# Patient Record
Sex: MALE | Race: BLACK OR AFRICAN AMERICAN | Employment: OTHER | URBAN - METROPOLITAN AREA
[De-identification: names, ages, dates, MRNs, and addresses within clinical notes are randomized per-mention and may not be internally consistent; named-entity substitution may affect disease eponyms.]

---

## 2019-12-16 ENCOUNTER — OFFICE VISIT (OUTPATIENT)
Dept: FAMILY MEDICINE CLINIC | Age: 64
End: 2019-12-16

## 2019-12-16 ENCOUNTER — HOSPITAL ENCOUNTER (OUTPATIENT)
Dept: LAB | Age: 64
Discharge: HOME | End: 2019-12-16

## 2019-12-16 VITALS
TEMPERATURE: 98.7 F | RESPIRATION RATE: 18 BRPM | OXYGEN SATURATION: 99 % | DIASTOLIC BLOOD PRESSURE: 94 MMHG | HEART RATE: 80 BPM | BODY MASS INDEX: 39.45 KG/M2 | HEIGHT: 72 IN | SYSTOLIC BLOOD PRESSURE: 145 MMHG | WEIGHT: 291.3 LBS

## 2019-12-16 DIAGNOSIS — E66.01 SEVERE OBESITY (HCC): ICD-10-CM

## 2019-12-16 DIAGNOSIS — I73.9 PERIPHERAL VASCULAR DISEASE (HCC): ICD-10-CM

## 2019-12-16 DIAGNOSIS — J43.9 PULMONARY EMPHYSEMA, UNSPECIFIED EMPHYSEMA TYPE (HCC): ICD-10-CM

## 2019-12-16 DIAGNOSIS — G47.33 OSA (OBSTRUCTIVE SLEEP APNEA): Chronic | ICD-10-CM

## 2019-12-16 DIAGNOSIS — M50.30 DDD (DEGENERATIVE DISC DISEASE), CERVICAL: ICD-10-CM

## 2019-12-16 DIAGNOSIS — I10 ESSENTIAL HYPERTENSION: Primary | ICD-10-CM

## 2019-12-16 DIAGNOSIS — I10 ESSENTIAL HYPERTENSION: ICD-10-CM

## 2019-12-16 LAB
ALBUMIN SERPL-MCNC: 4.4 G/DL (ref 3.5–5)
ALBUMIN/GLOB SERPL: 1.3 {RATIO} (ref 1.1–2.2)
ALP SERPL-CCNC: 92 U/L (ref 45–117)
ALT SERPL-CCNC: 55 U/L (ref 12–78)
ANION GAP SERPL CALC-SCNC: 6 MMOL/L (ref 5–15)
AST SERPL-CCNC: 32 U/L (ref 15–37)
BASOPHILS # BLD: 0.1 K/UL (ref 0–0.1)
BASOPHILS NFR BLD: 1 % (ref 0–1)
BILIRUB SERPL-MCNC: 0.4 MG/DL (ref 0.2–1)
BUN SERPL-MCNC: 16 MG/DL (ref 6–20)
BUN/CREAT SERPL: 14 (ref 12–20)
CALCIUM SERPL-MCNC: 9 MG/DL (ref 8.5–10.1)
CHLORIDE SERPL-SCNC: 106 MMOL/L (ref 97–108)
CHOLEST SERPL-MCNC: 207 MG/DL
CO2 SERPL-SCNC: 25 MMOL/L (ref 21–32)
CREAT SERPL-MCNC: 1.14 MG/DL (ref 0.7–1.3)
DIFFERENTIAL METHOD BLD: ABNORMAL
EOSINOPHIL # BLD: 0.1 K/UL (ref 0–0.4)
EOSINOPHIL NFR BLD: 1 % (ref 0–7)
ERYTHROCYTE [DISTWIDTH] IN BLOOD BY AUTOMATED COUNT: 15.6 % (ref 11.5–14.5)
EST. AVERAGE GLUCOSE BLD GHB EST-MCNC: 126 MG/DL
GLOBULIN SER CALC-MCNC: 3.5 G/DL (ref 2–4)
GLUCOSE SERPL-MCNC: 105 MG/DL (ref 65–100)
HBA1C MFR BLD: 6 % (ref 4–5.6)
HCT VFR BLD AUTO: 49.8 % (ref 36.6–50.3)
HDLC SERPL-MCNC: 41 MG/DL
HDLC SERPL: 5 {RATIO} (ref 0–5)
HGB BLD-MCNC: 16.2 G/DL (ref 12.1–17)
IMM GRANULOCYTES # BLD AUTO: 0 K/UL (ref 0–0.04)
IMM GRANULOCYTES NFR BLD AUTO: 0 % (ref 0–0.5)
LDLC SERPL CALC-MCNC: 135.2 MG/DL (ref 0–100)
LIPID PROFILE,FLP: ABNORMAL
LYMPHOCYTES # BLD: 2 K/UL (ref 0.8–3.5)
LYMPHOCYTES NFR BLD: 30 % (ref 12–49)
MCH RBC QN AUTO: 30.4 PG (ref 26–34)
MCHC RBC AUTO-ENTMCNC: 32.5 G/DL (ref 30–36.5)
MCV RBC AUTO: 93.4 FL (ref 80–99)
MONOCYTES # BLD: 0.8 K/UL (ref 0–1)
MONOCYTES NFR BLD: 11 % (ref 5–13)
NEUTS SEG # BLD: 3.9 K/UL (ref 1.8–8)
NEUTS SEG NFR BLD: 57 % (ref 32–75)
NRBC # BLD: 0 K/UL (ref 0–0.01)
NRBC BLD-RTO: 0 PER 100 WBC
PLATELET # BLD AUTO: 231 K/UL (ref 150–400)
PMV BLD AUTO: 9.9 FL (ref 8.9–12.9)
POTASSIUM SERPL-SCNC: 4.2 MMOL/L (ref 3.5–5.1)
PROT SERPL-MCNC: 7.9 G/DL (ref 6.4–8.2)
RBC # BLD AUTO: 5.33 M/UL (ref 4.1–5.7)
SODIUM SERPL-SCNC: 137 MMOL/L (ref 136–145)
TRIGL SERPL-MCNC: 154 MG/DL (ref ?–150)
TSH SERPL DL<=0.05 MIU/L-ACNC: 1.63 UIU/ML (ref 0.36–3.74)
VLDLC SERPL CALC-MCNC: 30.8 MG/DL
WBC # BLD AUTO: 6.8 K/UL (ref 4.1–11.1)

## 2019-12-16 RX ORDER — NAPROXEN 500 MG/1
500 TABLET ORAL 2 TIMES DAILY WITH MEALS
COMMUNITY
Start: 2019-11-08 | End: 2019-12-16 | Stop reason: SDUPTHER

## 2019-12-16 RX ORDER — ASPIRIN 81 MG/1
TABLET ORAL DAILY
COMMUNITY
End: 2020-03-09

## 2019-12-16 RX ORDER — TIOTROPIUM BROMIDE 18 UG/1
1 CAPSULE ORAL; RESPIRATORY (INHALATION) DAILY
COMMUNITY
Start: 2019-12-05 | End: 2020-03-09

## 2019-12-16 RX ORDER — LOSARTAN POTASSIUM 100 MG/1
100 TABLET ORAL DAILY
COMMUNITY
Start: 2019-10-17 | End: 2020-04-13

## 2019-12-16 RX ORDER — NAPROXEN 500 MG/1
500 TABLET ORAL 2 TIMES DAILY WITH MEALS
Qty: 180 TAB | Refills: 1 | Status: SHIPPED | OUTPATIENT
Start: 2019-12-16 | End: 2021-11-04

## 2019-12-16 RX ORDER — GABAPENTIN 800 MG/1
800 TABLET ORAL 3 TIMES DAILY
COMMUNITY
Start: 2019-12-05 | End: 2020-01-13

## 2019-12-16 RX ORDER — FLUTICASONE FUROATE AND VILANTEROL TRIFENATATE 100; 25 UG/1; UG/1
1 POWDER RESPIRATORY (INHALATION) DAILY
COMMUNITY
Start: 2019-12-05 | End: 2020-11-02 | Stop reason: SDUPTHER

## 2019-12-16 RX ORDER — AMLODIPINE BESYLATE 10 MG/1
10 TABLET ORAL DAILY
COMMUNITY
Start: 2019-10-02 | End: 2020-04-29

## 2019-12-16 NOTE — PROGRESS NOTES
Chief Complaint   Patient presents with   Jaime Noon Establish Care     Nerve pinched in neck: Numbness: Pins and needles Left arm    Sore Throat     on right side X 2-3 weeks    Breathing Problem     COPD: Request Pulmonary referral     Sleep Apnea    Labs     Fasting today    Foot Pain     Left foot     1. Have you been to the ER, urgent care clinic since your last visit? Hospitalized since your last visit? No    2. Have you seen or consulted any other health care providers outside of the [address] [place] [address] since your last visit? Include any pap smears or colon screening. No    Has DDD cerv and is on disability    Has copd and is still smoking    Needs labs on rx    Has HTN and bp is ok at home    Chief Complaint   Patient presents with   Jaime Noon Establish Care     Nerve pinched in neck: Numbness: Pins and needles Left arm    Sore Throat     on right side X 2-3 weeks    Breathing Problem     COPD: Request Pulmonary referral     Sleep Apnea    Labs     Fasting today    Foot Pain     Left foot     He is a 59 y.o. male who presents for evalution. Reviewed PmHx, RxHx, FmHx, SocHx, AllgHx and updated and dated in the chart.     Patient Active Problem List    Diagnosis    Severe obesity ([address]    Peripheral vascular disease ([address]    [facility]ma [facility]    DDD (degenerative disc disease), cervical    Essential hypertension    HELGA (obstructive sleep apnea)       Review of Systems - negative except as listed above in the HPI    Objective:     Vitals:    12/16/19 0930   BP: (!) 145/94   Pulse: 80   Resp: 18   Temp: 98.7 °F (37.1 °C)   TempSrc: Oral   SpO2: 99%   Weight: 291 lb 4.8 oz (132.1 kg)   Height: 6' (1.829 m)     Physical Examination: General appearance - alert, well appearing, and in no distress  Eyes - pupils equal and reactive, extraocular eye movements intact  Ears - bilateral TM's and external ear canals normal  Nose - normal and patent, no erythema, discharge or polyps  Mouth - mucous membranes moist, pharynx normal without lesions  Neck - supple, no significant adenopathy  Chest - clear to auscultation, no wheezes, rales or rhonchi, symmetric air entry  Heart - normal rate, regular rhythm, normal S1, S2, no murmurs, rubs, clicks or gallops  Abdomen - soft, nontender, nondistended, no masses or organomegaly  Extremities - peripheral pulses normal, no pedal edema, no clubbing or cyanosis      Assessment/ Plan:   Diagnoses and all orders for this visit:    1. Essential hypertension  -     LIPID PANEL; Future  -     METABOLIC PANEL, COMPREHENSIVE; Future  -     CBC WITH AUTOMATED DIFF; Future  -     TSH 3RD GENERATION; Future  -     HEMOGLOBIN A1C WITH EAG; Future  -inc in pain    2. Severe obesity ([address]  -dwp wt loss and diet changes    3. DDD (degenerative disc disease), cervical  -     naproxen (NAPROSYN) 500 mg tablet; Take 1 Tab by mouth two (2) times daily (with meals). -add rx    4. Peripheral vascular disease ([address]  -     LIPID PANEL; Future  -     METABOLIC PANEL, COMPREHENSIVE; Future    5. Pulmonary emphysema, unspecified emphysema type ([address]  -dwp need to dc smoking  -refer to pulm    6. HELGA (obstructive sleep apnea)  -con cpap           I have discussed the diagnosis with the patient and the intended plan as seen in the above orders. The patient understands and agrees with the plan. The patient has received an after-visit summary and questions were answered concerning future plans. Medication Side Effects and Warnings were discussed with patient  Patient Labs were reviewed and or requested:  Patient Past Records were reviewed and or requested    Guille Baca M.D. There are no Patient Instructions on file for this visit.

## 2019-12-17 NOTE — PROGRESS NOTES
Called and spoke to patient. Delight Goldmann) Patient states understanding of lab results per Dr Rhiannon Pham show increased sugar, pre-diabetes and increased cholesterol. Patient states he will make suggested diet changes and recheck in 6 months. Appointment scheduled 5/26/20.

## 2019-12-17 NOTE — PROGRESS NOTES
Kleber pt--labs show inc sugar and pt is pre-diabetes and inc cholesterol-dwp diet changes and check in 6 months    Maricruz

## 2020-02-20 ENCOUNTER — DOCUMENTATION ONLY (OUTPATIENT)
Dept: FAMILY MEDICINE CLINIC | Age: 65
End: 2020-02-20

## 2020-02-20 NOTE — PROGRESS NOTES
Guardian physical capabilities eval form was put on [facility] desk to process

## 2020-02-24 ENCOUNTER — TELEPHONE (OUTPATIENT)
Dept: FAMILY MEDICINE CLINIC | Age: 65
End: 2020-02-24

## 2020-03-04 ENCOUNTER — OFFICE VISIT (OUTPATIENT)
Dept: FAMILY MEDICINE CLINIC | Age: 65
End: 2020-03-04

## 2020-03-04 VITALS
BODY MASS INDEX: 39.54 KG/M2 | WEIGHT: 291.9 LBS | HEIGHT: 72 IN | OXYGEN SATURATION: 98 % | TEMPERATURE: 98 F | RESPIRATION RATE: 18 BRPM | HEART RATE: 73 BPM | DIASTOLIC BLOOD PRESSURE: 86 MMHG | SYSTOLIC BLOOD PRESSURE: 136 MMHG

## 2020-03-04 DIAGNOSIS — M50.30 DDD (DEGENERATIVE DISC DISEASE), CERVICAL: Primary | ICD-10-CM

## 2020-03-04 DIAGNOSIS — I10 ESSENTIAL HYPERTENSION: ICD-10-CM

## 2020-03-04 NOTE — PROGRESS NOTES
Chief Complaint   Patient presents with    Form Completion    Hypertension    Arm Pain     Left arm     1. Have you been to the ER, urgent care clinic since your last visit? Hospitalized since your last visit? No    2. Have you seen or consulted any other health care providers outside of the [address] [place] [address] since your last visit? Include any pap smears or colon screening. No     Has left arm pain and numbness and getting worse, has disability from prior and wants papers filled out      Chief Complaint   Patient presents with    Form Completion    Hypertension    Arm Pain     Left arm     He is a 59 y.o. male who presents for evalution. Reviewed PmHx, RxHx, FmHx, SocHx, AllgHx and updated and dated in the chart. Patient Active Problem List    Diagnosis    [facility]    Peripheral vascular disease ([facility]    Pulmonary emphysema ([facility]    DDD (degenerative disc disease), cervical    Essential hypertension    HELGA (obstructive sleep apnea)       Review of Systems - negative except as listed above in the HPI    Objective:     Vitals:    03/04/20 0821 03/04/20 0837   BP: 155/90 136/86   Pulse: 73    Resp: 18    Temp: 98 °F (36.7 °C)    TempSrc: Oral    SpO2: 98%    Weight: 291 lb 14.4 oz (132.4 kg)    Height: 6' (1.829 m)      Physical Examination: General appearance - alert, well appearing, and in no distress  Chest - clear to auscultation, no wheezes, rales or rhonchi, symmetric air entry  Heart - normal rate, regular rhythm, normal S1, S2, no murmurs, rubs, clicks or gallops  Abdomen - soft, nontender, nondistended, no masses or organomegaly  Back exam - full range of motion, no tenderness, palpable spasm or pain on motion      Assessment/ Plan:   Diagnoses and all orders for this visit:    1. DDD (degenerative disc disease), cervical  -     REFERRAL TO ORTHOPEDICS  -not getting better  -papers filled and faxed and filed    2.  Essential hypertension  -at goal           I have discussed the diagnosis with the patient and the intended plan as seen in the above orders. The patient understands and agrees with the plan. The patient has received an after-visit summary and questions were answered concerning future plans. Medication Side Effects and Warnings were discussed with patient  Patient Labs were reviewed and or requested:  Patient Past Records were reviewed and or requested    Will LISBET Thrasher. There are no Patient Instructions on file for this visit.

## 2020-03-05 ENCOUNTER — DOCUMENTATION ONLY (OUTPATIENT)
Dept: FAMILY MEDICINE CLINIC | Age: 65
End: 2020-03-05

## 2020-03-09 RX ORDER — ASPIRIN 81 MG/1
TABLET ORAL
Qty: 90 TAB | Refills: 5 | Status: SHIPPED | OUTPATIENT
Start: 2020-03-09

## 2020-03-09 RX ORDER — TIOTROPIUM BROMIDE 18 UG/1
CAPSULE ORAL; RESPIRATORY (INHALATION)
Qty: 30 CAP | Refills: 5 | Status: SHIPPED | OUTPATIENT
Start: 2020-03-09 | End: 2020-10-27

## 2020-04-13 RX ORDER — LOSARTAN POTASSIUM 100 MG/1
TABLET ORAL
Qty: 90 TAB | Refills: 1 | Status: SHIPPED | OUTPATIENT
Start: 2020-04-13 | End: 2020-10-09

## 2020-04-28 ENCOUNTER — DOCUMENTATION ONLY (OUTPATIENT)
Dept: FAMILY MEDICINE CLINIC | Age: 65
End: 2020-04-28

## 2020-04-28 NOTE — PROGRESS NOTES
[address] request for medical records was faxed to [facility] [phone] to be processed.

## 2020-04-29 RX ORDER — AMLODIPINE BESYLATE 10 MG/1
TABLET ORAL
Qty: 90 TAB | Refills: 1 | Status: SHIPPED | OUTPATIENT
Start: 2020-04-29 | End: 2020-10-26

## 2020-07-20 DIAGNOSIS — M50.30 DDD (DEGENERATIVE DISC DISEASE), CERVICAL: ICD-10-CM

## 2020-07-20 RX ORDER — GABAPENTIN 800 MG/1
TABLET ORAL
Qty: 90 TAB | Refills: 1 | Status: SHIPPED | OUTPATIENT
Start: 2020-07-20 | End: 2020-09-30

## 2020-09-15 ENCOUNTER — VIRTUAL VISIT (OUTPATIENT)
Dept: FAMILY MEDICINE CLINIC | Age: 65
End: 2020-09-15
Payer: MEDICARE

## 2020-09-15 DIAGNOSIS — H10.029 PINK EYE DISEASE, UNSPECIFIED LATERALITY: ICD-10-CM

## 2020-09-15 DIAGNOSIS — I10 ESSENTIAL HYPERTENSION: Primary | ICD-10-CM

## 2020-09-15 PROCEDURE — 99213 OFFICE O/P EST LOW 20 MIN: CPT | Performed by: FAMILY MEDICINE

## 2020-09-15 RX ORDER — POLYMYXIN B SULFATE AND TRIMETHOPRIM 1; 10000 MG/ML; [USP'U]/ML
1 SOLUTION OPHTHALMIC EVERY 6 HOURS
Qty: 1 BOTTLE | Refills: 0 | Status: SHIPPED | OUTPATIENT
Start: 2020-09-15 | End: 2020-09-22

## 2020-09-15 NOTE — PROGRESS NOTES
Patient here today for follow-up visit. Blood pressures been doing better at home. Patient is due for lab work and needs a face-to-face visit. He complains of his right eye with yellow discharge and red would like an antibiotic called in. Consent: Milind Simpson, who was seen by synchronous (real-time) audio-video technology, and/or his healthcare decision maker, is aware that this patient-initiated, Telehealth encounter on 9/15/2020 is a billable service, with coverage as determined by his insurance carrier. He is aware that he may receive a bill and has provided verbal consent to proceed: YES-Consent obtained within past 12 months        712  Subjective: Milind Simpson is a 72 y.o. male who was seen for No chief complaint on file. Prior to Admission medications    Medication Sig Start Date End Date Taking? Authorizing Provider   trimethoprim-polymyxin b (POLYTRIM) ophthalmic solution Administer 1 Drop to left eye every six (6) hours for 7 days. 9/15/20 9/22/20 Yes Graciela Sparrow MD   gabapentin (NEURONTIN) 800 mg tablet TAKE 1 TABLET BY MOUTH THREE TIMES DAILY 7/20/20   Graciela Sparrow MD   amLODIPine (NORVASC) 10 mg tablet TAKE 1 TABLET BY MOUTH EVERY DAY 4/29/20   Graciela Sparrow MD   losartan (COZAAR) 100 mg tablet TAKE 1 TABLET BY MOUTH EVERY DAY 4/13/20   Graciela Sparrow MD   aspirin delayed-release 81 mg tablet TAKE 1 TABLET BY MOUTH EVERY DAY 3/9/20   Graciela Sparrow MD   [facility] WITH HANDIHALER 18 mcg inhalation capsule INHALE THE CONTENTS OF 1 CAPSULE VIA INHALATION DEVICE EVERY DAY 3/9/20   Graciela Sparrow MD   aspirin/salicylamide/caffeine (BC HEADACHE POWDER PO) Take  by mouth. Provider, Historical   BREO ELLIPTA 100-25 mcg/dose inhaler Take 1 Puff by inhalation daily. 12/5/19   Provider, Historical   naproxen (NAPROSYN) 500 mg tablet Take 1 Tab by mouth two (2) times daily (with meals).  12/16/19   Graciela Sparrow MD     No Known Allergies    ROS    Objective:   Vital Signs: (As obtained by patient/caregiver at home)  There were no vitals taken for this visit. [INSTRUCTIONS:  \"[x]\" Indicates a positive item  \"[]\" Indicates a negative item  -- DELETE ALL ITEMS NOT EXAMINED]    Constitutional: [x] Appears well-developed and well-nourished [x] No apparent distress      [] Abnormal -     Mental status: [x] Alert and awake  [x] Oriented to person/place/time [x] Able to follow commands    [] Abnormal -     Eyes:   EOM    [x]  Normal    [] Abnormal -   Sclera  [x]  Normal    [] Abnormal -          Discharge [x]  None visible   [] Abnormal -     HENT: [x] Normocephalic, atraumatic  [] Abnormal -   [x] Mouth/Throat: Mucous membranes are moist    External Ears [x] Normal  [] Abnormal -    Neck: [x] No visualized mass [] Abnormal -     Pulmonary/Chest: [x] Respiratory effort normal   [x] No visualized signs of difficulty breathing or respiratory distress        [] Abnormal -        Neurological:        [x] No Facial Asymmetry (Cranial nerve 7 motor function) (limited exam due to video visit)          [x] No gaze palsy        [] Abnormal -          Skin:        [x] No significant exanthematous lesions or discoloration noted on facial skin         [] Abnormal -            Psychiatric:       [x] Normal Affect [] Abnormal -        [x] No Hallucinations    Other pertinent observable physical exam findings:-              Assessment & Plan:   Diagnoses and all orders for this visit:    1. Essential hypertension  -At goal  2. Pink eye disease, unspecified laterality  -     trimethoprim-polymyxin b (POLYTRIM) ophthalmic solution; Administer 1 Drop to left eye every six (6) hours for 7 days. We discussed the expected course, resolution and complications of the diagnosis(es) in detail. Medication risks, benefits, costs, interactions, and alternatives were discussed as indicated. I advised him to contact the office if his condition worsens, changes or fails to improve as anticipated.  He expressed understanding with the diagnosis(es) and plan. Narcisa Jeffery is a 72 y.o. male being evaluated by a video visit encounter for concerns as above. A caregiver was present when appropriate. Due to this being a TeleHealth encounter (During URBEF-01 public health emergency), evaluation of the following organ systems was limited: Vitals/Constitutional/EENT/Resp/CV/GI//MS/Neuro/Skin/Heme-Lymph-Imm. Pursuant to the emergency declaration under the [facility], [facility] waiver authority and the [employer] Act, this Virtual  Visit was conducted, with patient's (and/or legal guardian's) consent, to reduce the patient's risk of exposure to COVID-19 and provide necessary medical care. Services were provided through a video synchronous discussion virtually to substitute for in-person clinic visit. Patient and provider were located at their individual homes.         Danial Blount MD          .

## 2020-09-30 DIAGNOSIS — M50.30 DDD (DEGENERATIVE DISC DISEASE), CERVICAL: ICD-10-CM

## 2020-09-30 RX ORDER — GABAPENTIN 800 MG/1
TABLET ORAL
Qty: 90 TAB | Refills: 0 | Status: SHIPPED | OUTPATIENT
Start: 2020-09-30 | End: 2020-11-06 | Stop reason: SDUPTHER

## 2020-10-09 RX ORDER — LOSARTAN POTASSIUM 100 MG/1
TABLET ORAL
Qty: 90 TAB | Refills: 1 | Status: SHIPPED | OUTPATIENT
Start: 2020-10-09 | End: 2021-04-05 | Stop reason: SDUPTHER

## 2020-10-26 RX ORDER — AMLODIPINE BESYLATE 10 MG/1
TABLET ORAL
Qty: 90 TAB | Refills: 1 | Status: SHIPPED | OUTPATIENT
Start: 2020-10-26 | End: 2021-04-19 | Stop reason: SDUPTHER

## 2020-10-27 RX ORDER — TIOTROPIUM BROMIDE 18 UG/1
CAPSULE ORAL; RESPIRATORY (INHALATION)
Qty: 30 CAP | Refills: 5 | Status: SHIPPED | OUTPATIENT
Start: 2020-10-27 | End: 2021-05-17 | Stop reason: SDUPTHER

## 2020-11-02 ENCOUNTER — OFFICE VISIT (OUTPATIENT)
Dept: FAMILY MEDICINE CLINIC | Age: 65
End: 2020-11-02
Payer: MEDICARE

## 2020-11-02 VITALS
TEMPERATURE: 98.6 F | RESPIRATION RATE: 19 BRPM | DIASTOLIC BLOOD PRESSURE: 88 MMHG | HEIGHT: 72 IN | SYSTOLIC BLOOD PRESSURE: 136 MMHG | WEIGHT: 289.7 LBS | OXYGEN SATURATION: 98 % | BODY MASS INDEX: 39.24 KG/M2 | HEART RATE: 88 BPM

## 2020-11-02 DIAGNOSIS — I73.9 PERIPHERAL VASCULAR DISEASE (HCC): ICD-10-CM

## 2020-11-02 DIAGNOSIS — R73.9 ELEVATED BLOOD SUGAR: ICD-10-CM

## 2020-11-02 DIAGNOSIS — Z12.5 PROSTATE CANCER SCREENING: ICD-10-CM

## 2020-11-02 DIAGNOSIS — E66.01 SEVERE OBESITY (HCC): ICD-10-CM

## 2020-11-02 DIAGNOSIS — Z00.00 ROUTINE GENERAL MEDICAL EXAMINATION AT A HEALTH CARE FACILITY: Primary | ICD-10-CM

## 2020-11-02 DIAGNOSIS — G47.33 OSA (OBSTRUCTIVE SLEEP APNEA): ICD-10-CM

## 2020-11-02 DIAGNOSIS — J43.9 PULMONARY EMPHYSEMA, UNSPECIFIED EMPHYSEMA TYPE (HCC): ICD-10-CM

## 2020-11-02 DIAGNOSIS — I10 ESSENTIAL HYPERTENSION: ICD-10-CM

## 2020-11-02 DIAGNOSIS — M50.30 DDD (DEGENERATIVE DISC DISEASE), CERVICAL: ICD-10-CM

## 2020-11-02 LAB
ALBUMIN SERPL-MCNC: 4.1 G/DL (ref 3.5–5)
ALBUMIN/GLOB SERPL: 1.1 {RATIO} (ref 1.1–2.2)
ALP SERPL-CCNC: 104 U/L (ref 45–117)
ALT SERPL-CCNC: 315 U/L (ref 12–78)
ANION GAP SERPL CALC-SCNC: 9 MMOL/L (ref 5–15)
AST SERPL-CCNC: 211 U/L (ref 15–37)
BASOPHILS # BLD: 0 K/UL (ref 0–0.1)
BASOPHILS NFR BLD: 1 % (ref 0–1)
BILIRUB SERPL-MCNC: 0.4 MG/DL (ref 0.2–1)
BUN SERPL-MCNC: 12 MG/DL (ref 6–20)
BUN/CREAT SERPL: 9 (ref 12–20)
CALCIUM SERPL-MCNC: 9.1 MG/DL (ref 8.5–10.1)
CHLORIDE SERPL-SCNC: 107 MMOL/L (ref 97–108)
CHOLEST SERPL-MCNC: 227 MG/DL
CO2 SERPL-SCNC: 23 MMOL/L (ref 21–32)
CREAT SERPL-MCNC: 1.28 MG/DL (ref 0.7–1.3)
DIFFERENTIAL METHOD BLD: ABNORMAL
EOSINOPHIL # BLD: 0.1 K/UL (ref 0–0.4)
EOSINOPHIL NFR BLD: 3 % (ref 0–7)
ERYTHROCYTE [DISTWIDTH] IN BLOOD BY AUTOMATED COUNT: 16.3 % (ref 11.5–14.5)
EST. AVERAGE GLUCOSE BLD GHB EST-MCNC: 117 MG/DL
GLOBULIN SER CALC-MCNC: 3.7 G/DL (ref 2–4)
GLUCOSE SERPL-MCNC: 92 MG/DL (ref 65–100)
HBA1C MFR BLD: 5.7 % (ref 4–5.6)
HCT VFR BLD AUTO: 54.1 % (ref 36.6–50.3)
HDLC SERPL-MCNC: 38 MG/DL
HDLC SERPL: 6 {RATIO} (ref 0–5)
HGB BLD-MCNC: 17.4 G/DL (ref 12.1–17)
IMM GRANULOCYTES # BLD AUTO: 0 K/UL (ref 0–0.04)
IMM GRANULOCYTES NFR BLD AUTO: 0 % (ref 0–0.5)
LDLC SERPL CALC-MCNC: 130.8 MG/DL (ref 0–100)
LIPID PROFILE,FLP: ABNORMAL
LYMPHOCYTES # BLD: 2 K/UL (ref 0.8–3.5)
LYMPHOCYTES NFR BLD: 44 % (ref 12–49)
MCH RBC QN AUTO: 30.1 PG (ref 26–34)
MCHC RBC AUTO-ENTMCNC: 32.2 G/DL (ref 30–36.5)
MCV RBC AUTO: 93.6 FL (ref 80–99)
MONOCYTES # BLD: 0.5 K/UL (ref 0–1)
MONOCYTES NFR BLD: 13 % (ref 5–13)
NEUTS SEG # BLD: 1.6 K/UL (ref 1.8–8)
NEUTS SEG NFR BLD: 39 % (ref 32–75)
NRBC # BLD: 0 K/UL (ref 0–0.01)
NRBC BLD-RTO: 0 PER 100 WBC
PLATELET # BLD AUTO: 199 K/UL (ref 150–400)
PMV BLD AUTO: 10.6 FL (ref 8.9–12.9)
POTASSIUM SERPL-SCNC: 4.3 MMOL/L (ref 3.5–5.1)
PROT SERPL-MCNC: 7.8 G/DL (ref 6.4–8.2)
PSA SERPL-MCNC: 0.4 NG/ML (ref 0.01–4)
RBC # BLD AUTO: 5.78 M/UL (ref 4.1–5.7)
RBC MORPH BLD: ABNORMAL
RBC MORPH BLD: ABNORMAL
SODIUM SERPL-SCNC: 139 MMOL/L (ref 136–145)
TRIGL SERPL-MCNC: 291 MG/DL (ref ?–150)
TSH SERPL DL<=0.05 MIU/L-ACNC: 1.27 UIU/ML (ref 0.36–3.74)
VLDLC SERPL CALC-MCNC: 58.2 MG/DL
WBC # BLD AUTO: 4.2 K/UL (ref 4.1–11.1)

## 2020-11-02 PROCEDURE — 3017F COLORECTAL CA SCREEN DOC REV: CPT | Performed by: FAMILY MEDICINE

## 2020-11-02 PROCEDURE — G8752 SYS BP LESS 140: HCPCS | Performed by: FAMILY MEDICINE

## 2020-11-02 PROCEDURE — G8536 NO DOC ELDER MAL SCRN: HCPCS | Performed by: FAMILY MEDICINE

## 2020-11-02 PROCEDURE — G0402 INITIAL PREVENTIVE EXAM: HCPCS | Performed by: FAMILY MEDICINE

## 2020-11-02 PROCEDURE — 99214 OFFICE O/P EST MOD 30 MIN: CPT | Performed by: FAMILY MEDICINE

## 2020-11-02 PROCEDURE — G8417 CALC BMI ABV UP PARAM F/U: HCPCS | Performed by: FAMILY MEDICINE

## 2020-11-02 PROCEDURE — G8754 DIAS BP LESS 90: HCPCS | Performed by: FAMILY MEDICINE

## 2020-11-02 PROCEDURE — G8510 SCR DEP NEG, NO PLAN REQD: HCPCS | Performed by: FAMILY MEDICINE

## 2020-11-02 PROCEDURE — 1101F PT FALLS ASSESS-DOCD LE1/YR: CPT | Performed by: FAMILY MEDICINE

## 2020-11-02 PROCEDURE — G8427 DOCREV CUR MEDS BY ELIG CLIN: HCPCS | Performed by: FAMILY MEDICINE

## 2020-11-02 RX ORDER — FLUTICASONE FUROATE AND VILANTEROL TRIFENATATE 100; 25 UG/1; UG/1
1 POWDER RESPIRATORY (INHALATION) DAILY
Qty: 1 INHALER | Refills: 5 | Status: SHIPPED | OUTPATIENT
Start: 2020-11-02 | End: 2021-04-26 | Stop reason: SDUPTHER

## 2020-11-02 RX ORDER — CYCLOBENZAPRINE HCL 10 MG
10 TABLET ORAL
COMMUNITY
Start: 2020-03-06
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
## 2020-11-06 DIAGNOSIS — M50.30 DDD (DEGENERATIVE DISC DISEASE), CERVICAL: ICD-10-CM

## 2020-11-06 RX ORDER — GABAPENTIN 800 MG/1
TABLET ORAL
Qty: 90 TAB | Refills: 0 | Status: SHIPPED | OUTPATIENT
Start: 2020-11-06 | End: 2020-12-10 | Stop reason: SDUPTHER

## 2020-11-23 ENCOUNTER — DOCUMENTATION ONLY (OUTPATIENT)
Dept: FAMILY MEDICINE CLINIC | Age: 65
End: 2020-11-23

## 2020-11-23 NOTE — PROGRESS NOTES
ISG / Record  request for medical records was faxed to [phone] to be processed on 11/17/20

## 2020-11-24 ENCOUNTER — OFFICE VISIT (OUTPATIENT)
Dept: FAMILY MEDICINE CLINIC | Age: 65
End: 2020-11-24
Payer: MEDICARE

## 2020-11-24 VITALS
RESPIRATION RATE: 16 BRPM | OXYGEN SATURATION: 99 % | DIASTOLIC BLOOD PRESSURE: 84 MMHG | HEART RATE: 72 BPM | HEIGHT: 72 IN | WEIGHT: 288 LBS | TEMPERATURE: 97.9 F | BODY MASS INDEX: 39.01 KG/M2 | SYSTOLIC BLOOD PRESSURE: 153 MMHG

## 2020-11-24 DIAGNOSIS — R79.89 LFT ELEVATION: ICD-10-CM

## 2020-11-24 DIAGNOSIS — I10 ESSENTIAL HYPERTENSION: ICD-10-CM

## 2020-11-24 DIAGNOSIS — R79.89 LFT ELEVATION: Primary | ICD-10-CM

## 2020-11-24 PROCEDURE — 1101F PT FALLS ASSESS-DOCD LE1/YR: CPT | Performed by: FAMILY MEDICINE

## 2020-11-24 PROCEDURE — G8417 CALC BMI ABV UP PARAM F/U: HCPCS | Performed by: FAMILY MEDICINE

## 2020-11-24 PROCEDURE — G8427 DOCREV CUR MEDS BY ELIG CLIN: HCPCS | Performed by: FAMILY MEDICINE

## 2020-11-24 PROCEDURE — G8754 DIAS BP LESS 90: HCPCS | Performed by: FAMILY MEDICINE

## 2020-11-24 PROCEDURE — G8753 SYS BP > OR = 140: HCPCS | Performed by: FAMILY MEDICINE

## 2020-11-24 PROCEDURE — 3017F COLORECTAL CA SCREEN DOC REV: CPT | Performed by: FAMILY MEDICINE

## 2020-11-24 PROCEDURE — G8432 DEP SCR NOT DOC, RNG: HCPCS | Performed by: FAMILY MEDICINE

## 2020-11-24 PROCEDURE — G8536 NO DOC ELDER MAL SCRN: HCPCS | Performed by: FAMILY MEDICINE

## 2020-11-24 PROCEDURE — 99214 OFFICE O/P EST MOD 30 MIN: CPT | Performed by: FAMILY MEDICINE

## 2020-11-24 NOTE — PROGRESS NOTES
Chief Complaint   Patient presents with    Abnormal Lab Results     11/2/20    Hypertension     1. Have you been to the ER, urgent care clinic since your last visit? Hospitalized since your last visit? No    2. Have you seen or consulted any other health care providers outside of the [address] [place] [address] since your last visit? Include any pap smears or colon screening. No      Inc lfts, drinks occ but not daily, no other sx           Chief Complaint   Patient presents with    Abnormal Lab Results     11/2/20    Hypertension     He is a 72 y.o. male who presents for evalution. Reviewed PmHx, RxHx, FmHx, SocHx, AllgHx and updated and dated in the chart. Patient Active Problem List    Diagnosis    Elevated blood sugar    [facility]    Peripheral vascular disease ([facility]    Pulmonary emphysema ([facility]    DDD (degenerative disc disease), cervical    Essential hypertension    HELGA (obstructive sleep apnea)       Review of Systems - negative except as listed above in the HPI    Objective:     Vitals:    11/24/20 1111   BP: (!) 153/84   Pulse: 72   Resp: 16   Temp: 97.9 °F (36.6 °C)   TempSrc: Oral   SpO2: 99%   Weight: 288 lb (130.6 kg)   Height: 6' (1.829 m)     Physical Examination: General appearance - alert, well appearing, and in no distress  Neck - supple, no significant adenopathy  Chest - clear to auscultation, no wheezes, rales or rhonchi, symmetric air entry  Heart - normal rate, regular rhythm, normal S1, S2, no murmurs, rubs, clicks or gallops  Abdomen - soft, nontender, nondistended, no masses or organomegaly      Assessment/ Plan:   Diagnoses and all orders for this visit:    1. LFT elevation  -     HEPATITIS PANEL, ACUTE; Future  -     HEPATIC FUNCTION PANEL; Future  -     US ABD COMP; Future  -min ETOH  -asked pt to dc goodies powders    2.  Essential hypertension  -better at home  -inc today  -pt worrried about #1             I have discussed the diagnosis with the patient and the intended plan as seen in the above orders. The patient understands and agrees with the plan. The patient has received an after-visit summary and questions were answered concerning future plans. Medication Side Effects and Warnings were discussed with patient  Patient Labs were reviewed and or requested:  Patient Past Records were reviewed and or requested    Gagan Christensen M.D. There are no Patient Instructions on file for this visit.

## 2020-11-25 LAB
ALBUMIN SERPL-MCNC: 3.9 G/DL (ref 3.5–5)
ALBUMIN/GLOB SERPL: 1.1 {RATIO} (ref 1.1–2.2)
ALP SERPL-CCNC: 92 U/L (ref 45–117)
ALT SERPL-CCNC: 265 U/L (ref 12–78)
AST SERPL-CCNC: 112 U/L (ref 15–37)
BILIRUB DIRECT SERPL-MCNC: 0.2 MG/DL (ref 0–0.2)
BILIRUB SERPL-MCNC: 0.5 MG/DL (ref 0.2–1)
GLOBULIN SER CALC-MCNC: 3.6 G/DL (ref 2–4)
HAV IGM SER QL: NONREACTIVE
HBV CORE IGM SER QL: NONREACTIVE
HBV SURFACE AG SER QL: <0.1 INDEX
HBV SURFACE AG SER QL: NEGATIVE
HCV AB SERPL QL IA: NONREACTIVE
HCV COMMENT,HCGAC: NORMAL
PROT SERPL-MCNC: 7.5 G/DL (ref 6.4–8.2)
SP1: NORMAL
SP2: NORMAL
SP3: NORMAL

## 2020-11-25 NOTE — PROGRESS NOTES
Called and spoke with patient in reference to labs. Gave him the number to Dr. Susy Pizarro. Patient verbalized understanding.

## 2020-11-25 NOTE — PROGRESS NOTES
Liver functions are still elevated. Hepatitis panel was negative. Would like to for patient to see Dr. Dafne Livingston.     Susie Romo

## 2020-12-01 ENCOUNTER — HOSPITAL ENCOUNTER (OUTPATIENT)
Dept: ULTRASOUND IMAGING | Age: 65
Discharge: HOME | End: 2020-12-01
Payer: MEDICARE

## 2020-12-01 PROCEDURE — 76700 US EXAM ABDOM COMPLETE: CPT

## 2020-12-03 NOTE — PROGRESS NOTES
Called and spoke to patient. Annelise Masters) Patient states understanding of lab results per Dr Monet: Normal test.  Patient states he is aware of [address] appointment at [facility] 12/15/20 for f/u of abnormal lab results from 11/25/20.

## 2020-12-10 DIAGNOSIS — M50.30 DDD (DEGENERATIVE DISC DISEASE), CERVICAL: ICD-10-CM

## 2020-12-10 RX ORDER — GABAPENTIN 800 MG/1
TABLET ORAL
Qty: 90 TAB | Refills: 0 | Status: SHIPPED | OUTPATIENT
Start: 2020-12-10 | End: 2021-01-13 | Stop reason: SDUPTHER

## 2020-12-15 ENCOUNTER — OFFICE VISIT (OUTPATIENT)
Dept: HEMATOLOGY | Age: 65
End: 2020-12-15
Payer: MEDICARE

## 2020-12-15 VITALS
HEART RATE: 76 BPM | OXYGEN SATURATION: 97 % | DIASTOLIC BLOOD PRESSURE: 93 MMHG | BODY MASS INDEX: 39.28 KG/M2 | WEIGHT: 290 LBS | TEMPERATURE: 97.8 F | RESPIRATION RATE: 18 BRPM | HEIGHT: 72 IN | SYSTOLIC BLOOD PRESSURE: 135 MMHG

## 2020-12-15 DIAGNOSIS — R74.8 ABNORMAL LIVER ENZYMES: Primary | ICD-10-CM

## 2020-12-15 PROCEDURE — G8755 DIAS BP > OR = 90: HCPCS | Performed by: HOSPITALIST

## 2020-12-15 PROCEDURE — 3017F COLORECTAL CA SCREEN DOC REV: CPT | Performed by: HOSPITALIST

## 2020-12-15 PROCEDURE — G8417 CALC BMI ABV UP PARAM F/U: HCPCS | Performed by: HOSPITALIST

## 2020-12-15 PROCEDURE — G8752 SYS BP LESS 140: HCPCS | Performed by: HOSPITALIST

## 2020-12-15 PROCEDURE — G8510 SCR DEP NEG, NO PLAN REQD: HCPCS | Performed by: HOSPITALIST

## 2020-12-15 PROCEDURE — 1101F PT FALLS ASSESS-DOCD LE1/YR: CPT | Performed by: HOSPITALIST

## 2020-12-15 PROCEDURE — 99205 OFFICE O/P NEW HI 60 MIN: CPT | Performed by: HOSPITALIST

## 2020-12-15 PROCEDURE — G8427 DOCREV CUR MEDS BY ELIG CLIN: HCPCS | Performed by: HOSPITALIST

## 2020-12-15 PROCEDURE — G8536 NO DOC ELDER MAL SCRN: HCPCS | Performed by: HOSPITALIST

## 2020-12-15 RX ORDER — HYDROCODONE BITARTRATE AND ACETAMINOPHEN 5; 325 MG/1; MG/1
1 TABLET ORAL
Status: ON HOLD | COMMUNITY
Start: 2020-04-10

## 2020-12-15 RX ORDER — DICLOFENAC SODIUM 75 MG/1
TABLET, DELAYED RELEASE ORAL
COMMUNITY
Start: 2020-11-06 | End: 2022-01-27

## 2020-12-15 NOTE — PROGRESS NOTES
Identified pt with two pt identifiers(name and ). Reviewed record in preparation for visit and have obtained necessary documentation. No chief complaint on file. Vitals:    12/15/20 1349   BP: (!) 135/93   Pulse: 76   Resp: 18   Temp: 97.8 °F (36.6 °C)   TempSrc: Temporal   SpO2: 97%   Weight: 290 lb (131.5 kg)   Height: 6' (1.829 m)   PainSc:   0 - No pain         Health Maintenance Review: Patient reminded of \"due or due soon\" health maintenance. I have asked the patient to contact his/her primary care provider (PCP) for follow-up on his/her health maintenance. Coordination of Care Questionnaire:  :   1) Have you been to an emergency room, urgent care, or hospitalized since your last visit? If yes, where when, and reason for visit? no       2. Have seen or consulted any other health care provider since your last visit? If yes, where when, and reason for visit? NO      Patient is accompanied by self I have received verbal consent from Olamide Fu to discuss any/all medical information while they are present in the room.

## 2020-12-15 NOTE — PROGRESS NOTES
[address] [facility]      Tomasz Holliday MD, Myles Birmingham, Imtiaz Dickson MD, MPH      BASILIO Wilson, [facility]     Shani Ross, [facility]   Ana Montiel [facility]-ALBERTA Carmichaelon Dusty, [facility]       Kj Bourgeois [address]    at [place] [address] [address], [address] [address]asc[address]  [address].    [phone]    FAX: [address] [place] [address] Drive, [address], [address]    [phone]    FAX: [phone]     Patient Care Team:  Ольга Vo MD as PCP - General (Family Medicine)  Ольга Vo MD as PCP - [facility]    Problem List  Date Reviewed: 12/15/2020          Codes Class Noted    Severe obesity ([facility] ICD-10-CM: E66.01  ICD-9-CM: 278.01  12/16/2019        Peripheral vascular disease ([facility] ICD-10-CM: I73.9  ICD-9-CM: 443.9  12/16/2019        Pulmonary emphysema ([facility] ICD-10-CM: J43.9  ICD-9-CM: 492.8  12/16/2019        DDD (degenerative disc disease), cervical ICD-10-CM: M50.30  ICD-9-CM: 722.4  12/16/2019        Essential hypertension ICD-10-CM: I10  ICD-9-CM: 401.9  12/16/2019        HELGA (obstructive sleep apnea) (Chronic) ICD-10-CM: R99.71  ICD-9-CM: 327.23  12/16/2019            The clinicians listed above have asked me to see Desireedelio Archer in consultation regarding elevated liver enzymes and its management. All medical records sent by the referring physicians were reviewed including imaging studies     The patient is a 72 y.o.  Black male who was found to have elevated liver enzymes in 10/2020    Blood work performed in 11/2020 showed , , , Total bilirubin 0.4, serum platelet 759     Serologic evaluation for markers of chronic liver disease was negative for hepatitis B and C virus    Imaging of the liver performed with ultrasound in 12/2020 demonstrated homogenous echogenicity    An assessment of liver fibrosis with biopsy or elastography has not been performed. The patient had not started any new medications within 3 months preceding the elevation in liver chemistries. The patient has no symptoms which can be attributed to the liver disorder. The patient is not currently experiencing the following symptoms of liver disease: fatigue, pain in the right side over the liver, yellowing of the eyes or skin,   problems concentrating, swelling of the abdomen, swelling of the lower extremities, hematemesis, hematochezia. Patient has a history of heavy alcohol abuse and now currently binge drink on alcohol often    The patient completes all daily activities without any functional limitations. ASSESSMENT AND PLAN:  Elevated liver enzymes  Persistent elevation in liver transaminases of unclear etiology at this time. AST is elevated, ALT is elevated, ALP is normal. Liver function is normal. The platelet count is normal.      Based upon laboratory studies and imaging the patient does not appear to have advance liver disease or cirrhosis. Serologic testing for causes of chronic liver disease was negative for HBV and HCV    Will perform additional serologic tests to screen for other causes of chronic liver disease. The most likely causes for the liver chemistry abnormalities were discussed with the patient and include alcoholic liver diease and fatty liver    The need to perform an assessment of liver fibrosis was discussed with the patient. The Fibroscan can assess liver fibrosis and determine if a patient has advanced fibrosis or cirrhosis without the need for liver biopsy.   This will be performed in 6 months from date of abstinence from alcohol    Fatty liver  Suspect patient has fatty liver based on features of metabolic syndrome such as obesity, hypertension, HELGA and serologies that are negative for other causes of chronic liver disease  If the patient looses 20% of current body weight, which is 58 pounds, down to a weight of 232 pounds, all steatosis will have resolved. Once all steatosis has resolved all inflammation will resolve. Then all fibrosis will gradually resolve and the liver could eventually be normal.    Screening for Hepatocellular Carcinoma  HCC screening is not necessary if the patient has no evidence of cirrhosis. Treatment of other medical problems in patients with chronic liver disease  There are no contraindications for the patient to take most medications that are necessary for treatment of other medical issues. The patient consumes alcohol on a daily basis or has recently stopped consuming alcohol. Regular alcohol use increases the risk of toxicity from acetaminophen. This analgesic should be avoided until the patient has been abstinent from alcohol for 6 months. Counseling for alcohol in patients with chronic liver disease  The patient was counseled regarding alcohol consumption and the effect of alcohol on chronic liver disease. The patient has a history of heavy alcohol abuse and he binge drink on alcohol. It was recommended that all alcohol consumption be stopped and the patient be abstinent from all alcoholic beverages  If the patient cannot stop consuming alcohol then there is an alcohol abuse disorder and the patient should consider entering alcohol counseling and/or attend AA. Vaccinations   The need for vaccination against viral hepatitis A and B will be assessed with serologic and instituted as appropriate. Routine vaccinations against other bacterial and viral agents can be performed as indicated. Annual flu vaccination should be administered if indicated.     ALLERGIES  No Known Allergies    MEDICATIONS  Current Outpatient Medications   Medication Sig    gabapentin (NEURONTIN) 800 mg tablet TAKE 1 TABLET BY MOUTH THREE TIMES DAILY    Breo Ellipta 100-25 mcg/dose inhaler Take 1 Puff by inhalation daily.  Spiriva with HandiHaler 18 mcg inhalation capsule INHALE CONTENTS OF 1 CAPSULE ONCE DAILY USING HANDIHALER    amLODIPine (NORVASC) 10 mg tablet TAKE 1 TABLET BY MOUTH EVERY DAY    losartan (COZAAR) 100 mg tablet TAKE 1 TABLET BY MOUTH EVERY DAY    aspirin delayed-release 81 mg tablet TAKE 1 TABLET BY MOUTH EVERY DAY    HYDROcodone-acetaminophen (NORCO) 5-325 mg per tablet Take 1 Tab by mouth every six (6) hours as needed.  diclofenac EC (VOLTAREN) 75 mg EC tablet     cyclobenzaprine (FLEXERIL) 10 mg tablet Take 10 mg by mouth three (3) times daily as needed.  aspirin/salicylamide/caffeine (BC HEADACHE POWDER PO) Take  by mouth.  naproxen (NAPROSYN) 500 mg tablet Take 1 Tab by mouth two (2) times daily (with meals). No current facility-administered medications for this visit. SYSTEM REVIEW NOT RELATED TO LIVER DISEASE OR REVIEWED ABOVE:  Constitution systems: Negative for fever, chills, weight gain, weight loss. Eyes: Negative for visual changes. ENT: Negative for sore throat, painful swallowing. Respiratory: Negative for cough, hemoptysis, SOB. Cardiology: Negative for chest pain, palpitations. GI:  Negative for constipation or diarrhea. : Negative for urinary frequency, dysuria, hematuria, nocturia. Skin: Negative for rash. Hematology: Negative for easy bruising, blood clots. Musculo-skelatal: Negative for back pain, muscle pain, weakness. Neurologic: Negative for headaches, dizziness, vertigo, memory problems not related to HE. Psychology: Negative for anxiety, depression. FAMILY HISTORY:  The parents are   There is no family history of liver disease. There is no family history of immune disorders. SOCIAL HISTORY:  The patient is . The patient has 7  Children and many grandchildren  The patient currently smokes 3- 4 cigarettes daily  The patient has previously consumed alcohol in excess. He binge drink on alcohol often. The patient is currently on disability    PHYSICAL EXAMINATION:  Visit Vitals  BP (!) 135/93 (BP 1 Location: Right arm, BP Patient Position: Sitting)   Pulse 76   Temp 97.8 °F (36.6 °C) (Temporal)   Resp 18   Ht 6' (1.829 m)   Wt 290 lb (131.5 kg)   SpO2 97%   BMI 39.33 kg/m²     General: No acute distress. Eyes: Sclera anicteric. ENT: No oral lesions. Thyroid normal.  Nodes: No adenopathy. Skin: No spider angiomata. No jaundice. No palmar erythema. Respiratory: Lungs clear to auscultation. Cardiovascular: Regular heart rate. No murmurs. No JVD. Abdomen: Soft non-tender. Liver size normal to percussion/palpation. Spleen not palpable. No obvious ascites. Extremities: No edema. No muscle wasting. No gross arthritic changes. Neurologic: Alert and oriented. Cranial nerves grossly intact. No asterixis. LABORATORY STUDIES:  Recent liver function panel, CBC with platelet count and BMP are not available. These studies will be performed. Liver [facility] of [address] [place] [address] Ref Rng & Units 11/24/2020 11/2/2020   WBC 4.1 - 11.1 K/uL  4.2   ANC 1.8 - 8.0 K/UL  1.6 (L)   HGB 12.1 - 17.0 g/dL  17.4 (H)    - 400 K/uL  199   AST 15 - 37 U/L 112 (H) 211 (H)   ALT 12 - 78 U/L 265 (H) 315 (H)   Alk Phos 45 - 117 U/L 92 104   Bili, Total 0.2 - 1.0 MG/DL 0.5 0.4   Bili, Direct 0.0 - 0.2 MG/DL 0.2    Albumin 3.5 - 5.0 g/dL 3.9 4.1   BUN 6 - 20 MG/DL  12   Creat 0.70 - 1.30 MG/DL  1.28   Na 136 - 145 mmol/L  139   K 3.5 - 5.1 mmol/L  4.3   Cl 97 - 108 mmol/L  107   CO2 21 - 32 mmol/L  23   Glucose 65 - 100 mg/dL  92       SEROLOGIES:  Not available or performed. Testing was performed today.   Serologies Latest Ref Rng & Units 12/15/2020 11/24/2020   Hep A Ab, Total Negative Negative    Hep B Surface Ag Index  <0.10   Hep B Surface Ag Interp Negative    Negative   Hep B Core Ab, Total Negative Negative    Hep B Surface AB QL  Reactive    Hep C Ab NONREACTIVE NONREACTIVE   Ferritin 30 - 400 ng/mL 259    Iron % Saturation 15 - 55 % 40    TKIA, IFA  Negative    ASMCA 0 - 19 Units 25 (H)    Alpha-1 antitrypsin level 101 - 187 mg/dL 138      LIVER HISTOLOGY:  Not available or performed    ENDOSCOPIC PROCEDURES:  Not available or performed    RADIOLOGY:  12/2020: Liver US: Liver demonstrate homogeneous echogenicity. The main portal vein is patent and demonstrates antegrade flow. Visualized hepatic veins are patent. OTHER TESTING:  Not available or performed    FOLLOW-UP:  All of the issues listed above in the Assessment and Plan were discussed with the patient. All questions were answered. The patient expressed a clear understanding of the above. [address] [place] [address] in 8 weeks for routine monitoring and to review all data and determine the treatment plan.     Arti Anaya MD, MPH  Advanced Hepatology  [facility] of [address] [address] [address], [address] [place] [address].  [phone]  [address] [place] [address]

## 2020-12-15 NOTE — PATIENT INSTRUCTIONS
We recommend you loose 48 pounds    We recommend mediterranean diet     Please abstain from all alcoholic beverages for now  Learning About the [address] Diet  What is the Mediterranean diet? The Mediterranean diet is a style of eating rather than a diet plan. It features foods eaten in [place], [place], [place] and [place], and other countries along the [facility]. It emphasizes eating foods like fish, fruits, vegetables, beans, high-fiber breads and whole grains, nuts, and olive oil. This style of eating includes limited red meat, cheese, and sweets. Why choose the Mediterranean diet? A Mediterranean-style diet may improve heart health. It contains more fat than other heart-healthy diets. But the fats are mainly from nuts, unsaturated oils (such as fish oils and olive oil), and certain nut or seed oils (such as canola, soybean, or flaxseed oil). These fats may help protect the heart and blood vessels. How can you get started on the Mediterranean diet? Here are some things you can do to switch to a more Mediterranean way of eating. What to eat  · Eat a variety of fruits and vegetables each day, such as grapes, blueberries, tomatoes, broccoli, peppers, figs, olives, spinach, eggplant, beans, lentils, and chickpeas. · Eat a variety of whole-grain foods each day, such as oats, brown rice, and whole wheat bread, pasta, and couscous. · Eat fish at least 2 times a week. Try tuna, salmon, mackerel, lake trout, herring, or sardines. · Eat moderate amounts of low-fat dairy products, such as milk, cheese, or yogurt. · Eat moderate amounts of poultry and eggs. · Choose healthy (unsaturated) fats, such as nuts, olive oil, and certain nut or seed oils like canola, soybean, and flaxseed. · Limit unhealthy (saturated) fats, such as butter, palm oil, and coconut oil. And limit fats found in animal products, such as meat and dairy products made with whole milk.  Try to eat red meat only a few times a month in very small amounts. · Limit sweets and desserts to only a few times a week. This includes sugar-sweetened drinks like soda. The Mediterranean diet may also include red wine with your meal--1 glass each day for women and up to 2 glasses a day for men. Tips for eating at home  · Use herbs, spices, garlic, lemon zest, and citrus juice instead of salt to add flavor to foods. · Add avocado slices to your sandwich instead of morelos. · Have fish for lunch or dinner instead of red meat. Brush the fish with olive oil, and broil or grill it. · Sprinkle your salad with seeds or nuts instead of cheese. · Cook with olive or canola oil instead of butter or oils that are high in saturated fat. · Switch from 2% milk or whole milk to 1% or fat-free milk. · Dip raw vegetables in a vinaigrette dressing or hummus instead of dips made from mayonnaise or sour cream.  · Have a piece of fruit for dessert instead of a piece of cake. Try baked apples, or have some dried fruit. Tips for eating out  · Try broiled, grilled, baked, or poached fish instead of having it fried or breaded. · Ask your  to have your meals prepared with olive oil instead of butter. · Order dishes made with marinara sauce or sauces made from olive oil. Avoid sauces made from cream or mayonnaise. · Choose whole-grain breads, whole wheat pasta and pizza crust, brown rice, beans, and lentils. · Cut back on butter or margarine on bread. Instead, you can dip your bread in a small amount of olive oil. · Ask for a side salad or grilled vegetables instead of french fries or chips. Where can you learn more? Go to http://www.[employer].com/  Enter O407 in the search box to learn more about \"Learning About the Mediterranean Diet. \"  Current as of: August 22, 2019               Content Version: 12.6  © 7501-3899 [employer].    Care instructions adapted under license by [employer] (which disclaims liability or warranty for this information). If you have questions about a medical condition or this instruction, always ask your healthcare professional. [place] [address] any warranty or liability for your use of this information.

## 2020-12-15 NOTE — LETTER
1/3/2021 Patient: Jocelyne Flowers YOB: 1955 Date of Visit: 12/15/2020 Tyrone Red MD 
[address] [address] [place] [address] [address] [employer] Dear Tyrone Red MD, Thank you for referring Mr. Devaughn Hdz to [address] for evaluation. My notes for this consultation are attached. If you have questions, please do not hesitate to call me. I look forward to following your patient along with you. Sincerely, Arti Anaya MD

## 2020-12-16 LAB
A1AT SERPL-MCNC: 138 MG/DL (ref 101–187)
ACTIN IGG SERPL-ACNC: 25 UNITS (ref 0–19)
FERRITIN SERPL-MCNC: 259 NG/ML (ref 30–400)
HAV AB SER QL IA: NEGATIVE
HBV CORE AB SERPL QL IA: NEGATIVE
HBV SURFACE AB SER QL: REACTIVE
IRON SATN MFR SERPL: 40 % (ref 15–55)
IRON SERPL-MCNC: 132 UG/DL (ref 38–169)
TIBC SERPL-MCNC: 333 UG/DL (ref 250–450)
UIBC SERPL-MCNC: 201 UG/DL (ref 111–343)

## 2020-12-18 LAB — ANA TITR SER IF: NEGATIVE {TITER}

## 2021-01-03 PROBLEM — R74.8 ABNORMAL LIVER ENZYMES: Status: ACTIVE | Noted: 2021-01-03

## 2021-01-11 ENCOUNTER — TELEPHONE (OUTPATIENT)
Dept: FAMILY MEDICINE CLINIC | Age: 66
End: 2021-01-11

## 2021-01-13 DIAGNOSIS — M50.30 DDD (DEGENERATIVE DISC DISEASE), CERVICAL: ICD-10-CM

## 2021-01-13 RX ORDER — GABAPENTIN 800 MG/1
TABLET ORAL
Qty: 90 TAB | Refills: 0 | Status: SHIPPED | OUTPATIENT
Start: 2021-01-13 | End: 2021-02-12 | Stop reason: SDUPTHER

## 2021-02-12 DIAGNOSIS — M50.30 DDD (DEGENERATIVE DISC DISEASE), CERVICAL: ICD-10-CM

## 2021-02-12 RX ORDER — GABAPENTIN 800 MG/1
TABLET ORAL
Qty: 90 TAB | Refills: 0 | Status: SHIPPED | OUTPATIENT
Start: 2021-02-12 | End: 2021-03-19 | Stop reason: SDUPTHER

## 2021-03-19 DIAGNOSIS — M50.30 DDD (DEGENERATIVE DISC DISEASE), CERVICAL: ICD-10-CM

## 2021-03-19 RX ORDER — GABAPENTIN 800 MG/1
TABLET ORAL
Qty: 90 TAB | Refills: 0 | Status: SHIPPED | OUTPATIENT
Start: 2021-03-19 | End: 2021-04-21 | Stop reason: SDUPTHER

## 2021-03-31 ENCOUNTER — OFFICE VISIT (OUTPATIENT)
Dept: HEMATOLOGY | Age: 66
End: 2021-03-31
Payer: MEDICARE

## 2021-03-31 VITALS
RESPIRATION RATE: 22 BRPM | HEIGHT: 72 IN | TEMPERATURE: 97.3 F | WEIGHT: 285.8 LBS | BODY MASS INDEX: 38.71 KG/M2 | DIASTOLIC BLOOD PRESSURE: 88 MMHG | SYSTOLIC BLOOD PRESSURE: 124 MMHG | OXYGEN SATURATION: 97 % | HEART RATE: 74 BPM

## 2021-03-31 DIAGNOSIS — R74.8 ABNORMAL LIVER ENZYMES: Primary | ICD-10-CM

## 2021-03-31 PROCEDURE — G8754 DIAS BP LESS 90: HCPCS | Performed by: HOSPITALIST

## 2021-03-31 PROCEDURE — 1101F PT FALLS ASSESS-DOCD LE1/YR: CPT | Performed by: HOSPITALIST

## 2021-03-31 PROCEDURE — G8417 CALC BMI ABV UP PARAM F/U: HCPCS | Performed by: HOSPITALIST

## 2021-03-31 PROCEDURE — 3017F COLORECTAL CA SCREEN DOC REV: CPT | Performed by: HOSPITALIST

## 2021-03-31 PROCEDURE — G8752 SYS BP LESS 140: HCPCS | Performed by: HOSPITALIST

## 2021-03-31 PROCEDURE — G8536 NO DOC ELDER MAL SCRN: HCPCS | Performed by: HOSPITALIST

## 2021-03-31 PROCEDURE — G8427 DOCREV CUR MEDS BY ELIG CLIN: HCPCS | Performed by: HOSPITALIST

## 2021-03-31 PROCEDURE — G8432 DEP SCR NOT DOC, RNG: HCPCS | Performed by: HOSPITALIST

## 2021-03-31 PROCEDURE — 99214 OFFICE O/P EST MOD 30 MIN: CPT | Performed by: HOSPITALIST

## 2021-03-31 NOTE — LETTER
4/28/2021 Patient: Heather Huang YOB: 1955 Date of Visit: 3/31/2021 Dana Tobin MD 
[address]  [address] [place] [address] [address] [employer] Dear Dana Tobin MD, Thank you for referring Mr. Bre Galvez to [address] for evaluation. My notes for this consultation are attached. If you have questions, please do not hesitate to call me. I look forward to following your patient along with you. Sincerely, Ulysses Breslow, MD

## 2021-03-31 NOTE — PROGRESS NOTES
Claudia Pour [address]      Omar Will MD, Perla Hernandez, Tomasz White MD, MPH      Emanuel Solomon, PA-C Vito Landau, [facility]     Shani Ross, [facility]   Daron Murcia, P-ALBERTA Rojas, [facility]       Kj Bourgeois [address]    at [address], [facility] Marcin Plascencia  22. [phone]    FAX: [address] [place] [address], [address]    [phone]    FAX: [phone]     Patient Care Team:  Don Diaz MD as PCP - General (Family Medicine)  Don Diaz MD as PCP - [facility] Provider    Problem List  Date Reviewed: 1/3/2021          Codes Class Noted    Abnormal liver enzymes ICD-10-CM: R74.8  ICD-9-CM: 790.5  1/3/2021        Severe obesity ([facility] ICD-10-CM: E66.01  ICD-9-CM: 278.01  12/16/2019        Peripheral vascular disease ([facility] ICD-10-CM: I73.9  ICD-9-CM: 443.9  12/16/2019        Pulmonary emphysema ([facility] ICD-10-CM: J43.9  ICD-9-CM: 492.8  12/16/2019        DDD (degenerative disc disease), cervical ICD-10-CM: M50.30  ICD-9-CM: 722.4  12/16/2019        Essential hypertension ICD-10-CM: I10  ICD-9-CM: 401.9  12/16/2019        HELGA (obstructive sleep apnea) (Chronic) ICD-10-CM: X64.99  ICD-9-CM: 327.23  12/16/2019             Carrillo Brumfield [facility] the [facility] for follow up and management of elevated liver enzymes   All medical records sent by the referring physicians were reviewed including imaging studies     The patient is a 72 y.o.  Black male who was found to have elevated liver enzymes in 10/2020    Blood work performed in 11/2020 showed , , , Total bilirubin 0.4, serum platelet 097     Serologic evaluation for markers of chronic liver disease was positive for ASMA, and hepatitis B surface Ab. Hepatitis A total Ab, HBsAg, HCV,  ferritin, iron saturation, A1AT were all negative    Imaging of the liver performed with ultrasound in 12/2020 was normal    An assessment of liver fibrosis with biopsy or elastography has not been performed. The patient had not started any new medications within 3 months preceding the elevation in liver chemistries. The patient has no symptoms which can be attributed to the liver disorder. The patient is not currently experiencing the following symptoms of liver disease: fatigue, pain in the right side over the liver, yellowing of the eyes or skin,   problems concentrating, swelling of the abdomen, swelling of the lower extremities, hematemesis, hematochezia. Patient has a history of heavy alcohol abuse and often binge drink on alcohol often    The patient completes all daily activities without any functional limitations. Since last appointment  Patient was last seen 12/15  There was no hospital admission or ER visit  He does not have any complaints. There is no abdominal pain over the liver, no jaundice, ascites or lower extremity swelling  He still admits to drinking alcohol. He consumes mainly liquour mixed with orange jouce, 1/2 a gallon mostly every weekend      ASSESSMENT AND PLAN:    Elevated liver enzymes  Persistent elevation in liver transaminases of unclear etiology at this time. AST is elevated, ALT is elevated, ALP is normal. Liver function is normal. The platelet count is normal.      Based upon laboratory studies and imaging the patient does not appear to have advance liver disease or cirrhosis.     Serologic testing for causes of chronic liver disease was positive for ASMA which indicates patient is genetically predispose to immune liver disease    The most likely causes for the liver chemistry abnormalities were discussed with the patient and include alcoholic liver diease vs immune mediated liver disease    Recommend patient abstain from alcohol and if liver enzymes remain persistently elevated after alcohol abstinence then we will perform liver biopsy    We will repeat laboratory studies to monitor liver enzymes and function. This will include CBC, CMP  We will perform fibro scan of the liver on next clinic visit    Alcohol liver disease  Suspect the patient has alcohol induced liver disease based upon a history of consuming significant alcohol on a daily basis for many years, a history of consuming alcohol in excess, serology that is negative for other causes of chronic liver disease. The histologic severity has not been defined. Recommend patient abstain from all alcoholic beverages    Screening for Hepatocellular Carcinoma  HCC screening is not necessary if the patient has no evidence of cirrhosis. Treatment of other medical problems in patients with chronic liver disease  There are no contraindications for the patient to take most medications that are necessary for treatment of other medical issues. The patient consumes alcohol on a daily basis or has recently stopped consuming alcohol. Regular alcohol use increases the risk of toxicity from acetaminophen. This analgesic should be avoided until the patient has been abstinent from alcohol for 6 months. Counseling for alcohol in patients with chronic liver disease  The patient was counseled regarding alcohol consumption and the effect of alcohol on chronic liver disease. The patient has a history of heavy alcohol abuse and he binge drink on alcohol. It was recommended that all alcohol consumption be stopped and the patient be abstinent from all alcoholic beverages  If the patient cannot stop consuming alcohol then there is an alcohol abuse disorder and the patient should consider entering alcohol counseling and/or attend AA.     Vaccinations   The need for vaccination against viral hepatitis B is not needed  Vaccination for hepatitis A is recommended  Routine vaccinations against other bacterial and viral agents can be performed as indicated. Annual flu vaccination should be administered if indicated. ALLERGIES  No Known Allergies    MEDICATIONS  Current Outpatient Medications   Medication Sig    gabapentin (NEURONTIN) 800 mg tablet TAKE 1 TABLET BY MOUTH THREE TIMES DAILY    HYDROcodone-acetaminophen (NORCO) 5-325 mg per tablet Take 1 Tab by mouth every six (6) hours as needed.  diclofenac EC (VOLTAREN) 75 mg EC tablet     cyclobenzaprine (FLEXERIL) 10 mg tablet Take 10 mg by mouth three (3) times daily as needed.  Breo Ellipta 100-25 mcg/dose inhaler Take 1 Puff by inhalation daily.  Spiriva with HandiHaler 18 mcg inhalation capsule INHALE CONTENTS OF 1 CAPSULE ONCE DAILY USING HANDIHALER    amLODIPine (NORVASC) 10 mg tablet TAKE 1 TABLET BY MOUTH EVERY DAY    losartan (COZAAR) 100 mg tablet TAKE 1 TABLET BY MOUTH EVERY DAY    aspirin delayed-release 81 mg tablet TAKE 1 TABLET BY MOUTH EVERY DAY    aspirin/salicylamide/caffeine (BC HEADACHE POWDER PO) Take  by mouth.  naproxen (NAPROSYN) 500 mg tablet Take 1 Tab by mouth two (2) times daily (with meals). No current facility-administered medications for this visit. SYSTEM REVIEW NOT RELATED TO LIVER DISEASE OR REVIEWED ABOVE:  Constitution systems: Negative for fever, chills, weight gain, weight loss. Eyes: Negative for visual changes. ENT: Negative for sore throat, painful swallowing. Respiratory: Negative for cough, hemoptysis, SOB. Cardiology: Negative for chest pain, palpitations. GI:  Negative for constipation or diarrhea. : Negative for urinary frequency, dysuria, hematuria, nocturia. Skin: Negative for rash. Hematology: Negative for easy bruising, blood clots. Musculo-skelatal: Negative for back pain, muscle pain, weakness. Neurologic: Negative for headaches, dizziness, vertigo, memory problems not related to HE.   Psychology: Negative for anxiety, depression. FAMILY HISTORY:  The parents are   There is no family history of liver disease. There is no family history of immune disorders. SOCIAL HISTORY:  The patient is . The patient has 7  Children and many grandchildren  The patient currently smokes 3- 4 cigarettes daily  The patient has previously consumed alcohol in excess. He binge drink on alcohol often. The patient is currently on disability    PHYSICAL EXAMINATION:  Visit Vitals  /88 (BP 1 Location: Right upper arm, BP Patient Position: Sitting, BP Cuff Size: Large adult)   Pulse 74   Temp 97.3 °F (36.3 °C) (Temporal)   Resp 22   Ht 6' (1.829 m)   Wt 285 lb 12.8 oz (129.6 kg)   SpO2 97%   BMI 38.76 kg/m²     General: No acute distress. Eyes: Sclera anicteric. ENT: No oral lesions. Thyroid normal.  Nodes: No adenopathy. Skin: No spider angiomata. No jaundice. No palmar erythema. Respiratory: Lungs clear to auscultation. Cardiovascular: Regular heart rate. No murmurs. No JVD. Abdomen: Soft non-tender. Liver size normal to percussion/palpation. Spleen not palpable. No obvious ascites. Extremities: No edema. No muscle wasting. No gross arthritic changes. Neurologic: Alert and oriented. Cranial nerves grossly intact. No asterixis. LABORATORY STUDIES:  Recent liver function panel, CBC with platelet count and BMP are not available. These studies will be performed.   [facility] [place] of [address] [place] [address] Units 3/31/2021 2020   WBC 3.4 - 10.8 x10E3/uL 6.2    ANC 1.4 - 7.0 x10E3/uL 3.1    HGB 13.0 - 17.7 g/dL 15.8     - 450 x10E3/uL 249    AST 0 - 40 IU/L 48 (H) 112 (H)   ALT 0 - 44 IU/L 76 (H) 265 (H)   Alk Phos 39 - 117 IU/L 67 92   Bili, Total 0.0 - 1.2 mg/dL 0.4 0.5   Bili, Direct 0.0 - 0.2 MG/DL  0.2   Albumin 3.8 - 4.8 g/dL 4.4 3.9   BUN 8 - 27 mg/dL 13    Creat 0.76 - 1.27 mg/dL 1.27    Na 134 - 144 mmol/L 138    K 3.5 - 5.2 mmol/L 4.2    Cl 96 - 106 mmol/L 102    CO2 20 - 29 mmol/L 21    Glucose 65 - 99 mg/dL 100 (H)        SEROLOGIES:  Not available or performed. Testing was performed today. Serologies Latest Ref Rng & Units 12/15/2020 11/24/2020   Hep A Ab, Total Negative Negative    Hep B Surface Ag Index  <0.10   Hep B Surface Ag Interp Negative    Negative   Hep B Core Ab, Total Negative Negative    Hep B Surface AB QL  Reactive    Hep C Ab NONREACTIVE    NONREACTIVE   Ferritin 30 - 400 ng/mL 259    Iron % Saturation 15 - 55 % 40    TIKA, IFA  Negative    ASMCA 0 - 19 Units 25 (H)    Alpha-1 antitrypsin level 101 - 187 mg/dL 138      LIVER HISTOLOGY:  Not available or performed    ENDOSCOPIC PROCEDURES:  Not available or performed    RADIOLOGY:  12/2020: Liver US: Liver demonstrate homogeneous echogenicity. The main portal vein is patent and demonstrates antegrade flow. Visualized hepatic veins are patent. OTHER TESTING:  Not available or performed    FOLLOW-UP:  All of the issues listed above in the Assessment and Plan were discussed with the patient. All questions were answered. The patient expressed a clear understanding of the above. [address] [place] [address] in 3 months for routine monitoring and to review all data and determine the treatment plan.     Neil Quintanilla MD, MPH  Advanced Hepatology  [facility] of [address] [address] Steven Morse, [address] [place] [address] 22.  [phone]  [address] [place] [address]

## 2021-03-31 NOTE — PROGRESS NOTES
Identified pt with two pt identifiers(name and ). Reviewed record in preparation for visit and have obtained necessary documentation. Chief Complaint   Patient presents with    Follow-up      Vitals:    21 1119   BP: 124/88   Pulse: 74   Resp: 22   Temp: 97.3 °F (36.3 °C)   TempSrc: Temporal   SpO2: 97%   Weight: 285 lb 12.8 oz (129.6 kg)   Height: 6' (1.829 m)   PainSc:   0 - No pain       Health Maintenance Review: Patient reminded of \"due or due soon\" health maintenance. I have asked the patient to contact his/her primary care provider (PCP) for follow-up on his/her health maintenance. Coordination of Care Questionnaire:  :   1) Have you been to an emergency room, urgent care, or hospitalized since your last visit? If yes, where when, and reason for visit? no       2. Have seen or consulted any other health care provider since your last visit? If yes, where when, and reason for visit? NO      Patient is accompanied by wife I have received verbal consent from Benedict Winn to discuss any/all medical information while they are present in the room.

## 2021-04-01 LAB
ALBUMIN SERPL-MCNC: 4.4 G/DL (ref 3.8–4.8)
ALBUMIN/GLOB SERPL: 1.4 {RATIO} (ref 1.2–2.2)
ALP SERPL-CCNC: 67 IU/L (ref 39–117)
ALT SERPL-CCNC: 76 IU/L (ref 0–44)
AST SERPL-CCNC: 48 IU/L (ref 0–40)
BASOPHILS # BLD AUTO: 0.1 X10E3/UL (ref 0–0.2)
BASOPHILS NFR BLD AUTO: 1 %
BILIRUB SERPL-MCNC: 0.4 MG/DL (ref 0–1.2)
BUN SERPL-MCNC: 13 MG/DL (ref 8–27)
BUN/CREAT SERPL: 10 (ref 10–24)
CALCIUM SERPL-MCNC: 9.3 MG/DL (ref 8.6–10.2)
CHLORIDE SERPL-SCNC: 102 MMOL/L (ref 96–106)
CO2 SERPL-SCNC: 21 MMOL/L (ref 20–29)
CREAT SERPL-MCNC: 1.27 MG/DL (ref 0.76–1.27)
EOSINOPHIL # BLD AUTO: 0.1 X10E3/UL (ref 0–0.4)
EOSINOPHIL NFR BLD AUTO: 2 %
ERYTHROCYTE [DISTWIDTH] IN BLOOD BY AUTOMATED COUNT: 13.2 % (ref 11.6–15.4)
GLOBULIN SER CALC-MCNC: 3.1 G/DL (ref 1.5–4.5)
GLUCOSE SERPL-MCNC: 100 MG/DL (ref 65–99)
HCT VFR BLD AUTO: 46.2 % (ref 37.5–51)
HGB BLD-MCNC: 15.8 G/DL (ref 13–17.7)
IMM GRANULOCYTES # BLD AUTO: 0 X10E3/UL (ref 0–0.1)
IMM GRANULOCYTES NFR BLD AUTO: 0 %
LYMPHOCYTES # BLD AUTO: 2.2 X10E3/UL (ref 0.7–3.1)
LYMPHOCYTES NFR BLD AUTO: 36 %
MCH RBC QN AUTO: 30.7 PG (ref 26.6–33)
MCHC RBC AUTO-ENTMCNC: 34.2 G/DL (ref 31.5–35.7)
MCV RBC AUTO: 90 FL (ref 79–97)
MONOCYTES # BLD AUTO: 0.7 X10E3/UL (ref 0.1–0.9)
MONOCYTES NFR BLD AUTO: 11 %
NEUTROPHILS # BLD AUTO: 3.1 X10E3/UL (ref 1.4–7)
NEUTROPHILS NFR BLD AUTO: 50 %
PLATELET # BLD AUTO: 249 X10E3/UL (ref 150–450)
POTASSIUM SERPL-SCNC: 4.2 MMOL/L (ref 3.5–5.2)
PROT SERPL-MCNC: 7.5 G/DL (ref 6–8.5)
RBC # BLD AUTO: 5.14 X10E6/UL (ref 4.14–5.8)
SODIUM SERPL-SCNC: 138 MMOL/L (ref 134–144)
WBC # BLD AUTO: 6.2 X10E3/UL (ref 3.4–10.8)

## 2021-04-05 RX ORDER — LOSARTAN POTASSIUM 100 MG/1
TABLET ORAL
Qty: 90 TAB | Refills: 1 | Status: SHIPPED | OUTPATIENT
Start: 2021-04-05 | End: 2021-09-28

## 2021-04-19 RX ORDER — AMLODIPINE BESYLATE 10 MG/1
TABLET ORAL
Qty: 90 TAB | Refills: 1 | Status: SHIPPED | OUTPATIENT
Start: 2021-04-19 | End: 2021-10-14

## 2021-04-21 DIAGNOSIS — M50.30 DDD (DEGENERATIVE DISC DISEASE), CERVICAL: ICD-10-CM

## 2021-04-21 RX ORDER — GABAPENTIN 800 MG/1
TABLET ORAL
Qty: 90 TAB | Refills: 0 | Status: SHIPPED | OUTPATIENT
Start: 2021-04-21 | End: 2021-05-17 | Stop reason: SDUPTHER

## 2021-04-26 DIAGNOSIS — J43.9 PULMONARY EMPHYSEMA, UNSPECIFIED EMPHYSEMA TYPE (HCC): ICD-10-CM

## 2021-04-26 RX ORDER — FLUTICASONE FUROATE AND VILANTEROL TRIFENATATE 100; 25 UG/1; UG/1
1 POWDER RESPIRATORY (INHALATION) DAILY
Qty: 1 INHALER | Refills: 5 | Status: SHIPPED | OUTPATIENT
Start: 2021-04-26 | End: 2021-10-11

## 2021-05-17 ENCOUNTER — OFFICE VISIT (OUTPATIENT)
Dept: FAMILY MEDICINE CLINIC | Age: 66
End: 2021-05-17
Payer: MEDICARE

## 2021-05-17 VITALS
HEART RATE: 73 BPM | BODY MASS INDEX: 39.01 KG/M2 | SYSTOLIC BLOOD PRESSURE: 156 MMHG | DIASTOLIC BLOOD PRESSURE: 92 MMHG | HEIGHT: 72 IN | WEIGHT: 288 LBS | OXYGEN SATURATION: 98 % | TEMPERATURE: 98 F | RESPIRATION RATE: 20 BRPM

## 2021-05-17 DIAGNOSIS — I73.9 PERIPHERAL VASCULAR DISEASE (HCC): ICD-10-CM

## 2021-05-17 DIAGNOSIS — Z23 ENCOUNTER FOR IMMUNIZATION: ICD-10-CM

## 2021-05-17 DIAGNOSIS — E66.01 SEVERE OBESITY (HCC): ICD-10-CM

## 2021-05-17 DIAGNOSIS — I10 ESSENTIAL HYPERTENSION: ICD-10-CM

## 2021-05-17 DIAGNOSIS — M50.30 DDD (DEGENERATIVE DISC DISEASE), CERVICAL: ICD-10-CM

## 2021-05-17 DIAGNOSIS — J43.9 PULMONARY EMPHYSEMA, UNSPECIFIED EMPHYSEMA TYPE (HCC): ICD-10-CM

## 2021-05-17 DIAGNOSIS — R30.9 URINARY PAIN: Primary | ICD-10-CM

## 2021-05-17 LAB
BILIRUB UR QL STRIP: NEGATIVE
GLUCOSE UR-MCNC: NEGATIVE MG/DL
KETONES P FAST UR STRIP-MCNC: NEGATIVE MG/DL
PH UR STRIP: 6.5 [PH] (ref 4.6–8)
PROT UR QL STRIP: NEGATIVE
SP GR UR STRIP: 1.01 (ref 1–1.03)
UA UROBILINOGEN AMB POC: NORMAL (ref 0.2–1)
URINALYSIS CLARITY POC: CLEAR
URINALYSIS COLOR POC: NORMAL
URINE BLOOD POC: NEGATIVE
URINE LEUKOCYTES POC: NEGATIVE
URINE NITRITES POC: NEGATIVE

## 2021-05-17 PROCEDURE — G0009 ADMIN PNEUMOCOCCAL VACCINE: HCPCS | Performed by: FAMILY MEDICINE

## 2021-05-17 PROCEDURE — 99214 OFFICE O/P EST MOD 30 MIN: CPT | Performed by: FAMILY MEDICINE

## 2021-05-17 PROCEDURE — G8536 NO DOC ELDER MAL SCRN: HCPCS | Performed by: FAMILY MEDICINE

## 2021-05-17 PROCEDURE — G8427 DOCREV CUR MEDS BY ELIG CLIN: HCPCS | Performed by: FAMILY MEDICINE

## 2021-05-17 PROCEDURE — 1101F PT FALLS ASSESS-DOCD LE1/YR: CPT | Performed by: FAMILY MEDICINE

## 2021-05-17 PROCEDURE — G8753 SYS BP > OR = 140: HCPCS | Performed by: FAMILY MEDICINE

## 2021-05-17 PROCEDURE — G8417 CALC BMI ABV UP PARAM F/U: HCPCS | Performed by: FAMILY MEDICINE

## 2021-05-17 PROCEDURE — 81002 URINALYSIS NONAUTO W/O SCOPE: CPT | Performed by: FAMILY MEDICINE

## 2021-05-17 PROCEDURE — G8755 DIAS BP > OR = 90: HCPCS | Performed by: FAMILY MEDICINE

## 2021-05-17 PROCEDURE — G8510 SCR DEP NEG, NO PLAN REQD: HCPCS | Performed by: FAMILY MEDICINE

## 2021-05-17 PROCEDURE — 90732 PPSV23 VACC 2 YRS+ SUBQ/IM: CPT | Performed by: FAMILY MEDICINE

## 2021-05-17 PROCEDURE — 3017F COLORECTAL CA SCREEN DOC REV: CPT | Performed by: FAMILY MEDICINE

## 2021-05-17 RX ORDER — GABAPENTIN 800 MG/1
TABLET ORAL
Qty: 270 TAB | Refills: 1 | Status: SHIPPED | OUTPATIENT
Start: 2021-05-17 | End: 2021-12-03 | Stop reason: SDUPTHER

## 2021-05-17 NOTE — PROGRESS NOTES
Patient here for urinary pain and sexual pain. Patient states it hurts when he ejaculates as well. This has been going on for about one month. 1. Have you been to the ER, urgent care clinic since your last visit? Hospitalized since your last visit? No    2. Have you seen or consulted any other health care providers outside of the [address] [place] [address] since your last visit? Include any pap smears or colon screening. No   Results for orders placed or performed in visit on 05/17/21   AMB POC URINALYSIS DIP STICK MANUAL W/O MICRO   Result Value Ref Range    Color (UA POC) Light Yellow     Clarity (UA POC) Clear     Glucose (UA POC) Negative Negative    Bilirubin (UA POC) Negative Negative    Ketones (UA POC) Negative Negative    Specific gravity (UA POC) 1.010 1.001 - 1.035    Blood (UA POC) Negative Negative    pH (UA POC) 6.5 4.6 - 8.0    Protein (UA POC) Negative Negative    Urobilinogen (UA POC) 0.2 mg/dL 0.2 - 1    Nitrites (UA POC) Negative Negative    Leukocyte esterase (UA POC) Negative Negative            Chief Complaint   Patient presents with    Urinary Pain     He is a 72 y.o. male who presents for evalution. Reviewed PmHx, RxHx, FmHx, SocHx, AllgHx and updated and dated in the chart.     Patient Active Problem List    Diagnosis    Abnormal liver enzymes    Severe obesity ([address]    Peripheral vascular disease ([facility]    Pulmonary emphysema ([facility]    DDD (degenerative disc disease), cervical    Essential hypertension    HLEGA (obstructive sleep apnea)       Review of Systems - negative except as listed above in the HPI    Objective:     Vitals:    05/17/21 1358   BP: (!) 156/92   Pulse: 73   Resp: 20   Temp: 98 °F (36.7 °C)   SpO2: 98%   Weight: 288 lb (130.6 kg)   Height: 6' (1.829 m)     Physical Examination: General appearance - alert, well appearing, and in no distress  Chest - clear to auscultation, no wheezes, rales or rhonchi, symmetric air entry  Heart - normal rate, regular rhythm, normal S1, S2, no murmurs, rubs, clicks or gallops  Abdomen - soft, nontender, nondistended, no masses or organomegaly    Assessment/ Plan:   Diagnoses and all orders for this visit:    1. Urinary pain  -     AMB POC URINALYSIS DIP STICK MANUAL W/O MICRO  Results for orders placed or performed in visit on 05/17/21   AMB POC URINALYSIS DIP STICK MANUAL W/O MICRO   Result Value Ref Range    Color (UA POC) Light Yellow     Clarity (UA POC) Clear     Glucose (UA POC) Negative Negative    Bilirubin (UA POC) Negative Negative    Ketones (UA POC) Negative Negative    Specific gravity (UA POC) 1.010 1.001 - 1.035    Blood (UA POC) Negative Negative    pH (UA POC) 6.5 4.6 - 8.0    Protein (UA POC) Negative Negative    Urobilinogen (UA POC) 0.2 mg/dL 0.2 - 1    Nitrites (UA POC) Negative Negative    Leukocyte esterase (UA POC) Negative Negative     -neg UA    2. Essential hypertension  -inc today  -dwp dec salt    3. Encounter for immunization  -     PNEUMOCOCCAL POLYSACCHARIDE VACCINE, 23-VALENT, ADULT OR IMMUNOSUPPRESSED PT DOSE,  -     ADMIN INFLUENZA VIRUS VAC    4. Pulmonary emphysema, unspecified emphysema type ([address]  -stable    5. Peripheral vascular disease (HCC)  -stable    6. Severe obesity ([address]  -dwp diet             I have discussed the diagnosis with the patient and the intended plan as seen in the above orders. The patient understands and agrees with the plan. The patient has received an after-visit summary and questions were answered concerning future plans. Medication Side Effects and Warnings were discussed with patient  Patient Labs were reviewed and or requested:  Patient Past Records were reviewed and or requested    Weston Saab M.D. There are no Patient Instructions on file for this visit.

## 2021-05-26 RX ORDER — CEPHALEXIN 500 MG/1
1000 CAPSULE ORAL 2 TIMES DAILY
Qty: 40 CAPSULE | Refills: 0 | Status: SHIPPED | OUTPATIENT
Start: 2021-05-26 | End: 2021-06-05

## 2021-08-10 ENCOUNTER — OFFICE VISIT (OUTPATIENT)
Dept: HEMATOLOGY | Age: 66
End: 2021-08-10
Payer: MEDICARE

## 2021-08-10 VITALS
DIASTOLIC BLOOD PRESSURE: 74 MMHG | HEIGHT: 72 IN | SYSTOLIC BLOOD PRESSURE: 122 MMHG | WEIGHT: 282 LBS | BODY MASS INDEX: 38.19 KG/M2 | RESPIRATION RATE: 17 BRPM | HEART RATE: 74 BPM | OXYGEN SATURATION: 98 % | TEMPERATURE: 98 F

## 2021-08-10 DIAGNOSIS — R74.8 ABNORMAL LIVER ENZYMES: Primary | ICD-10-CM

## 2021-08-10 LAB
ALBUMIN SERPL-MCNC: 4.2 G/DL (ref 3.5–5)
ALBUMIN/GLOB SERPL: 1.1 {RATIO} (ref 1.1–2.2)
ALP SERPL-CCNC: 72 U/L (ref 45–117)
ALT SERPL-CCNC: 62 U/L (ref 12–78)
ANION GAP SERPL CALC-SCNC: 5 MMOL/L (ref 5–15)
AST SERPL-CCNC: 33 U/L (ref 15–37)
BILIRUB SERPL-MCNC: 0.4 MG/DL (ref 0.2–1)
BUN SERPL-MCNC: 16 MG/DL (ref 6–20)
BUN/CREAT SERPL: 14 (ref 12–20)
CALCIUM SERPL-MCNC: 9.1 MG/DL (ref 8.5–10.1)
CHLORIDE SERPL-SCNC: 105 MMOL/L (ref 97–108)
CO2 SERPL-SCNC: 28 MMOL/L (ref 21–32)
CREAT SERPL-MCNC: 1.12 MG/DL (ref 0.7–1.3)
GLOBULIN SER CALC-MCNC: 3.7 G/DL (ref 2–4)
GLUCOSE SERPL-MCNC: 88 MG/DL (ref 65–100)
POTASSIUM SERPL-SCNC: 4.4 MMOL/L (ref 3.5–5.1)
PROT SERPL-MCNC: 7.9 G/DL (ref 6.4–8.2)
SODIUM SERPL-SCNC: 138 MMOL/L (ref 136–145)

## 2021-08-10 PROCEDURE — G8427 DOCREV CUR MEDS BY ELIG CLIN: HCPCS | Performed by: HOSPITALIST

## 2021-08-10 PROCEDURE — G8510 SCR DEP NEG, NO PLAN REQD: HCPCS | Performed by: HOSPITALIST

## 2021-08-10 PROCEDURE — 1101F PT FALLS ASSESS-DOCD LE1/YR: CPT | Performed by: HOSPITALIST

## 2021-08-10 PROCEDURE — G8754 DIAS BP LESS 90: HCPCS | Performed by: HOSPITALIST

## 2021-08-10 PROCEDURE — 91200 LIVER ELASTOGRAPHY: CPT | Performed by: HOSPITALIST

## 2021-08-10 PROCEDURE — G8536 NO DOC ELDER MAL SCRN: HCPCS | Performed by: HOSPITALIST

## 2021-08-10 PROCEDURE — 99214 OFFICE O/P EST MOD 30 MIN: CPT | Performed by: HOSPITALIST

## 2021-08-10 PROCEDURE — G8417 CALC BMI ABV UP PARAM F/U: HCPCS | Performed by: HOSPITALIST

## 2021-08-10 PROCEDURE — 3017F COLORECTAL CA SCREEN DOC REV: CPT | Performed by: HOSPITALIST

## 2021-08-10 PROCEDURE — G8752 SYS BP LESS 140: HCPCS | Performed by: HOSPITALIST

## 2021-08-10 NOTE — PROGRESS NOTES
Identified pt with two pt identifiers(name and ). Reviewed record in preparation for visit and have obtained necessary documentation. Chief Complaint   Patient presents with    Other     Abnormal liver enzymes   Fibroscan f/u      Vitals:    08/10/21 1508   BP: 122/74   Pulse: 74   Resp: 17   Temp: 98 °F (36.7 °C)   TempSrc: Temporal   SpO2: 98%   Weight: 282 lb (127.9 kg)   Height: 6' (1.829 m)   PainSc:   0 - No pain       Health Maintenance Review: Patient reminded of \"due or due soon\" health maintenance. I have asked the patient to contact his/her primary care provider (PCP) for follow-up on his/her health maintenance. Coordination of Care Questionnaire:  :   1) Have you been to an emergency room, urgent care, or hospitalized since your last visit? If yes, where when, and reason for visit? no       2. Have seen or consulted any other health care provider since your last visit? If yes, where when, and reason for visit? NO      Patient is accompanied by wife I have received verbal consent from Hugo Drew to discuss any/all medical information while they are present in the room.

## 2021-08-10 NOTE — PROGRESS NOTES
[address]      Effie Lobato MD, Jud Krause, Canelo Mcintyre MD, MPH      Jovita Mandujano, PA-ALBERTA Dinh, [facility]-BC     April S Cody, [facility]   Alejandra De Jseus, FNP-C    Deon Corrales, [facility]       Kj Bourgeois Carl De Sr 136    at [address], [facility] Marcin Plascencia  22.    [phone]    FAX: [address] [place] [address], [address]    [phone]    FAX: [phone]     Patient Care Team:  Taurus Lozada MD as PCP - General (Family Medicine)  Taurus Lozada MD as PCP - [facility] Provider    Problem List  Date Reviewed: 5/17/2021        Codes Class Noted    Abnormal liver enzymes ICD-10-CM: R74.8  ICD-9-CM: 790.5  1/3/2021        Severe obesity ([facility] [address] ICD-10-CM: E66.01  ICD-9-CM: 278.01  12/16/2019        Peripheral vascular disease ([facility] [address] ICD-10-CM: I73.9  ICD-9-CM: 443.9  12/16/2019        Pulmonary emphysema ([facility] [address] ICD-10-CM: J43.9  ICD-9-CM: 492.8  12/16/2019        DDD (degenerative disc disease), cervical ICD-10-CM: M50.30  ICD-9-CM: 722.4  12/16/2019        Essential hypertension ICD-10-CM: I10  ICD-9-CM: 401.9  12/16/2019        HELGA (obstructive sleep apnea) (Chronic) ICD-10-CM: F25.40  ICD-9-CM: 327.23  12/16/2019             Javed Rizzo [facility] for follow up and management of elevated liver enzymes   All medical records sent by the referring physicians were reviewed including imaging studies     The patient is a 77 y.o.  Black male who was found to have elevated liver enzymes in 10/2020    Blood work performed in 11/2020 showed , , , Total bilirubin 0.4, serum platelet 649     Serologic evaluation for markers of chronic liver disease was positive for ASMA, and hepatitis B surface Ab. Hepatitis A total Ab, HBsAg, HCV,  ferritin, iron saturation, A1AT were all negative    Imaging of the liver performed with ultrasound in 12/2020 was normal     FibroScan performed at [address]. EkPa was 7.6,  IQR/med 45%.   The results suggested a fibrosis level of F1. The CAP score suggests there is hepatic steatosis. The patient had not started any new medications within 3 months preceding the elevation in liver chemistries. The patient has no symptoms which can be attributed to the liver disorder. The patient is not currently experiencing the following symptoms of liver disease: fatigue, pain in the right side over the liver, yellowing of the eyes or skin,   problems concentrating, swelling of the abdomen, swelling of the lower extremities, hematemesis, hematochezia. Patient has a history of heavy alcohol abuse and often binge drink on alcohol often    The patient completes all daily activities without any functional limitations. Since last appointment  Patient was last seen in 03/2021  There was no hospital admission or ER visit  He does not have any complaints. There is no abdominal pain over the liver, no jaundice, ascites or lower extremity swelling    He continues to drink alcohol. He consumes mainly liquour mixed with orange jouce, 1/2 a gallon mostly every weekend      ASSESSMENT AND PLAN:    Elevated liver enzymes  Persistent elevation in liver transaminases of unclear etiology at this time. AST is elevated, ALT is elevated, ALP is normal. Liver function is normal. The platelet count is normal.      Based upon laboratory studies and imaging the patient does not appear to have advance liver disease or cirrhosis.     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 CAP score suggest hepatic steatosis. 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 The histologic severity performed with fibro scan suggest F2 fibrosis   Recommend patient abstain from all alcoholic beverages    Screening for Hepatocellular Carcinoma  HCC screening is not necessary if the patient has no evidence of cirrhosis. Treatment of other medical problems in patients with chronic liver disease  There are no contraindications for the patient to take most medications that are necessary for treatment of other medical issues. The patient consumes alcohol on a daily basis or has recently stopped consuming alcohol. Regular alcohol use increases the risk of toxicity from acetaminophen. This analgesic should be avoided until the patient has been abstinent from alcohol for 6 months. Counseling for alcohol in patients with chronic liver disease  The patient was counseled regarding alcohol consumption and the effect of alcohol on chronic liver disease. The patient has a history of heavy alcohol abuse and he binge drink on alcohol.    It was recommended that all alcohol consumption be stopped and the patient be abstinent from all alcoholic beverages  If the patient cannot stop consuming alcohol then there is an alcohol abuse disorder and the patient should consider entering alcohol counseling and/or attend AA.    Vaccinations   The need for vaccination against viral hepatitis B is not needed  Vaccination for hepatitis A is recommended  Routine vaccinations against other bacterial and viral agents can be performed as indicated. Annual flu vaccination should be administered if indicated. ALLERGIES  No Known Allergies    MEDICATIONS  Current Outpatient Medications   Medication Sig    tiotropium (Spiriva with HandiHaler) 18 mcg inhalation capsule INHALE CONTENTS OF 1 CAPSULE ONCE DAILY USING HANDIHALER    gabapentin (NEURONTIN) 800 mg tablet TAKE 1 TABLET BY MOUTH THREE TIMES DAILY    Breo Ellipta 100-25 mcg/dose inhaler Take 1 Puff by inhalation daily.  amLODIPine (NORVASC) 10 mg tablet TAKE 1 TABLET BY MOUTH EVERY DAY    losartan (COZAAR) 100 mg tablet TAKE 1 TABLET BY MOUTH EVERY DAY    aspirin delayed-release 81 mg tablet TAKE 1 TABLET BY MOUTH EVERY DAY    HYDROcodone-acetaminophen (NORCO) 5-325 mg per tablet Take 1 Tab by mouth every six (6) hours as needed. (Patient not taking: Reported on 8/10/2021)    diclofenac EC (VOLTAREN) 75 mg EC tablet  (Patient not taking: Reported on 8/10/2021)    cyclobenzaprine (FLEXERIL) 10 mg tablet Take 10 mg by mouth three (3) times daily as needed. (Patient not taking: Reported on 8/10/2021)    naproxen (NAPROSYN) 500 mg tablet Take 1 Tab by mouth two (2) times daily (with meals). (Patient not taking: Reported on 8/10/2021)     No current facility-administered medications for this visit. SYSTEM REVIEW NOT RELATED TO LIVER DISEASE OR REVIEWED ABOVE:  Constitution systems: Negative for fever, chills, weight gain, weight loss. Eyes: Negative for visual changes. ENT: Negative for sore throat, painful swallowing. Respiratory: Negative for cough, hemoptysis, SOB. Cardiology: Negative for chest pain, palpitations. GI:  Negative for constipation or diarrhea. : Negative for urinary frequency, dysuria, hematuria, nocturia.    Skin: Negative for rash.  Hematology: Negative for easy bruising, blood clots. Musculo-skelatal: Negative for back pain, muscle pain, weakness. Neurologic: Negative for headaches, dizziness, vertigo, memory problems not related to HE. Psychology: Negative for anxiety, depression. FAMILY HISTORY:  The parents are   There is no family history of liver disease. There is no family history of immune disorders. SOCIAL HISTORY:  The patient is . The patient has 7  Children and many grandchildren  The patient currently smokes 3- 4 cigarettes daily  The patient has previously consumed alcohol in excess. He binge drink on alcohol often. The patient is currently on disability    PHYSICAL EXAMINATION:  Visit Vitals  Temp 98 °F (36.7 °C) (Temporal)   Resp 17   Ht 6' (1.829 m)   Wt 282 lb (127.9 kg)   BMI 38.25 kg/m²     General: No acute distress. Eyes: Sclera anicteric. ENT: No oral lesions. Thyroid normal.  Nodes: No adenopathy. Skin: No spider angiomata. No jaundice. No palmar erythema. Respiratory: Lungs clear to auscultation. Cardiovascular: Regular heart rate. No murmurs. No JVD. Abdomen: Soft non-tender. Liver size normal to percussion/palpation. Spleen not palpable. No obvious ascites. Extremities: No edema. No muscle wasting. No gross arthritic changes. Neurologic: Alert and oriented. Cranial nerves grossly intact. No asterixis. LABORATORY STUDIES:  Recent liver function panel, CBC with platelet count and BMP are not available. These studies will be performed.   Liver [place] of [address] Units 8/10/2021 3/31/2021   WBC 3.4 - 10.8 x10E3/uL  6.2   ANC 1.4 - 7.0 x10E3/uL  3.1   HGB 13.0 - 17.7 g/dL  15.8    - 450 x10E3/uL  249   AST 15 - 37 U/L 33 48 (H)   ALT 12 - 78 U/L 62 76 (H)   Alk Phos 45 - 117 U/L 72 67   Bili, Total 0.2 - 1.0 MG/DL 0.4 0.4   Bili, Direct 0.0 - 0.2 MG/DL     Albumin 3.5 - 5.0 g/dL 4.2 4.4   BUN 6 - 20 MG/DL 16 13   Creat 0.70 - 1.30 MG/DL 1.12 1.27   Na 136 - 145 mmol/L 138 138   K 3.5 - 5.1 mmol/L 4.4 4.2   Cl 97 - 108 mmol/L 105 102   CO2 21 - 32 mmol/L 28 21   Glucose 65 - 100 mg/dL 88 100 (H)     Liver [place] of [address] Units 11/24/2020   WBC 3.4 - 10.8 x10E3/uL    ANC 1.4 - 7.0 x10E3/uL    HGB 13.0 - 17.7 g/dL     - 450 x10E3/uL    AST 15 - 37 U/L 112 (H)   ALT 12 - 78 U/L 265 (H)   Alk Phos 45 - 117 U/L 92   Bili, Total 0.2 - 1.0 MG/DL 0.5   Bili, Direct 0.0 - 0.2 MG/DL 0.2   Albumin 3.5 - 5.0 g/dL 3.9   BUN 6 - 20 MG/DL    Creat 0.70 - 1.30 MG/DL    Na 136 - 145 mmol/L    K 3.5 - 5.1 mmol/L    Cl 97 - 108 mmol/L    CO2 21 - 32 mmol/L    Glucose 65 - 100 mg/dL      SEROLOGIES:  Not available or performed. Testing was performed today. Serologies Latest Ref Rng & Units 12/15/2020 11/24/2020   Hep A Ab, Total Negative Negative    Hep B Surface Ag Index  <0.10   Hep B Surface Ag Interp Negative    Negative   Hep B Core Ab, Total Negative Negative    Hep B Surface AB QL  Reactive    Hep C Ab NONREACTIVE    NONREACTIVE   Ferritin 30 - 400 ng/mL 259    Iron % Saturation 15 - 55 % 40    TIKA, IFA  Negative    ASMCA 0 - 19 Units 25 (H)    Alpha-1 antitrypsin level 101 - 187 mg/dL 138      LIVER HISTOLOGY:  08/10/2021: FibroScan performed at [address]. EkPa was 7.6,  IQR/med 45%.   The results suggested a fibrosis level of F1. The CAP score suggests there is hepatic steatosis. ENDOSCOPIC PROCEDURES:  Not available or performed    RADIOLOGY:  12/2020: Liver US: Liver demonstrate homogeneous echogenicity. The main portal vein is patent and demonstrates antegrade flow. Visualized hepatic veins are patent. OTHER TESTING:  Not available or performed    FOLLOW-UP:  All of the issues listed above in the Assessment and Plan were discussed with the patient. All questions were answered. The patient expressed a clear understanding of the above.     [address] [facility] 87 in 3 months for routine monitoring     Leyla Vega MD, MPH  [facility]  [address], [address] [place] [address] 22.  [phone]  [address] [place] [address]

## 2021-08-10 NOTE — LETTER
9/8/2021    Patient: Shana Colvin   YOB: 1955   Date of Visit: 8/10/2021     Henrietta Prado, [address] [facility] [address]  [address] [facility] [address]    Dear Henrietta Prado MD,      Thank you for referring Mr. Duard Boxer to [address] for evaluation. My notes for this consultation are attached. If you have questions, please do not hesitate to call me. I look forward to following your patient along with you.       Sincerely,    Vani Rodriguez MD

## 2021-09-28 RX ORDER — LOSARTAN POTASSIUM 100 MG/1
TABLET ORAL
Qty: 90 TABLET | Refills: 1 | Status: SHIPPED | OUTPATIENT
Start: 2021-09-28 | End: 2022-03-21

## 2021-10-11 DIAGNOSIS — J43.9 PULMONARY EMPHYSEMA, UNSPECIFIED EMPHYSEMA TYPE (HCC): ICD-10-CM

## 2021-10-11 RX ORDER — FLUTICASONE FUROATE AND VILANTEROL TRIFENATATE 100; 25 UG/1; UG/1
POWDER RESPIRATORY (INHALATION)
Qty: 60 EACH | Refills: 5 | Status: SHIPPED | OUTPATIENT
Start: 2021-10-11 | End: 2022-04-18

## 2021-10-14 RX ORDER — AMLODIPINE BESYLATE 10 MG/1
TABLET ORAL
Qty: 90 TABLET | Refills: 1 | Status: SHIPPED | OUTPATIENT
Start: 2021-10-14 | End: 2022-04-11

## 2021-11-04 ENCOUNTER — VIRTUAL VISIT (OUTPATIENT)
Dept: FAMILY MEDICINE CLINIC | Age: 66
End: 2021-11-04
Payer: MEDICARE

## 2021-11-04 DIAGNOSIS — M25.522 LEFT ELBOW PAIN: Primary | ICD-10-CM

## 2021-11-04 DIAGNOSIS — I10 ESSENTIAL HYPERTENSION: ICD-10-CM

## 2021-11-04 PROCEDURE — G8427 DOCREV CUR MEDS BY ELIG CLIN: HCPCS | Performed by: FAMILY MEDICINE

## 2021-11-04 PROCEDURE — 99213 OFFICE O/P EST LOW 20 MIN: CPT | Performed by: FAMILY MEDICINE

## 2021-11-04 PROCEDURE — 3017F COLORECTAL CA SCREEN DOC REV: CPT | Performed by: FAMILY MEDICINE

## 2021-11-04 PROCEDURE — G8510 SCR DEP NEG, NO PLAN REQD: HCPCS | Performed by: FAMILY MEDICINE

## 2021-11-04 PROCEDURE — G8756 NO BP MEASURE DOC: HCPCS | Performed by: FAMILY MEDICINE

## 2021-11-04 PROCEDURE — 1101F PT FALLS ASSESS-DOCD LE1/YR: CPT | Performed by: FAMILY MEDICINE

## 2021-11-04 RX ORDER — DICLOFENAC SODIUM 75 MG/1
75 TABLET, DELAYED RELEASE ORAL 2 TIMES DAILY
Qty: 60 TABLET | Refills: 2 | Status: SHIPPED | OUTPATIENT
Start: 2021-11-04 | End: 2021-11-18

## 2021-11-04 NOTE — PROGRESS NOTES
Consent: Patrice Golden, who was seen by synchronous (real-time) audio-video technology, and/or his healthcare decision maker, is aware that this patient-initiated, Telehealth encounter on 11/4/2021 is a billable service, with coverage as determined by his insurance carrier. He is aware that he may receive a bill and has provided verbal consent to proceed: YES-Consent obtained within past 12 months  712    Prior to Admission medications    Medication Sig Start Date End Date Taking? Authorizing Provider   diclofenac EC (VOLTAREN) 75 mg EC tablet Take 1 Tablet by mouth two (2) times a day for 14 days. 11/4/21 11/18/21 Yes Mayra Ibarra MD   tiotropium (Spiriva with HandiHaler) 18 mcg inhalation capsule INHALE CONTENTS OF 1 CAPSULE ONCE DAILY USING HANDIHALER 11/4/21  Yes Mayra Ibarra MD   amLODIPine (NORVASC) 10 mg tablet TAKE 1 TABLET BY MOUTH EVERY DAY 10/14/21   Mayra Ibarra MD   Breo Ellipta 100-25 mcg/dose inhaler INHALE 1 PUFF BY MOUTH DAILY 10/11/21   Mayra Ibarra MD   losartan (COZAAR) 100 mg tablet TAKE 1 TABLET BY MOUTH EVERY DAY 9/28/21   Mayra Ibarra MD   gabapentin (NEURONTIN) 800 mg tablet TAKE 1 TABLET BY MOUTH THREE TIMES DAILY 5/17/21   Mayra Ibarra MD   tiotropium (Spiriva with HandiHaler) 18 mcg inhalation capsule INHALE CONTENTS OF 1 CAPSULE ONCE DAILY USING HANDIHALER 5/17/21 11/4/21  Mayra Ibarra MD   HYDROcodone-acetaminophen (NORCO) 5-325 mg per tablet Take 1 Tab by mouth every six (6) hours as needed. Patient not taking: Reported on 8/10/2021 4/10/20   Provider, Historical   diclofenac EC (VOLTAREN) 75 mg EC tablet  11/6/20   Provider, Historical   cyclobenzaprine (FLEXERIL) 10 mg tablet Take 10 mg by mouth three (3) times daily as needed.   Patient not taking: Reported on 8/10/2021 3/6/20   Provider, Historical   aspirin delayed-release 81 mg tablet TAKE 1 TABLET BY MOUTH EVERY DAY 3/9/20   Mayra Ibarra MD   naproxen (NAPROSYN) 500 mg tablet Take 1 Tab by mouth two (2) times daily (with meals). Patient not taking: Reported on 8/10/2021 12/16/19 11/4/21  Devin Torres MD     No Known Allergies        Assessment & Plan:   Diagnoses and all orders for this visit:    1. Left elbow pain  -     diclofenac EC (VOLTAREN) 75 mg EC tablet; Take 1 Tablet by mouth two (2) times a day for 14 days. Patient has left elbow pain and swelling for the past month. Denies any trauma to the area. Add medicine as above return to office if not better. 2. Essential hypertension  At goal at home  Other orders  -     tiotropium (Spiriva with HandiHaler) 18 mcg inhalation capsule; INHALE CONTENTS OF 1 CAPSULE ONCE DAILY USING HANDIHALER        Medication Side Effects and Warnings were discussed with patient  Patient Labs were reviewed and or requested:  Patient Past Records were reviewed and or requested              We discussed the expected course, resolution and complications of the diagnosis(es) in detail. Medication risks, benefits, costs, interactions, and alternatives were discussed as indicated. I advised him to contact the office if his condition worsens, changes or fails to improve as anticipated. He expressed understanding with the diagnosis(es) and plan. Cha Goetz is a 77 y.o. male being evaluated by a video visit encounter for concerns as above. A caregiver was present when appropriate. Due to this being a TeleHealth encounter (During [place] [address] public [facility] emergency), evaluation of the following organ systems was limited: Vitals/Constitutional/EENT/Resp/CV/GI//MS/Neuro/Skin/Heme-Lymph-Imm. Pursuant to the emergency declaration under the [facility], [address] waiver authority and the Skin [employer] and [employer] Act, this Virtual  Visit was conducted, with patient's (and/or legal guardian's) consent, to reduce the patient's risk of exposure to COVID-19 and provide necessary medical care. Services were provided through a video synchronous discussion virtually to substitute for in-person clinic visit. Patient and provider were located at their individual homes. I have discussed the diagnosis with the patient and the intended plan as seen in the above orders. The patient understands and agrees with the plan. The patient has received an after-visit summary and questions were answered concerning future plans. Medication Side Effects and Warnings were discussed with patient  Patient Labs were reviewed and or requested:  Patient Past Records were reviewed and or requested    Megan Tobar M.D. There are no Patient Instructions on file for this visit.

## 2021-11-21 ENCOUNTER — HOSPITAL ENCOUNTER (EMERGENCY)
Age: 66
Discharge: HOME | End: 2021-11-21
Attending: EMERGENCY MEDICINE
Payer: MEDICARE

## 2021-11-21 VITALS
HEIGHT: 72 IN | RESPIRATION RATE: 18 BRPM | HEART RATE: 77 BPM | OXYGEN SATURATION: 98 % | SYSTOLIC BLOOD PRESSURE: 135 MMHG | WEIGHT: 292 LBS | BODY MASS INDEX: 39.55 KG/M2 | DIASTOLIC BLOOD PRESSURE: 81 MMHG | TEMPERATURE: 98.4 F

## 2021-11-21 DIAGNOSIS — L03.011 PARONYCHIA OF RIGHT THUMB: Primary | ICD-10-CM

## 2021-11-21 PROCEDURE — 75810000289 HC I&D ABSCESS SIMP/COMP/MULT

## 2021-11-21 PROCEDURE — 74011000250 HC RX REV CODE- 250: Performed by: EMERGENCY MEDICINE

## 2021-11-21 PROCEDURE — 99282 EMERGENCY DEPT VISIT SF MDM: CPT

## 2021-11-21 RX ORDER — DOXYCYCLINE HYCLATE 100 MG
100 TABLET ORAL 2 TIMES DAILY
Qty: 20 TABLET | Refills: 0 | Status: SHIPPED | OUTPATIENT
Start: 2021-11-21 | End: 2021-12-01

## 2021-11-21 RX ORDER — LIDOCAINE HYDROCHLORIDE 10 MG/ML
3 INJECTION INFILTRATION; PERINEURAL ONCE
Status: COMPLETED | OUTPATIENT
Start: 2021-11-21 | End: 2021-11-21

## 2021-11-21 RX ADMIN — LIDOCAINE HYDROCHLORIDE 3 ML: 10 INJECTION, SOLUTION INFILTRATION; PERINEURAL at 13:56

## 2021-11-21 NOTE — ED TRIAGE NOTES
Pt states he was bit by a spider Friday night on right thumb; woke up Saturday morning with pain in his hand, he did not see the actual spider bite him

## 2021-11-21 NOTE — ED PROVIDER NOTES
EMERGENCY DEPARTMENT HISTORY AND PHYSICAL EXAM      Date: 11/21/2021  Patient Name: Ulices Nguyen    History of Presenting Illness     Chief Complaint   Patient presents with    Insect Bite       History Provided By: Patient and Patient's Wife    HPI: Ulices Nguyen, 77 y.o. male with a past medical history significant COPD and HELGA, HTN presents to the ED with cc of possible insect bite to the thumb. Two days ago patient developed pain in his thumb around the soft tissue around the nail. It has been getting more painful and swollen. He saw a spider in his house and killed it. He did not see it bite him but is concerned that a spider bite could be causing the pain/swelling of this thumb. No systemic symptoms other than rigors. There are no other complaints, changes, or physical findings at this time. PCP: Nick Rodgers MD    No current facility-administered medications on file prior to encounter. Current Outpatient Medications on File Prior to Encounter   Medication Sig Dispense Refill    tiotropium (Spiriva with HandiHaler) 18 mcg inhalation capsule INHALE CONTENTS OF 1 CAPSULE ONCE DAILY USING HANDIHALER 30 Capsule 5    amLODIPine (NORVASC) 10 mg tablet TAKE 1 TABLET BY MOUTH EVERY DAY 90 Tablet 1    Breo Ellipta 100-25 mcg/dose inhaler INHALE 1 PUFF BY MOUTH DAILY 60 Each 5    losartan (COZAAR) 100 mg tablet TAKE 1 TABLET BY MOUTH EVERY DAY 90 Tablet 1    gabapentin (NEURONTIN) 800 mg tablet TAKE 1 TABLET BY MOUTH THREE TIMES DAILY 270 Tab 1    HYDROcodone-acetaminophen (NORCO) 5-325 mg per tablet Take 1 Tab by mouth every six (6) hours as needed. (Patient not taking: Reported on 8/10/2021)      diclofenac EC (VOLTAREN) 75 mg EC tablet  (Patient not taking: Reported on 8/10/2021)      cyclobenzaprine (FLEXERIL) 10 mg tablet Take 10 mg by mouth three (3) times daily as needed.  (Patient not taking: Reported on 8/10/2021)      aspirin delayed-release 81 mg tablet TAKE 1 TABLET BY MOUTH EVERY DAY 80 Tab 5       Past History     Past Medical History:  Past Medical History:   Diagnosis Date    Chronic obstructive pulmonary disease ([facility] [address]     Hypertension     HELGA (obstructive sleep apnea) 12/16/2019       Past Surgical History:  History reviewed. No pertinent surgical history. Family History:  Family History   Problem Relation Age of Onset    Hypertension Mother        Social History:  Social History     Tobacco Use    Smoking status: Current Every Day Smoker     Packs/day: 0.25    Smokeless tobacco: Never Used    Tobacco comment: 3 cigarettes daily   Vaping Use    Vaping Use: Never used   Substance Use Topics    Alcohol use: Not Currently     Alcohol/week: 6.0 standard drinks     Types: 6 Cans of beer per week     Comment: rare    Drug use: Never       Allergies:  No Known Allergies      Review of Systems     Review of Systems   Constitutional: Positive for chills. Negative for activity change, appetite change and fever. HENT: Negative for rhinorrhea and sore throat. Eyes: Negative for visual disturbance. Respiratory: Negative for cough and shortness of breath. Cardiovascular: Negative for chest pain. Gastrointestinal: Negative for abdominal pain, diarrhea, nausea and vomiting. Genitourinary: Negative for dysuria. Musculoskeletal: Negative for arthralgias and myalgias. Thumb pain   Skin: Negative for rash. Neurological: Negative for headaches. Psychiatric/Behavioral: Negative for confusion. All other systems reviewed and are negative. Physical Exam     Physical Exam  Vitals and nursing note reviewed. Constitutional:       General: He is not in acute distress. Appearance: Normal appearance. HENT:      Head: Normocephalic and atraumatic. Eyes:      Pupils: Pupils are equal, round, and reactive to light. Cardiovascular:      Rate and Rhythm: Normal rate and regular rhythm. Pulses: Normal pulses.    Pulmonary:      Effort: Pulmonary effort is normal.   Musculoskeletal:         General: No swelling or deformity. Comments: R hand neurovascularly intact. Swelling & TTP around R thumbnail. Thumb has full ROM   Skin:     Coloration: Skin is not pale. Findings: No rash. Neurological:      General: No focal deficit present. Mental Status: He is alert. Psychiatric:         Mood and Affect: Mood normal.         Behavior: Behavior normal.         Lab and Diagnostic Study Results     Labs -   No results found for this or any previous visit (from the past 12 hour(s)). Radiologic Studies -   @lastxrresult@  CT Results  (Last 48 hours)    None        CXR Results  (Last 48 hours)    None            Medical Decision Making   - I am the first provider for this patient. - I reviewed the vital signs, available nursing notes, past medical history, past surgical history, family history and social history. - Initial assessment performed. The patients presenting problems have been discussed, and they are in agreement with the care plan formulated and outlined with them. I have encouraged them to ask questions as they arise throughout their visit. Vital Signs-Reviewed the patient's vital signs. No data found. Records Reviewed: Nursing Notes          ED Course:     ED Course as of 11/23/21 1422   Sun Nov 21, 2021   1353 63 yo male presents for evaluation of thumb pain. Is concerned the spider may have bit him in his sleep. On exam patient has a paronychia. Will require I&D as there is obvious abscess. Patient is complaining of some systemic rigors will treat with antibiotics. Anticipate discharge home after treatment. [LW]   1805 Paronychia I&Ded with 1-2 cc of pus drained. Pained improved. Patient discharged w doxy and hand follow up as needed.  [LW]      ED Course User Index  [LW] Elma Limon MD         Procedures   Medical Decision Makingedical Decision Making  Performed by: Kim Lux MD  PROCEDURES:  I&D Abcess Simple    Date/Time: 11/23/2021 2:22 PM  Performed by: Sanjeev Germain MD  Authorized by: Sanjeev Germain MD     Consent:     Consent obtained:  Verbal    Consent given by:  Patient    Risks discussed:  Incomplete drainage, pain, infection, damage to other organs and bleeding    Alternatives discussed:  No treatment  Location:     Indications for incision and drainage: Paronychia. Location:  Upper extremity    Upper extremity location:  Hand    Hand location:  R hand  Pre-procedure details:     Skin preparation:  Chloraprep  Anesthesia (see MAR for exact dosages): Anesthesia method:  Local infiltration    Local anesthetic:  Lidocaine 1% w/o epi  Procedure type:     Complexity:  Simple  Procedure details:     Needle aspiration: yes      Needle size:  20 G    Drainage:  Purulent    Drainage amount: Moderate    Packing materials:  None  Post-procedure details:     Patient tolerance of procedure: Tolerated well, no immediate complications           Disposition   Disposition: DC- Adult Discharges: All of the diagnostic tests were reviewed and questions answered. Diagnosis, care plan and treatment options were discussed. The patient understands the instructions and will follow up as directed. The patients results have been reviewed with them. They have been counseled regarding their diagnosis. The patient verbally convey understanding and agreement of the signs, symptoms, diagnosis, treatment and prognosis and additionally agrees to follow up as recommended with their PCP in 24 - 48 hours. They also agree with the care-plan and convey that all of their questions have been answered.   I have also put together some discharge instructions for them that include: 1) educational information regarding their diagnosis, 2) how to care for their diagnosis at home, as well a 3) list of reasons why they would want to return to the ED prior to their follow-up appointment, should their condition change. Discharged    DISCHARGE PLAN:  1. Current Discharge Medication List      START taking these medications    Details   doxycycline (VIBRA-TABS) 100 mg tablet Take 1 Tablet by mouth two (2) times a day for 10 days. Qty: 20 Tablet, Refills: 0         CONTINUE these medications which have NOT CHANGED    Details   tiotropium (Spiriva with HandiHaler) 18 mcg inhalation capsule INHALE CONTENTS OF 1 CAPSULE ONCE DAILY USING HANDIHALER  Qty: 30 Capsule, Refills: 5      amLODIPine (NORVASC) 10 mg tablet TAKE 1 TABLET BY MOUTH EVERY DAY  Qty: 90 Tablet, Refills: 1      Breo Ellipta 100-25 mcg/dose inhaler INHALE 1 PUFF BY MOUTH DAILY  Qty: 60 Each, Refills: 5    Associated Diagnoses: Pulmonary emphysema, unspecified emphysema type (HCC)      losartan (COZAAR) 100 mg tablet TAKE 1 TABLET BY MOUTH EVERY DAY  Qty: 90 Tablet, Refills: 1      gabapentin (NEURONTIN) 800 mg tablet TAKE 1 TABLET BY MOUTH THREE TIMES DAILY  Qty: 270 Tab, Refills: 1    Associated Diagnoses: DDD (degenerative disc disease), cervical      HYDROcodone-acetaminophen (NORCO) 5-325 mg per tablet Take 1 Tab by mouth every six (6) hours as needed. diclofenac EC (VOLTAREN) 75 mg EC tablet       cyclobenzaprine (FLEXERIL) 10 mg tablet Take 10 mg by mouth three (3) times daily as needed. aspirin delayed-release 81 mg tablet TAKE 1 TABLET BY MOUTH EVERY DAY  Qty: 90 Tab, Refills: 5           2. Follow-up Information     Follow up With Specialties Details Why Irena Calixto MD Orthopedic Surgery In 1 week  [address] [facility] [address]  [phone]          3. Return to ED if worse   4. Discharge Medication List as of 11/21/2021  2:22 PM      START taking these medications    Details   doxycycline (VIBRA-TABS) 100 mg tablet Take 1 Tablet by mouth two (2) times a day for 10 days. , Normal, Disp-20 Tablet, R-0         CONTINUE these medications which have NOT CHANGED    Details tiotropium (Spiriva with HandiHaler) 18 mcg inhalation capsule INHALE CONTENTS OF 1 CAPSULE ONCE DAILY USING HANDIHALER, Normal, Disp-30 Capsule, R-5      amLODIPine (NORVASC) 10 mg tablet TAKE 1 TABLET BY MOUTH EVERY DAY, Normal, Disp-90 Tablet, R-1      Breo Ellipta 100-25 mcg/dose inhaler INHALE 1 PUFF BY MOUTH DAILY, Normal, Disp-60 Each, R-5, STEPHIE      losartan (COZAAR) 100 mg tablet TAKE 1 TABLET BY MOUTH EVERY DAY, Normal, Disp-90 Tablet, R-1      gabapentin (NEURONTIN) 800 mg tablet TAKE 1 TABLET BY MOUTH THREE TIMES DAILY, Normal, Disp-270 Tab, R-1      HYDROcodone-acetaminophen (NORCO) 5-325 mg per tablet Take 1 Tab by mouth every six (6) hours as needed., Historical Med      diclofenac EC (VOLTAREN) 75 mg EC tablet Historical Med      cyclobenzaprine (FLEXERIL) 10 mg tablet Take 10 mg by mouth three (3) times daily as needed., Historical Med      aspirin delayed-release 81 mg tablet TAKE 1 TABLET BY MOUTH EVERY DAY, Normal, Disp-90 Tab,R-5               Diagnosis     Clinical Impression:   1. Paronychia of right thumb        Attestations:    Rico Santiago MD    Please note that this dictation was completed with [employer], the [employer] voice recognition software. Quite often unanticipated grammatical, syntax, homophones, and other interpretive errors are inadvertently transcribed by the computer software. Please disregard these errors. Please excuse any errors that have escaped final proofreading. Thank you.

## 2021-12-03 DIAGNOSIS — M50.30 DDD (DEGENERATIVE DISC DISEASE), CERVICAL: ICD-10-CM

## 2021-12-06 ENCOUNTER — TELEPHONE (OUTPATIENT)
Dept: FAMILY MEDICINE CLINIC | Age: 66
End: 2021-12-06

## 2021-12-06 NOTE — TELEPHONE ENCOUNTER
Attempted to return call to patient to obtain more details. No answer and LVM.     Patient is requesting Ortho referral as discussed at [address] done: 11/4/21

## 2021-12-06 NOTE — TELEPHONE ENCOUNTER
Patient stated the knot on his left elbow has not gotten any better since his VV. He stated provider was going to give him a referral if not any better.  Please call patient: [phone]

## 2021-12-07 RX ORDER — GABAPENTIN 800 MG/1
TABLET ORAL
Qty: 270 TABLET | Refills: 1 | Status: SHIPPED | OUTPATIENT
Start: 2021-12-07 | End: 2022-07-11

## 2022-01-27 RX ORDER — DICLOFENAC SODIUM 75 MG/1
TABLET, DELAYED RELEASE ORAL
Qty: 60 TABLET | Refills: 2 | Status: ON HOLD | OUTPATIENT
Start: 2022-01-27

## 2022-03-07 ENCOUNTER — NURSE TRIAGE (OUTPATIENT)
Dept: OTHER | Facility: CLINIC | Age: 67
End: 2022-03-07

## 2022-03-18 PROBLEM — J43.9 PULMONARY EMPHYSEMA (HCC): Status: ACTIVE | Noted: 2019-12-16

## 2022-03-18 PROBLEM — I73.9 PERIPHERAL VASCULAR DISEASE (HCC): Status: ACTIVE | Noted: 2019-12-16

## 2022-03-19 PROBLEM — I10 ESSENTIAL HYPERTENSION: Status: ACTIVE | Noted: 2019-12-16

## 2022-03-19 PROBLEM — R74.8 ABNORMAL LIVER ENZYMES: Status: ACTIVE | Noted: 2021-01-03

## 2022-03-19 PROBLEM — G47.33 OSA (OBSTRUCTIVE SLEEP APNEA): Status: ACTIVE | Noted: 2019-12-16

## 2022-03-19 PROBLEM — E66.01 SEVERE OBESITY (HCC): Status: ACTIVE | Noted: 2019-12-16

## 2022-03-19 PROBLEM — M50.30 DDD (DEGENERATIVE DISC DISEASE), CERVICAL: Status: ACTIVE | Noted: 2019-12-16

## 2022-03-21 RX ORDER — LOSARTAN POTASSIUM 100 MG/1
TABLET ORAL
Qty: 90 TABLET | Refills: 1 | Status: SHIPPED | OUTPATIENT
Start: 2022-03-21 | End: 2022-09-21

## 2022-04-11 RX ORDER — AMLODIPINE BESYLATE 10 MG/1
TABLET ORAL
Qty: 90 TABLET | Refills: 1 | Status: SHIPPED | OUTPATIENT
Start: 2022-04-11 | End: 2022-10-17

## 2022-04-17 DIAGNOSIS — J43.9 PULMONARY EMPHYSEMA, UNSPECIFIED EMPHYSEMA TYPE (HCC): ICD-10-CM

## 2022-04-18 RX ORDER — FLUTICASONE FUROATE AND VILANTEROL TRIFENATATE 100; 25 UG/1; UG/1
POWDER RESPIRATORY (INHALATION)
Qty: 60 EACH | Refills: 5 | Status: SHIPPED | OUTPATIENT
Start: 2022-04-18 | End: 2022-10-17

## 2022-05-11 ENCOUNTER — TELEPHONE (OUTPATIENT)
Dept: FAMILY MEDICINE CLINIC | Age: 67
End: 2022-05-11

## 2022-05-11 NOTE — TELEPHONE ENCOUNTER
----- Message from Silas Suero sent at 5/11/2022  3:30 PM EDT -----  Subject: Appointment Request    Reason for Call: Routine (Patient Request) No Script    QUESTIONS  Type of Appointment? Established Patient  Reason for appointment request? No appointments available during search  Additional Information for Provider? patient called stating he has an   ammonia taste in house mouth and would like to know what to do there are   no appts  ---------------------------------------------------------------------------  --------------  CALL BACK INFO  What is the best way for the office to contact you? OK to leave message on   voicemail  Preferred Call Back Phone Number? [phone]  ---------------------------------------------------------------------------  --------------  SCRIPT ANSWERS  Relationship to Patient? Self  (Is the patient requesting to see the provider for a procedure?)? No  (Is the patient requesting to see the provider urgently  today or   tomorrow. )? No  Have you been diagnosed with, awaiting test results for, or told that you   are suspected of having COVID-19 (Coronavirus)? (If patient has tested   negative or was tested as a requirement for work, school, or travel and   not based on symptoms, answer no)? No  Within the past 10 days have you developed any of the following symptoms   (answer no if symptoms have been present longer than 10 days or began   more than 10 days ago)? Fever or Chills, Cough, Shortness of breath or   difficulty breathing, Loss of taste or smell, Sore throat, Nasal   congestion, Sneezing or runny nose, Fatigue or generalized body aches   (answer no if pain is specific to a body part e.g. back pain), Diarrhea,   Headache? No  Have you had close contact with someone with COVID-19 in the last 7 days? No  (Service Expert  click yes below to proceed with Responde Ai As Usual   Scheduling)?  Yes

## 2022-05-11 NOTE — TELEPHONE ENCOUNTER
Pt calling back stating the ammonia taste and it  makes his eyes water this happens all the time it has been a week now that this has been going on please advise  [phone]

## 2022-05-25 ENCOUNTER — OFFICE VISIT (OUTPATIENT)
Dept: FAMILY MEDICINE CLINIC | Age: 67
End: 2022-05-25

## 2022-05-25 VITALS
DIASTOLIC BLOOD PRESSURE: 84 MMHG | BODY MASS INDEX: 38.87 KG/M2 | HEIGHT: 72 IN | HEART RATE: 72 BPM | RESPIRATION RATE: 18 BRPM | OXYGEN SATURATION: 99 % | SYSTOLIC BLOOD PRESSURE: 139 MMHG | WEIGHT: 287 LBS | TEMPERATURE: 99.3 F

## 2022-05-25 DIAGNOSIS — I10 ESSENTIAL HYPERTENSION: ICD-10-CM

## 2022-05-25 DIAGNOSIS — Z00.01 ENCOUNTER FOR ROUTINE ADULT HEALTH EXAMINATION WITH ABNORMAL FINDINGS: Primary | ICD-10-CM

## 2022-05-25 DIAGNOSIS — J43.9 PULMONARY EMPHYSEMA, UNSPECIFIED EMPHYSEMA TYPE (HCC): ICD-10-CM

## 2022-05-25 DIAGNOSIS — R73.9 ELEVATED BLOOD SUGAR: ICD-10-CM

## 2022-05-25 DIAGNOSIS — R74.8 ABNORMAL LIVER ENZYMES: ICD-10-CM

## 2022-05-25 DIAGNOSIS — I73.9 PERIPHERAL VASCULAR DISEASE (HCC): ICD-10-CM

## 2022-05-25 DIAGNOSIS — E66.01 SEVERE OBESITY (BMI 35.0-39.9) WITH COMORBIDITY (HCC): ICD-10-CM

## 2022-05-25 PROCEDURE — G8752 SYS BP LESS 140: HCPCS | Performed by: FAMILY MEDICINE

## 2022-05-25 PROCEDURE — G8510 SCR DEP NEG, NO PLAN REQD: HCPCS | Performed by: FAMILY MEDICINE

## 2022-05-25 PROCEDURE — 99214 OFFICE O/P EST MOD 30 MIN: CPT | Performed by: FAMILY MEDICINE

## 2022-05-25 PROCEDURE — G8427 DOCREV CUR MEDS BY ELIG CLIN: HCPCS | Performed by: FAMILY MEDICINE

## 2022-05-25 PROCEDURE — 1101F PT FALLS ASSESS-DOCD LE1/YR: CPT | Performed by: FAMILY MEDICINE

## 2022-05-25 PROCEDURE — G8536 NO DOC ELDER MAL SCRN: HCPCS | Performed by: FAMILY MEDICINE

## 2022-05-25 PROCEDURE — G0438 PPPS, INITIAL VISIT: HCPCS | Performed by: FAMILY MEDICINE

## 2022-05-25 PROCEDURE — 1123F ACP DISCUSS/DSCN MKR DOCD: CPT | Performed by: FAMILY MEDICINE

## 2022-05-25 PROCEDURE — G8417 CALC BMI ABV UP PARAM F/U: HCPCS | Performed by: FAMILY MEDICINE

## 2022-05-25 PROCEDURE — 3017F COLORECTAL CA SCREEN DOC REV: CPT | Performed by: FAMILY MEDICINE

## 2022-05-25 PROCEDURE — G8754 DIAS BP LESS 90: HCPCS | Performed by: FAMILY MEDICINE

## 2022-05-25 NOTE — PROGRESS NOTES
Medicare Wellness Exam:    Chief Complaint   Patient presents with    Physical     Amonia taste and smell    Hypertension    Labs     he is a 77y.o. year old male who presents for evaluation for their Medicare Wellness Visit. Naun Jensen is completed and assessed=yes  Depression Screen is completed and assessed=yes  Medication list reviewed and adjusted for accuracy=yes  Immunizations reviewed and updated=yes  Health/Preventative Screenings reviewed and updated=yes  ADL Functions reviewed=yes    Patient Active Problem List    Diagnosis    Abnormal liver enzymes    Severe obesity ([facility] [address]    Peripheral vascular disease ([facility] [address]    Pulmonary emphysema ([facility] [address]    DDD (degenerative disc disease), cervical    Essential hypertension    HELGA (obstructive sleep apnea)       Reviewed PmHx, RxHx, FmHx, SocHx, AllgHx and updated and dated in the chart. Review of Systems - negative except as listed above in the HPI    Objective:     Vitals:    05/25/22 1506   BP: 139/84   Pulse: 72   Resp: 18   Temp: 99.3 °F (37.4 °C)   SpO2: 99%   Weight: 287 lb (130.2 kg)   Height: 6' (1.829 m)       Assessment/ Plan:   Diagnoses and all orders for this visit:    1. Encounter for routine adult health examination with abnormal findings  -     LIPID PANEL; Future  -     METABOLIC PANEL, COMPREHENSIVE; Future  -     CBC WITH AUTOMATED DIFF; Future  -     TSH 3RD GENERATION; Future  -     HEMOGLOBIN A1C WITH EAG; Future  -     PSA W/ REFLX FREE PSA; Future  -smell changes more than likely due to Covid few weeks back    2. Severe obesity (BMI 35.0-39. 9) with comorbidity ([facility] [address]  -dwp diet     3. Pulmonary emphysema, unspecified emphysema type ([facility] [address]  -stable    4. Peripheral vascular disease (HCC)  -cont with ASA    5. Abnormal liver enzymes  -     METABOLIC PANEL, COMPREHENSIVE; Future    6. Essential hypertension  -     LIPID PANEL; Future  -     METABOLIC PANEL, COMPREHENSIVE; Future  -at goal    7.  Elevated blood sugar  -     METABOLIC PANEL, COMPREHENSIVE; Future  -     HEMOGLOBIN A1C WITH EAG; Future         -Pain evaluation performed in office  -Cognitive Screen performed in office  -Depression Screen, Fall risks (by up and go test)  and ADL functionality were addressed  -Medication list updated and reviewed for any changes   -A comprehensive review of medical issues and a plan was formulated  -End of life planning was addressed with pt   -Health Screenings for preventions were addressed and a plan was formulated  -Shingles Vaccine was recommended  -Discussed with patient cancer risk factors and appropriate screenings for age  -Patient evaluated for colonoscopy and referred if needed per screeing criteria  -Labs from previous visits were discussed with patient   -Discussed with patient diet and exercise and formulated a plan as needed  -An Advanced care plan was developed with the patient.  -Alcohol screening performed and was negative    -    I have discussed the diagnosis with the patient and the intended plan as seen in the above orders. The patient understands and agrees with the plan. The patient has received an after-visit summary and questions were answered concerning future plans. Medication Side Effects and Warnings were discussed with patien  Patient Labs were reviewed and or requested  Patient Past Records were reviewed and or requested    There are no Patient Instructions on file for this visit.       Wu Melgar M.D.

## 2022-05-25 NOTE — PROGRESS NOTES
Chief Complaint   Patient presents with    Physical     Amonia taste and smell    Hypertension    Labs     1. Have you been to the ER, urgent care clinic since your last visit? Hospitalized since your last visit? No    2. Have you seen or consulted any other health care providers outside of the [address] [place] [address] since your last visit? Include any pap smears or colon screening.  Yes Where: Ortho: Left arm

## 2022-05-26 LAB
ALBUMIN SERPL-MCNC: 3.7 G/DL (ref 3.5–5)
ALBUMIN/GLOB SERPL: 0.9 {RATIO} (ref 1.1–2.2)
ALP SERPL-CCNC: 85 U/L (ref 45–117)
ALT SERPL-CCNC: 32 U/L (ref 12–78)
ANION GAP SERPL CALC-SCNC: 8 MMOL/L (ref 5–15)
AST SERPL-CCNC: 28 U/L (ref 15–37)
BASOPHILS # BLD: 0.1 K/UL (ref 0–0.1)
BASOPHILS NFR BLD: 1 % (ref 0–1)
BILIRUB SERPL-MCNC: 0.4 MG/DL (ref 0.2–1)
BUN SERPL-MCNC: 15 MG/DL (ref 6–20)
BUN/CREAT SERPL: 13 (ref 12–20)
CALCIUM SERPL-MCNC: 8.8 MG/DL (ref 8.5–10.1)
CHLORIDE SERPL-SCNC: 104 MMOL/L (ref 97–108)
CHOLEST SERPL-MCNC: 166 MG/DL
CO2 SERPL-SCNC: 24 MMOL/L (ref 21–32)
CREAT SERPL-MCNC: 1.13 MG/DL (ref 0.7–1.3)
DIFFERENTIAL METHOD BLD: ABNORMAL
EOSINOPHIL # BLD: 0.1 K/UL (ref 0–0.4)
EOSINOPHIL NFR BLD: 1 % (ref 0–7)
ERYTHROCYTE [DISTWIDTH] IN BLOOD BY AUTOMATED COUNT: 15.3 % (ref 11.5–14.5)
EST. AVERAGE GLUCOSE BLD GHB EST-MCNC: 117 MG/DL
GLOBULIN SER CALC-MCNC: 4.1 G/DL (ref 2–4)
GLUCOSE SERPL-MCNC: 98 MG/DL (ref 65–100)
HBA1C MFR BLD: 5.7 % (ref 4–5.6)
HCT VFR BLD AUTO: 47.8 % (ref 36.6–50.3)
HDLC SERPL-MCNC: 44 MG/DL
HDLC SERPL: 3.8 {RATIO} (ref 0–5)
HGB BLD-MCNC: 15.7 G/DL (ref 12.1–17)
IMM GRANULOCYTES # BLD AUTO: 0 K/UL (ref 0–0.04)
IMM GRANULOCYTES NFR BLD AUTO: 0 % (ref 0–0.5)
LDLC SERPL CALC-MCNC: 91 MG/DL (ref 0–100)
LYMPHOCYTES # BLD: 2.6 K/UL (ref 0.8–3.5)
LYMPHOCYTES NFR BLD: 36 % (ref 12–49)
MCH RBC QN AUTO: 30.3 PG (ref 26–34)
MCHC RBC AUTO-ENTMCNC: 32.8 G/DL (ref 30–36.5)
MCV RBC AUTO: 92.1 FL (ref 80–99)
MONOCYTES # BLD: 0.7 K/UL (ref 0–1)
MONOCYTES NFR BLD: 10 % (ref 5–13)
NEUTS SEG # BLD: 3.8 K/UL (ref 1.8–8)
NEUTS SEG NFR BLD: 52 % (ref 32–75)
NRBC # BLD: 0 K/UL (ref 0–0.01)
NRBC BLD-RTO: 0 PER 100 WBC
PLATELET # BLD AUTO: 266 K/UL (ref 150–400)
PMV BLD AUTO: 10 FL (ref 8.9–12.9)
POTASSIUM SERPL-SCNC: 3.9 MMOL/L (ref 3.5–5.1)
PROT SERPL-MCNC: 7.8 G/DL (ref 6.4–8.2)
RBC # BLD AUTO: 5.19 M/UL (ref 4.1–5.7)
SODIUM SERPL-SCNC: 136 MMOL/L (ref 136–145)
TRIGL SERPL-MCNC: 155 MG/DL (ref ?–150)
TSH SERPL DL<=0.05 MIU/L-ACNC: 1.59 UIU/ML (ref 0.36–3.74)
VLDLC SERPL CALC-MCNC: 31 MG/DL
WBC # BLD AUTO: 7.3 K/UL (ref 4.1–11.1)

## 2022-05-26 NOTE — PROGRESS NOTES
Patient's labs are good and stable. No changes to medical regimen. Please encourage patient (if appropriate) to sign up for Mychart and text them the link if needed.     Dr. Petty Paz

## 2022-05-27 LAB
PSA SERPL-MCNC: 0.4 NG/ML (ref 0–4)
REFLEX CRITERIA: NORMAL

## 2022-06-03 ENCOUNTER — TELEPHONE (OUTPATIENT)
Dept: FAMILY MEDICINE CLINIC | Age: 67
End: 2022-06-03

## 2022-06-03 NOTE — TELEPHONE ENCOUNTER
Pt called in and states he is wanting to know his lab results please. Did let him know they mailed a letter to him, hasn't received it yet. Call back number for him is [phone]. Thanks.

## 2022-07-07 DIAGNOSIS — M50.30 DDD (DEGENERATIVE DISC DISEASE), CERVICAL: ICD-10-CM

## 2022-07-11 ENCOUNTER — TELEPHONE (OUTPATIENT)
Dept: ONCOLOGY | Age: 67
End: 2022-07-11

## 2022-07-11 RX ORDER — GABAPENTIN 800 MG/1
TABLET ORAL
Qty: 270 TABLET | Refills: 1 | Status: ON HOLD | OUTPATIENT
Start: 2022-07-11

## 2022-07-19 NOTE — PROGRESS NOTES
[id number] [facility] at [address] [place] [address]  [phone]    Hematology / Oncology Consult    Reason for Visit:   Steve Ruvalcaba is a 79 y.o. male who is seen in consultation at the request of Dr. Damaris Vasquez for evaluation of squamous cell carcinoma. 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 Area of carcinoma about 1.5cm in the sections. Cannot assess margins. Prior Treatment: None    Current Treatment: Observation    History of Present Illness:   Steve Ruvalcaba is a 79 y.o. male with COPD, HTN, HELGA is referred for evaluation of squamous cell carcinoma from left elbow. Pt was evaluated by Dr. Damaris Vasquez for massive swelling of left elbow and was thought to have olecranon bursitis. He underwent a surgical removal of the soft tissue mass on 6/9/22 and had finding of bursitis as well as a cystic sac resembling an epidermal inclusion cyst. The cyst was removed along with the sac and sent for pathology, which revealed a very well differentiated squamous cell carcinoma, arising in an epidermal inclusion cyst. Pt denies any fevers, chills, weight loss, sweats. No h/o skin cancer or moles in this region or elsewhere on his body. His left elbow is healing well, but he still has swelling in the left arm. No palpable lymphadenopathy noted by patient. Past Medical History:   Diagnosis Date    Chronic obstructive pulmonary disease (HCC)     Hypertension     HELGA (obstructive sleep apnea) 12/16/2019      No past surgical history on file.    Social History     Tobacco Use    Smoking status: Former     Packs/day: 0.25     Types: Cigarettes    Smokeless tobacco: Never    Tobacco comments:     3 cigarettes daily   Substance Use Topics    Alcohol use: Not Currently     Alcohol/week: 6.0 standard drinks     Types: 6 Cans of beer per week      Family History   Problem Relation Age of Onset    Hypertension Mother      Current Outpatient Medications   Medication Sig    gabapentin (NEURONTIN) 800 mg tablet TAKE 1 TABLET BY MOUTH THREE TIMES DAILY    tiotropium (Spiriva with HandiHaler) 18 mcg inhalation capsule INHALE CONTENTS OF 1 CAPSULE ONCE DAILY USING HANDIHALER    Breo Ellipta 100-25 mcg/dose inhaler INHALE 1 PUFF BY MOUTH DAILY    amLODIPine (NORVASC) 10 mg tablet TAKE 1 TABLET BY MOUTH EVERY DAY    losartan (COZAAR) 100 mg tablet TAKE 1 TABLET BY MOUTH EVERY DAY    diclofenac EC (VOLTAREN) 75 mg EC tablet TAKE 1 TABLET BY MOUTH TWICE DAILY FOR 14 DAYS    HYDROcodone-acetaminophen (NORCO) 5-325 mg per tablet Take 1 Tab by mouth every six (6) hours as needed. (Patient not taking: Reported on 8/10/2021)    cyclobenzaprine (FLEXERIL) 10 mg tablet Take 10 mg by mouth three (3) times daily as needed. (Patient not taking: Reported on 8/10/2021)    aspirin delayed-release 81 mg tablet TAKE 1 TABLET BY MOUTH EVERY DAY (Patient not taking: Reported on 5/25/2022)     No current facility-administered medications for this visit. No Known Allergies     Review of Systems: A complete review of systems was obtained, negative except as described above. Physical Exam:   Blood pressure 133/86, pulse 70, temperature 98.3 °F (36.8 °C), temperature source Oral, height 6' (1.829 m), weight 283 lb 3.2 oz (128.5 kg), SpO2 97 %. ECOG PS: 0  General: Well developed, no acute distress, obese  Eyes: PERRLA, EOMI, anicteric sclerae  HENT: Atraumatic, OP clear  Neck: Supple, no JVD or thyromegaly  Lymphatic: No cervical, supraclavicular, axillary adenopathy  Respiratory: CTAB, normal respiratory effort  CV: Normal rate, regular rhythm, no murmurs, Non pitting edema in left arm. Left elbow with wrap in place.    GI: Soft, nontender, nondistended, no masses, no hepatomegaly, no splenomegaly  MS: Normal gait and station. Digits without clubbing or cyanosis. Skin: No rashes, ecchymoses, or petechiae. Normal temperature, turgor, and texture. Neuro/Psych: Alert, oriented. 5/5 strength in all 4 extremities. Appropriate affect, normal judgment/insight. Results:     Lab Results   Component Value Date/Time    WBC 7.3 05/25/2022 03:36 PM    HGB 15.7 05/25/2022 03:36 PM    HCT 47.8 05/25/2022 03:36 PM    PLATELET 576 49/27/9733 03:36 PM    MCV 92.1 05/25/2022 03:36 PM    ABS. NEUTROPHILS 3.8 05/25/2022 03:36 PM     Lab Results   Component Value Date/Time    Sodium 136 05/25/2022 03:36 PM    Potassium 3.9 05/25/2022 03:36 PM    Chloride 104 05/25/2022 03:36 PM    CO2 24 05/25/2022 03:36 PM    Glucose 98 05/25/2022 03:36 PM    BUN 15 05/25/2022 03:36 PM    Creatinine 1.13 05/25/2022 03:36 PM    GFR est AA >60 05/25/2022 03:36 PM    GFR est non-AA >60 05/25/2022 03:36 PM    Calcium 8.8 05/25/2022 03:36 PM     Lab Results   Component Value Date/Time    Bilirubin, total 0.4 05/25/2022 03:36 PM    ALT (SGPT) 32 05/25/2022 03:36 PM    Alk. phosphatase 85 05/25/2022 03:36 PM    Protein, total 7.8 05/25/2022 03:36 PM    Albumin 3.7 05/25/2022 03:36 PM    Globulin 4.1 (H) 05/25/2022 03:36 PM     Lab Results   Component Value Date/Time    Iron 132 12/15/2020 03:31 PM    TIBC 333 12/15/2020 03:31 PM    Iron % saturation 40 12/15/2020 03:31 PM    Ferritin 259 12/15/2020 03:31 PM       No results found for: B12LT, FOL, RBCF  Lab Results   Component Value Date/Time    TSH 1.59 05/25/2022 03:36 PM     Lab Results   Component Value Date/Time    Hepatitis A, IgM NONREACTIVE 11/24/2020 11:40 AM    Hepatitis B surface Ag <0.10 11/24/2020 11:40 AM    Hepatitis B core, IgM NONREACTIVE 11/24/2020 11:40 AM    Hep B Core Ab, total Negative 12/15/2020 03:31 PM    HEP B SURFACE AB, QUAL Reactive 12/15/2020 03:31 PM         Imaging:     Radiology report(s) reviewed. Assessment & Plan:   Carmen Marquez is a 79 y.o. male is seen for squamous cell carcinoma. 1. Squamous cell carcinoma arising in epidermal inclusion cyst, left elbow:  Has been completely excised. While epidermal inclusion cysts are usually benign and common, malignant transformation into a cutaneous squamous cell carcinoma is quite rare, ranging from 0.011 - 0.045%. ([email or web address].be)  There are several case reports in the literature about similar cases, but there are no established guidelines on appropriate management of this. Because this mass has been completely resected, I do not recommend any further treatment at this time. I also feel that given the small size, lack of systemic abnormal symptoms, CT imaging for staging is not necessary. 2. COPD / HELGA:  Has trouble tolerating HELGA mask. On Breo and Spiriva. 3. Tobacco abuse:  I counseled on multiple risks of smoking as well as cessation techniques including nicotine replacement along with replacing hand-to-mouth habit with alternate habit such as using twizzlers, lollipops, popsicles, even gum. Emotional well being: Pt is coping well with his/her disease and has excellent support. I appreciate the opportunity to participate in Mr. Jerome Griffin [address] [place] [address].     Signed By: Manuel Pleitez MD     July 25, 2022

## 2022-07-21 ENCOUNTER — TELEPHONE (OUTPATIENT)
Dept: ONCOLOGY | Age: 67
End: 2022-07-21

## 2022-07-21 NOTE — TELEPHONE ENCOUNTER
[facility] at [facility]  [phone]        07/21/22 11:24 AM Called patient to remind him of his upcoming new patient appointment on Monday, 07/25, at 2 PM. Patient voiced understanding and confirmed appointment. No further questions or concerns at this time.

## 2022-07-25 ENCOUNTER — OFFICE VISIT (OUTPATIENT)
Dept: ONCOLOGY | Age: 67
End: 2022-07-25
Payer: MEDICARE

## 2022-07-25 VITALS
WEIGHT: 283.2 LBS | OXYGEN SATURATION: 97 % | DIASTOLIC BLOOD PRESSURE: 86 MMHG | SYSTOLIC BLOOD PRESSURE: 133 MMHG | HEIGHT: 72 IN | HEART RATE: 70 BPM | TEMPERATURE: 98.3 F | BODY MASS INDEX: 38.36 KG/M2

## 2022-07-25 DIAGNOSIS — L72.0 EPIDERMAL INCLUSION CYST: ICD-10-CM

## 2022-07-25 DIAGNOSIS — Z71.6 TOBACCO ABUSE COUNSELING: ICD-10-CM

## 2022-07-25 DIAGNOSIS — J43.9 PULMONARY EMPHYSEMA, UNSPECIFIED EMPHYSEMA TYPE (HCC): ICD-10-CM

## 2022-07-25 DIAGNOSIS — D04.62 SQUAMOUS CELL CARCINOMA IN SITU (SCCIS) OF SKIN OF LEFT ELBOW: Primary | ICD-10-CM

## 2022-07-25 PROCEDURE — 99204 OFFICE O/P NEW MOD 45 MIN: CPT | Performed by: INTERNAL MEDICINE

## 2022-07-25 NOTE — PROGRESS NOTES
Cha Goetz is a 79 y.o. male here for evaluation of squamous cell carcinoma. Patient with no complaints of pain at this time.

## 2022-07-25 NOTE — LETTER
7/25/2022    Patient: Adina Dexter   YOB: 1955   Date of Visit: 7/25/2022     Garret Woodson, [address] [address]  [address] [facility] [address]    Dear Garret Woodson MD,      Thank you for referring Mr. Brady Golden to [facility] for evaluation. My notes for this consultation are attached. If you have questions, please do not hesitate to call me. I look forward to following your patient along with you.       Sincerely,    Dustin Kruse MD

## 2022-09-21 RX ORDER — LOSARTAN POTASSIUM 100 MG/1
TABLET ORAL
Qty: 90 TABLET | Refills: 1 | Status: ON HOLD | OUTPATIENT
Start: 2022-09-21

## 2022-10-17 DIAGNOSIS — J43.9 PULMONARY EMPHYSEMA, UNSPECIFIED EMPHYSEMA TYPE (HCC): ICD-10-CM

## 2022-10-17 RX ORDER — AMLODIPINE BESYLATE 10 MG/1
TABLET ORAL
Qty: 90 TABLET | Refills: 1 | Status: ON HOLD | OUTPATIENT
Start: 2022-10-17

## 2022-10-17 RX ORDER — FLUTICASONE FUROATE AND VILANTEROL TRIFENATATE 100; 25 UG/1; UG/1
POWDER RESPIRATORY (INHALATION)
Qty: 60 EACH | Refills: 5 | Status: ON HOLD | OUTPATIENT
Start: 2022-10-17

## 2022-10-31 ENCOUNTER — TRANSCRIBE ORDER (OUTPATIENT)
Dept: SCHEDULING | Age: 67
End: 2022-10-31

## 2022-10-31 DIAGNOSIS — M25.522 LEFT ELBOW PAIN: Primary | ICD-10-CM

## 2022-11-01 ENCOUNTER — HOSPITAL ENCOUNTER (OUTPATIENT)
Dept: MRI IMAGING | Age: 67
Discharge: HOME | DRG: 501 | End: 2022-11-01
Attending: ORTHOPAEDIC SURGERY
Payer: MEDICARE

## 2022-11-01 DIAGNOSIS — M25.522 LEFT ELBOW PAIN: ICD-10-CM

## 2022-11-01 PROCEDURE — 74011250636 HC RX REV CODE- 250/636: Performed by: ORTHOPAEDIC SURGERY

## 2022-11-01 PROCEDURE — 73223 MRI JOINT UPR EXTR W/O&W/DYE: CPT

## 2022-11-01 PROCEDURE — A9576 INJ PROHANCE MULTIPACK: HCPCS | Performed by: ORTHOPAEDIC SURGERY

## 2022-11-01 RX ADMIN — GADOTERIDOL 20 ML: 279.3 INJECTION, SOLUTION INTRAVENOUS at 11:40

## 2022-11-01 NOTE — DISCHARGE INSTRUCTIONS
Upper Extremity Surgery Discharge Instructions  Dr. Renee Ventura / Burton Torres PA-C    Please take the time to review the following instructions before you leave the hospital and use them as guidelines during your recovery from surgery. If you have any questions, you may contact my office at [phone] or via [employer] messaging, which is typically the quickest and most direct method. Wound Care / Dressing Change    Do NOT remove your dressing or get them wet. Please ensure that wound VAC seal is maintained at 125 mmHg. Machine will alert you if otherwise, at which time you may reinforce the seal with adhesive material provided with the VAC. If unable to do so, please contact the office. Myrtie Castorland / Bathing    May bathe/shower as long as dressing/splint/cast is kept dry. Sling    Keep your arm in the immobilizer/sling at all times except when showering and changing your clothes. When showering or changing, keep your arm at your bent inside the splint. Do not excessively move or forcibly straighten the elbow . Activity    No lifting with your affected arm. Please begin using fingers immediately after surgery, working to improve motion of straightening and flexing your fingers several times per day. No driving until further notice. Ice and Elevation    Continue ice consistently for 48 hours after surgery. After 48 hours, you should ice 3 times per day for 20 minutes at a time for the next 5 days. After 1 week from surgery, you may use ice as needed for pain. Diet    You may advance your regular diet as tolerated. Increase your clear liquid intake for the next 2-3 days. Medications    You will be given prescriptions for pain medication, and nausea when you are discharged from the hospital. Please use the medications as prescribed. Pain medications may cause constipation - over the counter Colace or Milk of Magnesia may be used as needed.  Other possible side effects of pain medications are dizziness, headache, nausea, vomiting, and urinary retention. Discontinue the pain medication if you develop itching, rash, shortness of breath, or difficulties swallowing. If these symptoms become severe or arent relieved by discontinuing the medication, you should seek immediate medical attention. Refills of pain medication are authorized during office hours only (8AM - 5PM Monday through Friday)  If you pain medication prescribed at the time of surgery contains Tylenol/Acetaminophen, DO NOT TAKE additional Tylenol/Acetaminophen. Do not exceed 4000mg of Tylenol/Acetaminophen per day. You may resume the medication you were taking prior to your surgery. Pain medication may change the effects of any antidepressant medication you may be taking. If you have any questions about possible interactions between your regular medication and the pain medication, you should consult the physician who prescribes your regular medications. Do not drive until further notice. You were prescribed a nausea medication. It is only necessary to fill this if you are experiencing nausea. Important Signs and Symptoms    Please call Dr. Armaan Elise office at [phone] if you have any increasing numbness or tingling, increasing drainage on your dressing, fever greater than 100.5 degrees F or pain not controlled by medications. If you are experiencing chest pain or shortness of breath, please alert your primary care physician immediately.

## 2022-11-03 ENCOUNTER — ANESTHESIA EVENT (OUTPATIENT)
Dept: SURGERY | Age: 67
DRG: 501 | End: 2022-11-03
Payer: MEDICARE

## 2022-11-04 ENCOUNTER — HOSPITAL ENCOUNTER (INPATIENT)
Age: 67
LOS: 4 days | Discharge: HOME | DRG: 501 | End: 2022-11-08
Attending: ORTHOPAEDIC SURGERY | Admitting: ORTHOPAEDIC SURGERY
Payer: MEDICARE

## 2022-11-04 ENCOUNTER — ANESTHESIA (OUTPATIENT)
Dept: SURGERY | Age: 67
DRG: 501 | End: 2022-11-04
Payer: MEDICARE

## 2022-11-04 DIAGNOSIS — E66.9 OBESITY (BMI 30-39.9): ICD-10-CM

## 2022-11-04 DIAGNOSIS — M70.22 OLECRANON BURSITIS OF LEFT ELBOW: Primary | ICD-10-CM

## 2022-11-04 DIAGNOSIS — A49.8 INFECTION DUE TO ANAEROBES: ICD-10-CM

## 2022-11-04 PROCEDURE — 87205 SMEAR GRAM STAIN: CPT

## 2022-11-04 PROCEDURE — 74011250636 HC RX REV CODE- 250/636: Performed by: PHYSICIAN ASSISTANT

## 2022-11-04 PROCEDURE — 74011250636 HC RX REV CODE- 250/636: Performed by: NURSE ANESTHETIST, CERTIFIED REGISTERED

## 2022-11-04 PROCEDURE — 74011000250 HC RX REV CODE- 250: Performed by: NURSE ANESTHETIST, CERTIFIED REGISTERED

## 2022-11-04 PROCEDURE — 77030003601 HC NDL NRV BLK BBMI -A

## 2022-11-04 PROCEDURE — 2709999900 HC NON-CHARGEABLE SUPPLY: Performed by: ORTHOPAEDIC SURGERY

## 2022-11-04 PROCEDURE — 74011250636 HC RX REV CODE- 250/636: Performed by: STUDENT IN AN ORGANIZED HEALTH CARE EDUCATION/TRAINING PROGRAM

## 2022-11-04 PROCEDURE — 77030002966 HC SUT PDS J&J -A: Performed by: ORTHOPAEDIC SURGERY

## 2022-11-04 PROCEDURE — 88305 TISSUE EXAM BY PATHOLOGIST: CPT

## 2022-11-04 PROCEDURE — 0RCM0ZZ EXTIRPATION OF MATTER FROM LEFT ELBOW JOINT, OPEN APPROACH: ICD-10-PCS | Performed by: ORTHOPAEDIC SURGERY

## 2022-11-04 PROCEDURE — 87077 CULTURE AEROBIC IDENTIFY: CPT

## 2022-11-04 PROCEDURE — 74011250637 HC RX REV CODE- 250/637: Performed by: ORTHOPAEDIC SURGERY

## 2022-11-04 PROCEDURE — 74011000250 HC RX REV CODE- 250: Performed by: PHYSICIAN ASSISTANT

## 2022-11-04 PROCEDURE — 94640 AIRWAY INHALATION TREATMENT: CPT

## 2022-11-04 PROCEDURE — 0MB40ZZ EXCISION OF LEFT ELBOW BURSA AND LIGAMENT, OPEN APPROACH: ICD-10-PCS | Performed by: ORTHOPAEDIC SURGERY

## 2022-11-04 PROCEDURE — 74011250637 HC RX REV CODE- 250/637: Performed by: PHYSICIAN ASSISTANT

## 2022-11-04 PROCEDURE — 65270000029 HC RM PRIVATE

## 2022-11-04 PROCEDURE — 77030040922 HC BLNKT HYPOTHRM STRY -A

## 2022-11-04 PROCEDURE — 74011250636 HC RX REV CODE- 250/636: Performed by: ANESTHESIOLOGY

## 2022-11-04 PROCEDURE — 76210000036 HC AMBSU PH I REC 1.5 TO 2 HR: Performed by: ORTHOPAEDIC SURGERY

## 2022-11-04 PROCEDURE — 87075 CULTR BACTERIA EXCEPT BLOOD: CPT

## 2022-11-04 PROCEDURE — 87102 FUNGUS ISOLATION CULTURE: CPT

## 2022-11-04 PROCEDURE — 77030026438 HC STYL ET INTUB CARD -A: Performed by: ANESTHESIOLOGY

## 2022-11-04 PROCEDURE — 77030008684 HC TU ET CUF COVD -B: Performed by: ANESTHESIOLOGY

## 2022-11-04 PROCEDURE — 76030000003 HC AMB SURG OR TIME 1.5 TO 2: Performed by: ORTHOPAEDIC SURGERY

## 2022-11-04 PROCEDURE — 74011000250 HC RX REV CODE- 250: Performed by: ORTHOPAEDIC SURGERY

## 2022-11-04 PROCEDURE — 87185 SC STD ENZYME DETCJ PER NZM: CPT

## 2022-11-04 PROCEDURE — 76060000063 HC AMB SURG ANES 1.5 TO 2 HR: Performed by: ORTHOPAEDIC SURGERY

## 2022-11-04 PROCEDURE — 77030019952 HC CANSTR VAC ASST KCON -B: Performed by: ORTHOPAEDIC SURGERY

## 2022-11-04 PROCEDURE — 77030013079 HC BLNKT BAIR HGGR 3M -A: Performed by: ANESTHESIOLOGY

## 2022-11-04 PROCEDURE — 77030040361 HC SLV COMPR DVT MDII -B

## 2022-11-04 PROCEDURE — 77030011283 HC ELECTRD NDL COVD -A: Performed by: ORTHOPAEDIC SURGERY

## 2022-11-04 PROCEDURE — 87186 SC STD MICRODIL/AGAR DIL: CPT

## 2022-11-04 PROCEDURE — 77030000032 HC CUF TRNQT ZIMM -B: Performed by: ORTHOPAEDIC SURGERY

## 2022-11-04 RX ORDER — PHENYLEPHRINE HCL IN 0.9% NACL 0.4MG/10ML
SYRINGE (ML) INTRAVENOUS AS NEEDED
Status: DISCONTINUED | OUTPATIENT
Start: 2022-11-04 | End: 2022-11-04 | Stop reason: HOSPADM

## 2022-11-04 RX ORDER — LIDOCAINE HYDROCHLORIDE 20 MG/ML
INJECTION, SOLUTION EPIDURAL; INFILTRATION; INTRACAUDAL; PERINEURAL AS NEEDED
Status: DISCONTINUED | OUTPATIENT
Start: 2022-11-04 | End: 2022-11-04 | Stop reason: HOSPADM

## 2022-11-04 RX ORDER — HYDROMORPHONE HYDROCHLORIDE 2 MG/ML
.5-1 INJECTION, SOLUTION INTRAMUSCULAR; INTRAVENOUS; SUBCUTANEOUS
Status: DISCONTINUED | OUTPATIENT
Start: 2022-11-04 | End: 2022-11-04 | Stop reason: CLARIF

## 2022-11-04 RX ORDER — SODIUM CHLORIDE, SODIUM LACTATE, POTASSIUM CHLORIDE, CALCIUM CHLORIDE 600; 310; 30; 20 MG/100ML; MG/100ML; MG/100ML; MG/100ML
125 INJECTION, SOLUTION INTRAVENOUS CONTINUOUS
Status: DISCONTINUED | OUTPATIENT
Start: 2022-11-04 | End: 2022-11-04 | Stop reason: HOSPADM

## 2022-11-04 RX ORDER — HYDROMORPHONE HYDROCHLORIDE 1 MG/ML
.5-1 INJECTION, SOLUTION INTRAMUSCULAR; INTRAVENOUS; SUBCUTANEOUS
Status: DISCONTINUED | OUTPATIENT
Start: 2022-11-04 | End: 2022-11-04 | Stop reason: HOSPADM

## 2022-11-04 RX ORDER — HYDROCODONE BITARTRATE AND ACETAMINOPHEN 5; 325 MG/1; MG/1
1 TABLET ORAL
Status: DISCONTINUED | OUTPATIENT
Start: 2022-11-04 | End: 2022-11-05

## 2022-11-04 RX ORDER — FACIAL-BODY WIPES
10 EACH TOPICAL DAILY PRN
Status: DISCONTINUED | OUTPATIENT
Start: 2022-11-06 | End: 2022-11-08 | Stop reason: HOSPADM

## 2022-11-04 RX ORDER — POLYETHYLENE GLYCOL 3350 17 G/17G
17 POWDER, FOR SOLUTION ORAL DAILY
Status: DISCONTINUED | OUTPATIENT
Start: 2022-11-05 | End: 2022-11-08 | Stop reason: HOSPADM

## 2022-11-04 RX ORDER — IPRATROPIUM BROMIDE 0.5 MG/2.5ML
0.5 SOLUTION RESPIRATORY (INHALATION)
Status: DISCONTINUED | OUTPATIENT
Start: 2022-11-05 | End: 2022-11-05

## 2022-11-04 RX ORDER — NALOXONE HYDROCHLORIDE 0.4 MG/ML
0.4 INJECTION, SOLUTION INTRAMUSCULAR; INTRAVENOUS; SUBCUTANEOUS AS NEEDED
Status: DISCONTINUED | OUTPATIENT
Start: 2022-11-04 | End: 2022-11-08 | Stop reason: HOSPADM

## 2022-11-04 RX ORDER — PROPOFOL 10 MG/ML
INJECTION, EMULSION INTRAVENOUS AS NEEDED
Status: DISCONTINUED | OUTPATIENT
Start: 2022-11-04 | End: 2022-11-04 | Stop reason: HOSPADM

## 2022-11-04 RX ORDER — FAMOTIDINE 20 MG/1
20 TABLET, FILM COATED ORAL 2 TIMES DAILY
Status: DISCONTINUED | OUTPATIENT
Start: 2022-11-04 | End: 2022-11-08 | Stop reason: HOSPADM

## 2022-11-04 RX ORDER — GLYCOPYRROLATE 0.2 MG/ML
INJECTION INTRAMUSCULAR; INTRAVENOUS AS NEEDED
Status: DISCONTINUED | OUTPATIENT
Start: 2022-11-04 | End: 2022-11-04 | Stop reason: HOSPADM

## 2022-11-04 RX ORDER — LIDOCAINE HYDROCHLORIDE 10 MG/ML
0.1 INJECTION, SOLUTION EPIDURAL; INFILTRATION; INTRACAUDAL; PERINEURAL AS NEEDED
Status: DISCONTINUED | OUTPATIENT
Start: 2022-11-04 | End: 2022-11-04 | Stop reason: HOSPADM

## 2022-11-04 RX ORDER — ONDANSETRON 2 MG/ML
INJECTION INTRAMUSCULAR; INTRAVENOUS AS NEEDED
Status: DISCONTINUED | OUTPATIENT
Start: 2022-11-04 | End: 2022-11-04 | Stop reason: HOSPADM

## 2022-11-04 RX ORDER — AMLODIPINE BESYLATE 5 MG/1
10 TABLET ORAL DAILY
Status: DISCONTINUED | OUTPATIENT
Start: 2022-11-05 | End: 2022-11-04

## 2022-11-04 RX ORDER — VANCOMYCIN/0.9 % SOD CHLORIDE 1.5G/250ML
1500 PLASTIC BAG, INJECTION (ML) INTRAVENOUS EVERY 12 HOURS
Status: COMPLETED | OUTPATIENT
Start: 2022-11-04 | End: 2022-11-05

## 2022-11-04 RX ORDER — DEXAMETHASONE SODIUM PHOSPHATE 100 MG/10ML
INJECTION INTRAMUSCULAR; INTRAVENOUS AS NEEDED
Status: DISCONTINUED | OUTPATIENT
Start: 2022-11-04 | End: 2022-11-04 | Stop reason: HOSPADM

## 2022-11-04 RX ORDER — SUCCINYLCHOLINE CHLORIDE 20 MG/ML
INJECTION INTRAMUSCULAR; INTRAVENOUS AS NEEDED
Status: DISCONTINUED | OUTPATIENT
Start: 2022-11-04 | End: 2022-11-04 | Stop reason: HOSPADM

## 2022-11-04 RX ORDER — IPRATROPIUM BROMIDE 0.5 MG/2.5ML
0.5 SOLUTION RESPIRATORY (INHALATION)
Status: DISCONTINUED | OUTPATIENT
Start: 2022-11-04 | End: 2022-11-04

## 2022-11-04 RX ORDER — SODIUM CHLORIDE 0.9 % (FLUSH) 0.9 %
5-40 SYRINGE (ML) INJECTION AS NEEDED
Status: DISCONTINUED | OUTPATIENT
Start: 2022-11-04 | End: 2022-11-08 | Stop reason: HOSPADM

## 2022-11-04 RX ORDER — ASPIRIN 325 MG
325 TABLET, DELAYED RELEASE (ENTERIC COATED) ORAL EVERY 12 HOURS
Status: DISCONTINUED | OUTPATIENT
Start: 2022-11-04 | End: 2022-11-08 | Stop reason: HOSPADM

## 2022-11-04 RX ORDER — MIDAZOLAM HYDROCHLORIDE 1 MG/ML
INJECTION, SOLUTION INTRAMUSCULAR; INTRAVENOUS AS NEEDED
Status: DISCONTINUED | OUTPATIENT
Start: 2022-11-04 | End: 2022-11-04 | Stop reason: HOSPADM

## 2022-11-04 RX ORDER — LOSARTAN POTASSIUM 25 MG/1
100 TABLET ORAL DAILY
Status: DISCONTINUED | OUTPATIENT
Start: 2022-11-05 | End: 2022-11-04

## 2022-11-04 RX ORDER — AMOXICILLIN 250 MG
1 CAPSULE ORAL 2 TIMES DAILY
Status: DISCONTINUED | OUTPATIENT
Start: 2022-11-04 | End: 2022-11-08 | Stop reason: HOSPADM

## 2022-11-04 RX ORDER — LOSARTAN POTASSIUM 25 MG/1
100 TABLET ORAL DAILY
Status: DISCONTINUED | OUTPATIENT
Start: 2022-11-04 | End: 2022-11-08 | Stop reason: HOSPADM

## 2022-11-04 RX ORDER — ACETAMINOPHEN 500 MG
1000 TABLET ORAL EVERY 6 HOURS
Status: DISCONTINUED | OUTPATIENT
Start: 2022-11-04 | End: 2022-11-08 | Stop reason: HOSPADM

## 2022-11-04 RX ORDER — HYDROCODONE BITARTRATE AND ACETAMINOPHEN 5; 325 MG/1; MG/1
1-2 TABLET ORAL
Status: DISCONTINUED | OUTPATIENT
Start: 2022-11-04 | End: 2022-11-04

## 2022-11-04 RX ORDER — HYDROXYZINE HYDROCHLORIDE 10 MG/1
10 TABLET, FILM COATED ORAL
Status: DISCONTINUED | OUTPATIENT
Start: 2022-11-04 | End: 2022-11-08 | Stop reason: HOSPADM

## 2022-11-04 RX ORDER — HYDROCODONE BITARTRATE AND ACETAMINOPHEN 5; 325 MG/1; MG/1
2 TABLET ORAL
Status: DISCONTINUED | OUTPATIENT
Start: 2022-11-04 | End: 2022-11-05

## 2022-11-04 RX ORDER — SODIUM CHLORIDE 0.9 % (FLUSH) 0.9 %
5-40 SYRINGE (ML) INJECTION EVERY 8 HOURS
Status: DISCONTINUED | OUTPATIENT
Start: 2022-11-04 | End: 2022-11-08 | Stop reason: HOSPADM

## 2022-11-04 RX ORDER — AMLODIPINE BESYLATE 5 MG/1
10 TABLET ORAL DAILY
Status: DISCONTINUED | OUTPATIENT
Start: 2022-11-04 | End: 2022-11-08 | Stop reason: HOSPADM

## 2022-11-04 RX ORDER — ONDANSETRON 2 MG/ML
4 INJECTION INTRAMUSCULAR; INTRAVENOUS
Status: ACTIVE | OUTPATIENT
Start: 2022-11-04 | End: 2022-11-05

## 2022-11-04 RX ORDER — ONDANSETRON 2 MG/ML
4 INJECTION INTRAMUSCULAR; INTRAVENOUS AS NEEDED
Status: DISCONTINUED | OUTPATIENT
Start: 2022-11-04 | End: 2022-11-04 | Stop reason: HOSPADM

## 2022-11-04 RX ORDER — FENTANYL CITRATE 50 UG/ML
INJECTION, SOLUTION INTRAMUSCULAR; INTRAVENOUS AS NEEDED
Status: DISCONTINUED | OUTPATIENT
Start: 2022-11-04 | End: 2022-11-04 | Stop reason: HOSPADM

## 2022-11-04 RX ADMIN — FAMOTIDINE 20 MG: 20 TABLET, FILM COATED ORAL at 18:08

## 2022-11-04 RX ADMIN — FENTANYL CITRATE 25 MCG: 50 INJECTION, SOLUTION INTRAMUSCULAR; INTRAVENOUS at 14:58

## 2022-11-04 RX ADMIN — FENTANYL CITRATE 50 MCG: 50 INJECTION, SOLUTION INTRAMUSCULAR; INTRAVENOUS at 12:48

## 2022-11-04 RX ADMIN — ONDANSETRON 4 MG: 2 INJECTION INTRAMUSCULAR; INTRAVENOUS at 16:23

## 2022-11-04 RX ADMIN — Medication 10 ML: at 18:11

## 2022-11-04 RX ADMIN — ASPIRIN 325 MG: 325 TABLET, COATED ORAL at 21:15

## 2022-11-04 RX ADMIN — SUCCINYLCHOLINE CHLORIDE 100 MG: 20 INJECTION, SOLUTION INTRAMUSCULAR; INTRAVENOUS at 14:20

## 2022-11-04 RX ADMIN — FENTANYL CITRATE 50 MCG: 50 INJECTION, SOLUTION INTRAMUSCULAR; INTRAVENOUS at 14:20

## 2022-11-04 RX ADMIN — SODIUM CHLORIDE, POTASSIUM CHLORIDE, SODIUM LACTATE AND CALCIUM CHLORIDE: 600; 310; 30; 20 INJECTION, SOLUTION INTRAVENOUS at 11:40

## 2022-11-04 RX ADMIN — SENNOSIDES AND DOCUSATE SODIUM 1 TABLET: 50; 8.6 TABLET ORAL at 18:08

## 2022-11-04 RX ADMIN — Medication 80 MCG: at 15:25

## 2022-11-04 RX ADMIN — GABAPENTIN 800 MG: 300 CAPSULE ORAL at 21:15

## 2022-11-04 RX ADMIN — PROPOFOL 200 MG: 10 INJECTION, EMULSION INTRAVENOUS at 14:20

## 2022-11-04 RX ADMIN — AMLODIPINE BESYLATE 10 MG: 5 TABLET ORAL at 19:36

## 2022-11-04 RX ADMIN — ACETAMINOPHEN 1000 MG: 500 TABLET ORAL at 18:09

## 2022-11-04 RX ADMIN — HYDROCODONE BITARTRATE AND ACETAMINOPHEN 2 TABLET: 5; 325 TABLET ORAL at 18:08

## 2022-11-04 RX ADMIN — Medication 3 G: at 14:44

## 2022-11-04 RX ADMIN — MEPIVACAINE HYDROCHLORIDE 30 ML: 20 INJECTION, SOLUTION EPIDURAL; INFILTRATION at 12:56

## 2022-11-04 RX ADMIN — BUDESONIDE INHALATION SUSPENSION: 0.5 SUSPENSION RESPIRATORY (INHALATION) at 20:52

## 2022-11-04 RX ADMIN — LOSARTAN POTASSIUM 100 MG: 25 TABLET, FILM COATED ORAL at 19:36

## 2022-11-04 RX ADMIN — SUCCINYLCHOLINE CHLORIDE 100 MG: 20 INJECTION, SOLUTION INTRAMUSCULAR; INTRAVENOUS at 14:21

## 2022-11-04 RX ADMIN — FENTANYL CITRATE 50 MCG: 50 INJECTION, SOLUTION INTRAMUSCULAR; INTRAVENOUS at 12:46

## 2022-11-04 RX ADMIN — DEXAMETHASONE SODIUM PHOSPHATE 4 MG: 10 INJECTION INTRAMUSCULAR; INTRAVENOUS at 14:42

## 2022-11-04 RX ADMIN — IPRATROPIUM BROMIDE 0.5 MG: 0.5 SOLUTION RESPIRATORY (INHALATION) at 20:53

## 2022-11-04 RX ADMIN — GLYCOPYRROLATE 0.4 MG: 0.2 INJECTION INTRAMUSCULAR; INTRAVENOUS at 13:55

## 2022-11-04 RX ADMIN — ONDANSETRON HYDROCHLORIDE 4 MG: 2 SOLUTION INTRAMUSCULAR; INTRAVENOUS at 14:43

## 2022-11-04 RX ADMIN — LIDOCAINE HYDROCHLORIDE 100 MG: 20 INJECTION, SOLUTION INTRAVENOUS at 14:20

## 2022-11-04 RX ADMIN — HYDROCODONE BITARTRATE AND ACETAMINOPHEN 2 TABLET: 5; 325 TABLET ORAL at 22:16

## 2022-11-04 RX ADMIN — MIDAZOLAM HYDROCHLORIDE 2 MG: 1 INJECTION, SOLUTION INTRAMUSCULAR; INTRAVENOUS at 12:46

## 2022-11-04 RX ADMIN — VANCOMYCIN HYDROCHLORIDE 1500 MG: 10 INJECTION, POWDER, LYOPHILIZED, FOR SOLUTION INTRAVENOUS at 18:10

## 2022-11-04 RX ADMIN — HYDROMORPHONE HYDROCHLORIDE 0.5 MG: 1 INJECTION, SOLUTION INTRAMUSCULAR; INTRAVENOUS; SUBCUTANEOUS at 16:23

## 2022-11-04 RX ADMIN — GABAPENTIN 800 MG: 300 CAPSULE ORAL at 18:08

## 2022-11-04 RX ADMIN — PROPOFOL 50 MG: 10 INJECTION, EMULSION INTRAVENOUS at 14:21

## 2022-11-04 NOTE — ANESTHESIA PREPROCEDURE EVALUATION
Relevant Problems   RESPIRATORY SYSTEM   (+) HELGA (obstructive sleep apnea)   (+) Pulmonary emphysema (HCC)      CARDIOVASCULAR   (+) Essential hypertension      ENDOCRINE   (+) Severe obesity (HCC)       Anesthetic History   No history of anesthetic complications            Review of Systems / Medical History  Patient summary reviewed, nursing notes reviewed and pertinent labs reviewed    Pulmonary    COPD    Sleep apnea           Neuro/Psych   Within defined limits           Cardiovascular    Hypertension                   GI/Hepatic/Renal     GERD           Endo/Other        Obesity     Other Findings              Physical Exam    Airway  Mallampati: III  TM Distance: 4 - 6 cm  Neck ROM: normal range of motion   Mouth opening: Normal     Cardiovascular    Rhythm: regular  Rate: normal         Dental    Dentition: Poor dentition     Pulmonary  Breath sounds clear to auscultation               Abdominal  GI exam deferred       Other Findings            Anesthetic Plan    ASA: 3  Anesthesia type: regional          Induction: Intravenous  Anesthetic plan and risks discussed with: Patient

## 2022-11-04 NOTE — ANESTHESIA POSTPROCEDURE EVALUATION
Procedure(s):  LEFT OLECRANON BURSECTOMY, LEFT ELBOW OPEN BIOPSY WITH SOFT TISSUE PROCEDRES AS INDICATED (REGIONAL BLOCK W/MAC). regional, general    Anesthesia Post Evaluation      Multimodal analgesia: multimodal analgesia used between 6 hours prior to anesthesia start to PACU discharge  Patient location during evaluation: PACU  Patient participation: complete - patient participated  Level of consciousness: awake and alert  Pain management: adequate  Airway patency: patent  Anesthetic complications: no  Cardiovascular status: acceptable  Respiratory status: acceptable  Hydration status: acceptable  Post anesthesia nausea and vomiting:  none  Final Post Anesthesia Temperature Assessment:  Normothermia (36.0-37.5 degrees C)      INITIAL Post-op Vital signs:   Vitals Value Taken Time   /99 11/04/22 1555   Temp 36.4 °C (97.6 °F) 11/04/22 1552   Pulse 67 11/04/22 1601   Resp 16 11/04/22 1601   SpO2 97 % 11/04/22 1601   Vitals shown include unvalidated device data.

## 2022-11-04 NOTE — PERIOP NOTES
TRANSFER - OUT REPORT:    Verbal report given to Justin Rojas (name) on Madhav Neal  being transferred to [facility] [id number] (unit) for routine post - op       Report consisted of patients Situation, Background, Assessment and   Recommendations(SBAR). Information from the following report(s) SBAR, Kardex, Intake/Output, MAR, and Recent Results was reviewed with the receiving nurse. Lines:   Peripheral IV 11/04/22 Anterior;Distal;Right Forearm (Active)   Site Assessment Clean, dry, & intact 11/04/22 1721   Phlebitis Assessment 0 11/04/22 1651   Infiltration Assessment 0 11/04/22 1651   Dressing Status Clean, dry, & intact 11/04/22 1651   Dressing Type Tape;Transparent 11/04/22 1651   Hub Color/Line Status Pink; Infusing 11/04/22 1651   Action Taken Open ports on tubing capped 11/04/22 1651   Alcohol Cap Used Yes 11/04/22 1651        Opportunity for questions and clarification was provided.       Patient transported with:   Registered Nurse

## 2022-11-04 NOTE — PROGRESS NOTES
Atrovent Nebs were therapeutically interchanged for Spiriva  per the P&T Committee approved Therapeutic Interchanges Policy.     Bird Suresh MS, Pharmacist  11/4/2022 5:32 PM

## 2022-11-04 NOTE — H&P
Orthopedic Admission History and Physical        NAME: Joshua Helm       :  1955       MRN:  [id number]      Subjective:     Patient is a 79 y.o. male who presents with history of left elbow olecranon bursitis. Presents today for surgical treatment. Patient Active Problem List    Diagnosis Date Noted    Abnormal liver enzymes 2021    Severe obesity ([address] 2019    Peripheral vascular disease ([address] 2019    Pulmonary emphysema ([address] 2019    DDD (degenerative disc disease), cervical 2019    Essential hypertension 2019    HELGA (obstructive sleep apnea) 2019     Past Medical History:   Diagnosis Date    Chronic obstructive pulmonary disease (HCC)     GERD (gastroesophageal reflux disease)     Hypertension     HELGA (obstructive sleep apnea) 2019      History reviewed. No pertinent surgical history. Prior to Admission medications    Medication Sig Start Date End Date Taking? Authorizing Provider   tiotropium (Spiriva with HandiHaler) 18 mcg inhalation capsule INHALE THE CONTENTS OF 1 CAPSULE VIA INHALATION DEVICE EVERY DAY 10/20/22   Yazmin Tellez MD   amLODIPine (NORVASC) 10 mg tablet TAKE 1 TABLET BY MOUTH EVERY DAY 10/17/22   Yazmin Tellez MD   Breo Ellipta 100-25 mcg/dose inhaler INHALE 1 PUFF BY MOUTH DAILY 10/17/22   Yazmin Tellez MD   losartan (COZAAR) 100 mg tablet TAKE 1 TABLET BY MOUTH EVERY DAY 22   Yazmin Tellez MD   gabapentin (NEURONTIN) 800 mg tablet TAKE 1 TABLET BY MOUTH THREE TIMES DAILY 22   Yazmin Tellez MD   diclofenac EC (VOLTAREN) 75 mg EC tablet TAKE 1 TABLET BY MOUTH TWICE DAILY FOR 14 DAYS 22   Yazmin Tellez MD   HYDROcodone-acetaminophen [facility]) 5-325 mg per tablet Take 1 Tab by mouth every six (6) hours as needed. Patient not taking: No sig reported 4/10/20   Provider, Historical   cyclobenzaprine (FLEXERIL) 10 mg tablet Take 10 mg by mouth three (3) times daily as needed.   Patient not taking: No sig reported 3/6/20   Provider, Historical   aspirin delayed-release 81 mg tablet TAKE 1 TABLET BY MOUTH EVERY DAY  Patient not taking: No sig reported 3/9/20   Yane Goncalves MD     Current Facility-Administered Medications   Medication Dose Route Frequency    ceFAZolin (ANCEF) 3 g in 0.9%  ml IVPB  3 g IntraVENous ONCE    lactated Ringers infusion  125 mL/hr IntraVENous CONTINUOUS    lidocaine (PF) (XYLOCAINE) 10 mg/mL (1 %) injection 0.1 mL  0.1 mL SubCUTAneous PRN      No Known Allergies   Social History     Tobacco Use    Smoking status: Former     Packs/day: 0.25     Types: Cigarettes    Smokeless tobacco: Never    Tobacco comments:     3 cigarettes daily   Substance Use Topics    Alcohol use: Not Currently     Alcohol/week: 6.0 standard drinks     Types: 6 Cans of beer per week      Family History   Problem Relation Age of Onset    Hypertension Mother         Review of Systems  A comprehensive review of systems was negative except for that written in the HPI. Objective:     No data found. No data recorded. Physical Exam:  General appearance: alert, cooperative, no distress, appears stated age  Lungs: No use of accessory breathing muscles. Breathing unlabored. Cardiac: Regular rate. Abdomen: soft, non-tender, non-distended  Extremities: As per prior exam.     Labs: No results found for this or any previous visit (from the past 24 hour(s)). Assessment:   No medical contraindications to proceeding with planned surgery. Please see initial office note for full discussion of risks, benefits, and alternatives to surgery.     Patient Active Problem List    Diagnosis Date Noted    Abnormal liver enzymes 01/03/2021    Severe obesity ([address] 12/16/2019    Peripheral vascular disease ([address] 12/16/2019    Pulmonary emphysema ([address] 12/16/2019    DDD (degenerative disc disease), cervical 12/16/2019    Essential hypertension 12/16/2019    HELGA (obstructive sleep apnea) 12/16/2019         Plan:   Proceed with surgery  Pt. stable  Pt.  NPO x meds

## 2022-11-04 NOTE — ANESTHESIA PROCEDURE NOTES
Peripheral Block    Start time: 11/4/2022 12:46 PM  End time: 11/4/2022 12:56 PM  Performed by: Lanny Stapleton MD  Authorized by: Lanny Stapleton MD       Pre-procedure:    Indications: at surgeon's request and primary anesthetic    Preanesthetic Checklist: patient identified, risks and benefits discussed, site marked, timeout performed, anesthesia consent given, patient being monitored and fire risk safety assessment completed and verbalized    Timeout Time: 12:46 EDT      Block Type:   Block Type:  Supraclavicular  Laterality:  Left  Monitoring:  Continuous pulse ox, frequent vital sign checks, heart rate, responsive to questions and oxygen  Injection Technique:  Single shot  Procedures: ultrasound guided    Patient Position: supine  Prep: chlorhexidine    Location:  Supraclavicular  Needle Type:  Stimuplex  Needle Gauge:  22 G  Needle Localization:  Anatomical landmarks and ultrasound guidance  Med Admin Time: 11/4/2022 12:56 PM    Assessment:  Number of attempts:  1  Injection Assessment:  Incremental injection every 5 mL, local visualized surrounding nerve on ultrasound, negative aspiration for blood, no paresthesia and no intravascular symptoms  Patient tolerance:  Patient tolerated the procedure well with no immediate complications

## 2022-11-05 PROCEDURE — 65270000029 HC RM PRIVATE

## 2022-11-05 PROCEDURE — 74011000250 HC RX REV CODE- 250: Performed by: PHYSICIAN ASSISTANT

## 2022-11-05 PROCEDURE — 74011000250 HC RX REV CODE- 250: Performed by: ORTHOPAEDIC SURGERY

## 2022-11-05 PROCEDURE — 74011250637 HC RX REV CODE- 250/637: Performed by: ORTHOPAEDIC SURGERY

## 2022-11-05 PROCEDURE — 94664 DEMO&/EVAL PT USE INHALER: CPT

## 2022-11-05 PROCEDURE — 74011250637 HC RX REV CODE- 250/637: Performed by: PHYSICIAN ASSISTANT

## 2022-11-05 PROCEDURE — 74011250637 HC RX REV CODE- 250/637

## 2022-11-05 PROCEDURE — 94640 AIRWAY INHALATION TREATMENT: CPT

## 2022-11-05 PROCEDURE — 74011250636 HC RX REV CODE- 250/636: Performed by: PHYSICIAN ASSISTANT

## 2022-11-05 PROCEDURE — 74011250637 HC RX REV CODE- 250/637: Performed by: NURSE PRACTITIONER

## 2022-11-05 PROCEDURE — 94761 N-INVAS EAR/PLS OXIMETRY MLT: CPT

## 2022-11-05 RX ORDER — OXYCODONE HYDROCHLORIDE 5 MG/1
10 TABLET ORAL
Status: DISCONTINUED | OUTPATIENT
Start: 2022-11-05 | End: 2022-11-08 | Stop reason: HOSPADM

## 2022-11-05 RX ORDER — IPRATROPIUM BROMIDE 0.5 MG/2.5ML
0.5 SOLUTION RESPIRATORY (INHALATION)
Status: DISCONTINUED | OUTPATIENT
Start: 2022-11-06 | End: 2022-11-08 | Stop reason: HOSPADM

## 2022-11-05 RX ORDER — OXYCODONE HYDROCHLORIDE 5 MG/1
5 TABLET ORAL
Status: DISCONTINUED | OUTPATIENT
Start: 2022-11-05 | End: 2022-11-08 | Stop reason: HOSPADM

## 2022-11-05 RX ORDER — OXYCODONE HYDROCHLORIDE 5 MG/1
TABLET ORAL
Status: COMPLETED
Start: 2022-11-05 | End: 2022-11-05

## 2022-11-05 RX ADMIN — FAMOTIDINE 20 MG: 20 TABLET, FILM COATED ORAL at 08:56

## 2022-11-05 RX ADMIN — HYDROCODONE BITARTRATE AND ACETAMINOPHEN 2 TABLET: 5; 325 TABLET ORAL at 08:56

## 2022-11-05 RX ADMIN — OXYCODONE 10 MG: 5 TABLET ORAL at 15:14

## 2022-11-05 RX ADMIN — BUDESONIDE INHALATION SUSPENSION: 0.5 SUSPENSION RESPIRATORY (INHALATION) at 07:13

## 2022-11-05 RX ADMIN — BUDESONIDE INHALATION SUSPENSION: 0.5 SUSPENSION RESPIRATORY (INHALATION) at 20:29

## 2022-11-05 RX ADMIN — OXYCODONE 10 MG: 5 TABLET ORAL at 11:31

## 2022-11-05 RX ADMIN — IPRATROPIUM BROMIDE 0.5 MG: 0.5 SOLUTION RESPIRATORY (INHALATION) at 07:13

## 2022-11-05 RX ADMIN — OXYCODONE 10 MG: 5 TABLET ORAL at 18:29

## 2022-11-05 RX ADMIN — Medication 10 ML: at 21:02

## 2022-11-05 RX ADMIN — IPRATROPIUM BROMIDE 0.5 MG: 0.5 SOLUTION RESPIRATORY (INHALATION) at 16:00

## 2022-11-05 RX ADMIN — Medication 10 ML: at 06:00

## 2022-11-05 RX ADMIN — AMLODIPINE BESYLATE 10 MG: 5 TABLET ORAL at 08:56

## 2022-11-05 RX ADMIN — ACETAMINOPHEN 1000 MG: 500 TABLET ORAL at 23:49

## 2022-11-05 RX ADMIN — POLYETHYLENE GLYCOL 3350 17 G: 17 POWDER, FOR SOLUTION ORAL at 08:56

## 2022-11-05 RX ADMIN — GABAPENTIN 800 MG: 300 CAPSULE ORAL at 15:13

## 2022-11-05 RX ADMIN — FAMOTIDINE 20 MG: 20 TABLET, FILM COATED ORAL at 18:29

## 2022-11-05 RX ADMIN — OXYCODONE 10 MG: 5 TABLET ORAL at 22:25

## 2022-11-05 RX ADMIN — SENNOSIDES AND DOCUSATE SODIUM 1 TABLET: 50; 8.6 TABLET ORAL at 18:29

## 2022-11-05 RX ADMIN — LOSARTAN POTASSIUM 100 MG: 25 TABLET, FILM COATED ORAL at 08:56

## 2022-11-05 RX ADMIN — GABAPENTIN 800 MG: 300 CAPSULE ORAL at 22:24

## 2022-11-05 RX ADMIN — CEFTRIAXONE 2 G: 2 INJECTION, POWDER, FOR SOLUTION INTRAMUSCULAR; INTRAVENOUS at 18:29

## 2022-11-05 RX ADMIN — ASPIRIN 325 MG: 325 TABLET, COATED ORAL at 08:56

## 2022-11-05 RX ADMIN — VANCOMYCIN HYDROCHLORIDE 1250 MG: 1.25 INJECTION, POWDER, LYOPHILIZED, FOR SOLUTION INTRAVENOUS at 20:46

## 2022-11-05 RX ADMIN — VANCOMYCIN HYDROCHLORIDE 1500 MG: 10 INJECTION, POWDER, LYOPHILIZED, FOR SOLUTION INTRAVENOUS at 06:31

## 2022-11-05 RX ADMIN — GABAPENTIN 800 MG: 300 CAPSULE ORAL at 08:56

## 2022-11-05 RX ADMIN — SENNOSIDES AND DOCUSATE SODIUM 1 TABLET: 50; 8.6 TABLET ORAL at 08:56

## 2022-11-05 RX ADMIN — ASPIRIN 325 MG: 325 TABLET, COATED ORAL at 20:46

## 2022-11-05 RX ADMIN — ACETAMINOPHEN 1000 MG: 500 TABLET ORAL at 00:38

## 2022-11-05 NOTE — OP NOTES
[facility] 79  OPERATIVE REPORT    Name:  Aixa Blunt  MR#:  [id number]  :  1955  ACCOUNT #:  [de-identified]  DATE OF SERVICE:  2022    PREOPERATIVE DIAGNOSES:  1. Left elbow draining wound, chronic. 2.  History of bursitis, left elbow. 3.  History of squamous cell carcinoma of left elbow. POSTOPERATIVE DIAGNOSES:  1. Left elbow draining wound, chronic. 2.  History of bursitis, left elbow. 3.  History of squamous cell carcinoma of left elbow. PROCEDURE PERFORMED:  1. Excisional debridement/bursectomy, left elbow. 2.  Extensive debridement of skin, subcutaneous tissue, and bone. 3.  Application of wound VAC. SURGEON:  Gideon Sanchez MD    ASSISTANT:  Ely Love PA-C    ANESTHESIA:  General and regional.    COMPLICATIONS:  None. SPECIMENS REMOVED:  Left elbow fluid for culture. Subcutaneous contents were sent for permanent specimen. IMPLANTS:  None. ESTIMATED BLOOD LOSS:  20cc. FINDINGS:  There was a draining sinus over the posterior elbow lateral to the prior surgical incision which was draining purulent appearing fluid, deep to this area was a disorganized pasty appearing whitish particulate tissue that did not appear grossly purulent, noted that this appear to be typical bursitis. This was amorphous and poorly circumscribed, however, did not invade the bone or underlying fascia. INDICATIONS FOR PROCEDURE:  The patient is a 59-year-old gentleman who has a complicated history of left elbow, in 2022 he underwent ulnar neurolysis at the elbow for profound ulnar neuropathy in the setting of a mass and swelling of the elbow, which is presumed to be associated with bursitis. He does have a history of gout as well; however, this was found to be consistent with bursitis though also with evidence of squamous cell carcinoma.   He has had persistent difficulty with wound healing and presented to the office with persistent drainage which he reports has been present for at least 2 months. We discussed treatment options and he elected to proceed with the above understanding the risks of bleeding, infection, damage, surrounding nerves and blood vessels, persistent pain symptoms, loss of function, failure to heal, need for further surgery, need for further treatment. He signed informed consent. DESCRIPTION OF PROCEDURE:  The patient was seen and identified in the preanesthesia care unit. The operative site was marked. Preoperative questions invited and answered. The patient was evaluated by the Anesthesia team and brought to the operative suite on a stretcher, transferred to the operating room table. General anesthesia was induced. He was prepped and draped in the usual fashion. A time-out was undertaken confirming the appropriate site, side, and procedure. The arm was elevated for 120 seconds and a well-padded tourniquet inflated to 250 mmHg. Next, the patient's prior surgical incision was opened. There was dense scar tissue in the posterior aspect of the elbow and no obvious purulence deep to this incision. However, upon dissecting through the scar plane laterally to the area of the draining sinus, there was abundant particulate matter which is about 20 mL at least.  This was evacuated as much as possible using a rongeur and direct pressure, some of this was expressed out through the sinus. The skin sinus was excised sharply so that only normal skin remained. The defect was approximately 3 cm x 2 cm after doing so. Any abnormal tissue was removed. Cultures and specimen was sent. A pulse lavage was used with 4 mL of normal saline. Tourniquet let down. Hemostasis was obtained with direct pressure and Bovie cautery. Deep subcutaneous tissue closed with 2-0 PDS except for over the area of the sinus which was left open.   Then, an incisional VAC was placed over the 13 cm incision connecting to avoid over the lateral elbow, VAC was applied with excellent suction and posterior elbow splint was applied. The patient was allowed to emerge from anesthesia and did so uneventfully. After cultures were taken, the patient did receive vancomycin. POSTOPERATIVE PLAN:  Admit for IV antibiotics. Await culture results. Will need placement for wound VAC and likely PICC line as well. Await pathology results.       Lula Mayes MD      [id number]  D:  11/04/2022 16:33  T:  11/04/2022 23:07  JOB #:  [id number]

## 2022-11-05 NOTE — PROGRESS NOTES
Ortho Daily Progress Note    11/5/2022  12:14 PM    POD:  1 Day Post-Op  S/P:  Procedure(s):  LEFT OLECRANON BURSECTOMY, LEFT ELBOW OPEN BIOPSY WITH SOFT TISSUE PROCEDRES AS INDICATED ([facility])    Afebrile/VSS  Doing well without complaints of nausea  Pain well controlled  Calves soft/NTTP Bilaterally  Lab Results   Component Value Date/Time    HGB 15.7 05/25/2022 03:36 PM     Incision OK; no drainage  Dressings clean and dry  Moving LE well  Neurocirculatory exam WNL.   Vac output - bloody - 100cc    PLAN:  Tolerating regular diet  DVT prophylaxis- BID  IV Vanc - ID consult pending  WBAT with PT-mobilization  Pain Control  Plan to D/C once cleared by ID  HH IV abx per ID - no note yet    Bonifacio Cordova PA-C

## 2022-11-05 NOTE — PROGRESS NOTES
11/5/22  10:55 AM    Care Management Initial Evaluation:    CM reviewed EMR and met with patient in the room to complete the initial evaluation. Confirmed charted demographics. Reason for Admission:    Olecranon bursitis of left elbow                     RUR Score:    3%  Level: Low    Payor: [employer] Medicare Complete                 Plan for utilizing home health:     No home health history     PCP: First and Last name:  Anthony Haque MD     Name of Practice: [address] [place] [address]   Are you a current patient: Yes/No: Yes   Approximate date of last visit: 5/25/22   Can you participate in a virtual visit with your PCP:                     Current Advanced Directive/Advance Care Plan: Full Code      Healthcare Decision Maker:   Click here to complete [facility] including selection of the Healthcare Decision Maker Relationship (ie \"Primary\")             Primary Decision MakerArmond Nam - Spouse - [phone]                  Transition of Care Plan:                    Patient resides with spouse in a one level apartment with 4 entry steps. He is normally independent with all ADL's and drives. He has a CPAP machine for sleep apnea but no other DME. [facility] [address] for prescription needs. 1). Patient admitted for surgery under ortho  2). ID consulted for abx recs  3). Family will transport at dc  4). CM following for possible home IV abx- patient is aware and spouse will be able to be taught if necessary. [id number] Management Interventions  PCP Verified by CM: Yes  Last Visit to PCP: 05/25/22  Mode of Transport at Discharge:  Other (see comment) (Spouse will transport at [facility])  Transition of Care Consult (CM Consult): Discharge Planning  Discharge Durable Medical Equipment: No  Physical Therapy Consult: No  Occupational Therapy Consult: No  Speech Therapy Consult: No  Support Systems: Spouse/Significant Other  Confirm Follow Up Transport: Family  Discharge Location  Patient Expects to be Discharged to[de-identified] Unable to determine at this time

## 2022-11-05 NOTE — PROGRESS NOTES
[address] [address] Pharmacy Dosing Services: Antimicrobial Stewardship Daily Doc  Consult for antibiotic dosing of Vancomycin by Dipika Melara [facility]  Indication: Left elbow olecranon bursectomy with possible septic olecranon bursitis  Day of Therapy: 1    Ht Readings from Last 1 Encounters:   11/04/22 182.9 cm (72\")        Wt Readings from Last 1 Encounters:   11/04/22 124.4 kg (274 lb 4 oz)      Vancomycin therapy:  Maintenance dose: Vancomycin 1250 mg IV every 12 hours   Pt received Vancomycin 1500 mg IV q12hr x2 doses surgical prophylaxis  Dose calculated to approximate a           a. Target AUC/DORON of 400-600          b. Trough of N/A   Last level: will order soon  Plan: Continue same  Dose administration notes:   Doses given appropriately as scheduled    Other Antimicrobial   (not dosed by pharmacist) Rocephin 2gm IV q24hr   Cultures 11/4: Left elbow: GNR, gram neg coccobacilli pending  Anaerobes: pending  Fungus pending   Serum Creatinine Lab Results   Component Value Date/Time    Creatinine 1.13 05/25/2022 03:36 PM         Creatinine Clearance Estimated Creatinine Clearance: 86.4 mL/min (by C-G formula based on SCr of 1.13 mg/dL). Temp Temp: 98.3 °F (36.8 °C)       WBC Lab Results   Component Value Date/Time    WBC 7.3 05/25/2022 03:36 PM        Procalcitonin No results found for: PCT     For Antifungals, Metronidazole and Nafcillin: Lab Results   Component Value Date/Time    ALT (SGPT) 32 05/25/2022 03:36 PM    AST (SGOT) 28 05/25/2022 03:36 PM    Alk.  phosphatase 85 05/25/2022 03:36 PM    Bilirubin, total 0.4 05/25/2022 03:36 PM        Pharmacist Anuj Keene

## 2022-11-06 LAB — CREAT SERPL-MCNC: 1.13 MG/DL (ref 0.7–1.3)

## 2022-11-06 PROCEDURE — 36415 COLL VENOUS BLD VENIPUNCTURE: CPT

## 2022-11-06 PROCEDURE — 74011250637 HC RX REV CODE- 250/637: Performed by: PHYSICIAN ASSISTANT

## 2022-11-06 PROCEDURE — 74011000250 HC RX REV CODE- 250: Performed by: PHYSICIAN ASSISTANT

## 2022-11-06 PROCEDURE — 65270000029 HC RM PRIVATE

## 2022-11-06 PROCEDURE — 94761 N-INVAS EAR/PLS OXIMETRY MLT: CPT

## 2022-11-06 PROCEDURE — 74011250637 HC RX REV CODE- 250/637: Performed by: ORTHOPAEDIC SURGERY

## 2022-11-06 PROCEDURE — 94640 AIRWAY INHALATION TREATMENT: CPT

## 2022-11-06 PROCEDURE — 82565 ASSAY OF CREATININE: CPT

## 2022-11-06 PROCEDURE — 74011250636 HC RX REV CODE- 250/636: Performed by: PHYSICIAN ASSISTANT

## 2022-11-06 PROCEDURE — 74011250637 HC RX REV CODE- 250/637: Performed by: NURSE PRACTITIONER

## 2022-11-06 RX ORDER — HYDROMORPHONE HYDROCHLORIDE 1 MG/ML
1 INJECTION, SOLUTION INTRAMUSCULAR; INTRAVENOUS; SUBCUTANEOUS
Status: DISCONTINUED | OUTPATIENT
Start: 2022-11-06 | End: 2022-11-08 | Stop reason: HOSPADM

## 2022-11-06 RX ADMIN — ASPIRIN 325 MG: 325 TABLET, COATED ORAL at 21:52

## 2022-11-06 RX ADMIN — ACETAMINOPHEN 1000 MG: 500 TABLET ORAL at 18:58

## 2022-11-06 RX ADMIN — POLYETHYLENE GLYCOL 3350 17 G: 17 POWDER, FOR SOLUTION ORAL at 08:39

## 2022-11-06 RX ADMIN — BUDESONIDE INHALATION SUSPENSION: 0.5 SUSPENSION RESPIRATORY (INHALATION) at 09:27

## 2022-11-06 RX ADMIN — FAMOTIDINE 20 MG: 20 TABLET, FILM COATED ORAL at 08:39

## 2022-11-06 RX ADMIN — OXYCODONE 10 MG: 5 TABLET ORAL at 12:04

## 2022-11-06 RX ADMIN — ACETAMINOPHEN 1000 MG: 500 TABLET ORAL at 12:04

## 2022-11-06 RX ADMIN — ASPIRIN 325 MG: 325 TABLET, COATED ORAL at 08:39

## 2022-11-06 RX ADMIN — ACETAMINOPHEN 1000 MG: 500 TABLET ORAL at 06:16

## 2022-11-06 RX ADMIN — LOSARTAN POTASSIUM 100 MG: 25 TABLET, FILM COATED ORAL at 08:39

## 2022-11-06 RX ADMIN — HYDROMORPHONE HYDROCHLORIDE 1 MG: 1 INJECTION, SOLUTION INTRAMUSCULAR; INTRAVENOUS; SUBCUTANEOUS at 16:52

## 2022-11-06 RX ADMIN — CEFTRIAXONE 2 G: 2 INJECTION, POWDER, FOR SOLUTION INTRAMUSCULAR; INTRAVENOUS at 16:52

## 2022-11-06 RX ADMIN — AMLODIPINE BESYLATE 10 MG: 5 TABLET ORAL at 08:39

## 2022-11-06 RX ADMIN — Medication 10 ML: at 12:04

## 2022-11-06 RX ADMIN — SENNOSIDES AND DOCUSATE SODIUM 1 TABLET: 50; 8.6 TABLET ORAL at 18:58

## 2022-11-06 RX ADMIN — VANCOMYCIN HYDROCHLORIDE 1250 MG: 1.25 INJECTION, POWDER, LYOPHILIZED, FOR SOLUTION INTRAVENOUS at 08:40

## 2022-11-06 RX ADMIN — Medication 10 ML: at 21:52

## 2022-11-06 RX ADMIN — GABAPENTIN 800 MG: 300 CAPSULE ORAL at 08:39

## 2022-11-06 RX ADMIN — GABAPENTIN 800 MG: 300 CAPSULE ORAL at 21:52

## 2022-11-06 RX ADMIN — FAMOTIDINE 20 MG: 20 TABLET, FILM COATED ORAL at 18:58

## 2022-11-06 RX ADMIN — Medication 10 ML: at 06:17

## 2022-11-06 RX ADMIN — OXYCODONE 10 MG: 5 TABLET ORAL at 14:58

## 2022-11-06 RX ADMIN — GABAPENTIN 800 MG: 300 CAPSULE ORAL at 14:58

## 2022-11-06 RX ADMIN — SENNOSIDES AND DOCUSATE SODIUM 1 TABLET: 50; 8.6 TABLET ORAL at 08:40

## 2022-11-06 RX ADMIN — OXYCODONE 10 MG: 5 TABLET ORAL at 18:58

## 2022-11-06 RX ADMIN — VANCOMYCIN HYDROCHLORIDE 1250 MG: 1.25 INJECTION, POWDER, LYOPHILIZED, FOR SOLUTION INTRAVENOUS at 21:52

## 2022-11-06 RX ADMIN — BUDESONIDE INHALATION SUSPENSION: 0.5 SUSPENSION RESPIRATORY (INHALATION) at 20:35

## 2022-11-06 RX ADMIN — OXYCODONE 10 MG: 5 TABLET ORAL at 08:40

## 2022-11-06 NOTE — PROGRESS NOTES
Problem: Falls - Risk of  Goal: *Absence of Falls  Description: Document Ankit Sureshs Fall Risk and appropriate interventions in the flowsheet.   Outcome: Progressing Towards Goal  Note: Fall Risk Interventions:  Mobility Interventions: Patient to call before getting OOB         Medication Interventions: Patient to call before getting OOB, Teach patient to arise slowly                   Problem: Patient Education: Go to Patient Education Activity  Goal: Patient/Family Education  Outcome: Progressing Towards Goal

## 2022-11-06 NOTE — PROGRESS NOTES
Ortho Daily Progress Note    11/6/2022  7:23 AM    POD:  2 Days Post-Op  S/P:  Procedure(s):  LEFT OLECRANON BURSECTOMY, LEFT ELBOW OPEN BIOPSY WITH SOFT TISSUE PROCEDRES AS INDICATED ([facility])    Afebrile/VSS  Doing well without complaints of nausea  Pain well controlled  Calves soft/NTTP Bilaterally  Lab Results   Component Value Date/Time    HGB 15.7 05/25/2022 03:36 PM       Results       Procedure Component Value Units Date/Time    CULTURE, ANAEROBIC [[id number]] Collected: 11/04/22 1443    Order Status: No result Updated: 11/04/22 2259    CULTURE, FUNGUS [[id number]] Collected: 11/04/22 1443    Order Status: No result Updated: 11/04/22 2259    CULTURE, TISSUE Rosario Hacking STAIN [[id number]]  (Abnormal) Collected: 11/04/22 1443    Order Status: Completed Specimen: Surgical Specimen Updated: 11/05/22 1515     Special Requests: NO SPECIAL REQUESTS        GRAM STAIN       3+ GRAM POSITIVE COCCI IN PAIRS AND CHAINS           Culture result:       MODERATE GRAM NEGATIVE RODS                  CHECKING FOR POSSIBLE GRAM NEGATIVE COCCOBACILLI                  Incision OK; drain output between 150-200  Dressings clean and dry  Moving LE well  Neurocirculatory exam WNL.     PLAN:  Tolerating regular diet  DVT prophylaxis- BID  WBAT with PT-mobilization  Pain Control  Plan to D/C once PICC line and ID plan are in place  Continue empric abx coverage with rocephin and vancomycin      Rashmi Majano PA-C

## 2022-11-07 LAB
BACTERIA SPEC CULT: ABNORMAL
CREAT SERPL-MCNC: 1.06 MG/DL (ref 0.7–1.3)
GRAM STN SPEC: ABNORMAL
SERVICE CMNT-IMP: ABNORMAL
SERVICE CMNT-IMP: ABNORMAL
VANCOMYCIN SERPL-MCNC: 20.1 UG/ML

## 2022-11-07 PROCEDURE — 74011250637 HC RX REV CODE- 250/637: Performed by: ORTHOPAEDIC SURGERY

## 2022-11-07 PROCEDURE — 74011250637 HC RX REV CODE- 250/637: Performed by: INTERNAL MEDICINE

## 2022-11-07 PROCEDURE — 94640 AIRWAY INHALATION TREATMENT: CPT

## 2022-11-07 PROCEDURE — 82565 ASSAY OF CREATININE: CPT

## 2022-11-07 PROCEDURE — 99223 1ST HOSP IP/OBS HIGH 75: CPT | Performed by: INTERNAL MEDICINE

## 2022-11-07 PROCEDURE — 74011000250 HC RX REV CODE- 250: Performed by: PHYSICIAN ASSISTANT

## 2022-11-07 PROCEDURE — 80202 ASSAY OF VANCOMYCIN: CPT

## 2022-11-07 PROCEDURE — 36573 INSJ PICC RS&I 5 YR+: CPT | Performed by: INTERNAL MEDICINE

## 2022-11-07 PROCEDURE — 74011250637 HC RX REV CODE- 250/637: Performed by: NURSE PRACTITIONER

## 2022-11-07 PROCEDURE — 36415 COLL VENOUS BLD VENIPUNCTURE: CPT

## 2022-11-07 PROCEDURE — 65270000029 HC RM PRIVATE

## 2022-11-07 PROCEDURE — 74011250637 HC RX REV CODE- 250/637: Performed by: PHYSICIAN ASSISTANT

## 2022-11-07 PROCEDURE — 94761 N-INVAS EAR/PLS OXIMETRY MLT: CPT

## 2022-11-07 PROCEDURE — 74011250636 HC RX REV CODE- 250/636: Performed by: PHYSICIAN ASSISTANT

## 2022-11-07 RX ORDER — METRONIDAZOLE 250 MG/1
500 TABLET ORAL EVERY 12 HOURS
Status: DISCONTINUED | OUTPATIENT
Start: 2022-11-07 | End: 2022-11-08

## 2022-11-07 RX ADMIN — GABAPENTIN 800 MG: 300 CAPSULE ORAL at 20:45

## 2022-11-07 RX ADMIN — ASPIRIN 325 MG: 325 TABLET, COATED ORAL at 20:45

## 2022-11-07 RX ADMIN — BUDESONIDE INHALATION SUSPENSION: 0.5 SUSPENSION RESPIRATORY (INHALATION) at 21:56

## 2022-11-07 RX ADMIN — FAMOTIDINE 20 MG: 20 TABLET, FILM COATED ORAL at 09:16

## 2022-11-07 RX ADMIN — POLYETHYLENE GLYCOL 3350 17 G: 17 POWDER, FOR SOLUTION ORAL at 09:16

## 2022-11-07 RX ADMIN — Medication 10 ML: at 13:06

## 2022-11-07 RX ADMIN — METRONIDAZOLE 500 MG: 250 TABLET ORAL at 20:45

## 2022-11-07 RX ADMIN — HYDROMORPHONE HYDROCHLORIDE 1 MG: 1 INJECTION, SOLUTION INTRAMUSCULAR; INTRAVENOUS; SUBCUTANEOUS at 14:39

## 2022-11-07 RX ADMIN — GABAPENTIN 800 MG: 300 CAPSULE ORAL at 09:16

## 2022-11-07 RX ADMIN — AMLODIPINE BESYLATE 10 MG: 5 TABLET ORAL at 09:16

## 2022-11-07 RX ADMIN — SENNOSIDES AND DOCUSATE SODIUM 1 TABLET: 50; 8.6 TABLET ORAL at 09:16

## 2022-11-07 RX ADMIN — SENNOSIDES AND DOCUSATE SODIUM 1 TABLET: 50; 8.6 TABLET ORAL at 18:38

## 2022-11-07 RX ADMIN — CEFTRIAXONE 2 G: 2 INJECTION, POWDER, FOR SOLUTION INTRAMUSCULAR; INTRAVENOUS at 16:27

## 2022-11-07 RX ADMIN — Medication 10 ML: at 05:19

## 2022-11-07 RX ADMIN — ACETAMINOPHEN 1000 MG: 500 TABLET ORAL at 18:39

## 2022-11-07 RX ADMIN — BUDESONIDE INHALATION SUSPENSION: 0.5 SUSPENSION RESPIRATORY (INHALATION) at 07:11

## 2022-11-07 RX ADMIN — ACETAMINOPHEN 1000 MG: 500 TABLET ORAL at 06:41

## 2022-11-07 RX ADMIN — VANCOMYCIN HYDROCHLORIDE 1250 MG: 1.25 INJECTION, POWDER, LYOPHILIZED, FOR SOLUTION INTRAVENOUS at 09:16

## 2022-11-07 RX ADMIN — ACETAMINOPHEN 1000 MG: 500 TABLET ORAL at 00:34

## 2022-11-07 RX ADMIN — Medication 10 ML: at 20:45

## 2022-11-07 RX ADMIN — ACETAMINOPHEN 1000 MG: 500 TABLET ORAL at 13:06

## 2022-11-07 RX ADMIN — OXYCODONE 10 MG: 5 TABLET ORAL at 05:18

## 2022-11-07 RX ADMIN — LOSARTAN POTASSIUM 100 MG: 25 TABLET, FILM COATED ORAL at 09:16

## 2022-11-07 RX ADMIN — OXYCODONE 10 MG: 5 TABLET ORAL at 10:23

## 2022-11-07 RX ADMIN — GABAPENTIN 800 MG: 300 CAPSULE ORAL at 16:27

## 2022-11-07 RX ADMIN — FAMOTIDINE 20 MG: 20 TABLET, FILM COATED ORAL at 18:38

## 2022-11-07 RX ADMIN — ASPIRIN 325 MG: 325 TABLET, COATED ORAL at 09:16

## 2022-11-07 NOTE — PROGRESS NOTES
Post Discharge PICC and Antibiotic Orders    1. Diagnosis: Left olecranon bursitis. Polymicrobial  2. Antibiotic: Invanz 1 g IV daily through 11/24/2022  3. Routine PICC/ Duarte/ Portacath Care including PRN catheter flow management  4. Weekly labs:   _x__CBC/diff/platelets   _x__BUN/Creatinine   ___CPK   _x__AST/TotalBilirubin/AlkalinePhosphatase   _x__CRP  5. Fax lab to [phone]  Call Critical Lab Values to [phone]  6. May send to IR for line evaluation or replacement [phone]  7. Home Health to pull PIC line at end of therapy or send to IR for Duarte removal  8.   Allergies:  No Known Allergies      Myles Quick,

## 2022-11-07 NOTE — PROGRESS NOTES
ASSESSMENT     Rsata Truong is a 79 y.o. male, who is POD# 3 s/p left elbow olecranon bursectomy and open biopsy. Patient doing well. PLAN    1. Pain control. 2. Mobilize with PT / OT. Weight bearing Status : NW[place]BAR PINON  3. DVT Prophylaxis : Mechanical /  BID  4. Continue wound VAC. Reinforce seal as needed. Change cannister as needed. 5. ID Consult : Awaiting finalized intra-operative cultures. Continue empiric antibiotics. Appreciate recommendations for appropriate antibiotics. 6. Disposition : Case Management for planning. Will need home wound VAC. Likely discharge to home with IV antibiotics. Ely Love PA-C  Office : [phone]  Cell: [phone]    Subjective:     Resting, complaining of appropriate pain. States that current pain regimen doing well as long as he \"stays on top of the pain\". No numbness or tingling. Objective:   Patient Vitals for the past 12 hrs:   BP Temp Pulse Resp SpO2   11/07/22 0711 -- -- -- -- 97 %   11/07/22 0337 136/75 98 °F (36.7 °C) 68 20 98 %   11/07/22 0111 (!) 146/79 98.1 °F (36.7 °C) 67 18 97 %   11/06/22 2035 -- -- -- -- 96 %   11/06/22 2025 128/62 98.1 °F (36.7 °C) 68 16 98 %       GENERAL: No acute distress. MUSCULOSKELETAL EXAM  Left upper extremity -   Dressing clean, dry, and intact. Wound VAC seal in place with 200-250 cc serosanguinous fluid in cannister. Fingers warm and well perfused. Sensation grossly in tact to light touch over 1st dorsal web space, tips of long and small fingers. +Thumb up, , finger extension, finger spread, OK.     LABS :  Recent Labs     11/07/22  0314   CREA 1.06       No results found for: SDES    Lab Results   Component Value Date/Time    Culture result: CHECKING FOR POSSIBLE ANAEROBES 11/04/2022 01:43 PM    Culture result: MODERATE GRAM NEGATIVE RODS (A) 11/04/2022 01:43 PM    Culture result: (A) 11/04/2022 01:43 PM     HEAVY HAEMOPHILUS SPECIES RESEMBLING HAEMOPHILUS HAEMOLYTICUS  BETA LACTAMASE NEGATIVE    Culture result: HEAVY DIPHTHEROIDS (A) 11/04/2022 01:43 PM

## 2022-11-07 NOTE — PROGRESS NOTES
Vascular access team:  acknowledged order for PICC line for IV Invanz through 11/24/22. Reached out to [employer], we are waiting for the wound vac for the patient, and  he will be discharged tomorrow. Plan to place PICC prior to discharge.     Liliana Fuentes RN

## 2022-11-07 NOTE — PROGRESS NOTES
11/7/20223:57 PM Wound vac remains in process with [facility], update received via [employer]. Noted iv abx order for home entered by ID. CM sent to [facility] via [employer]. [facility] confirmed pt does not have a copay for meds. CM also sent home iv abx orders to [facility]     11/7/2022 1:30 PM Wound vac form received and sent to [facility] with supporting clinicals  via [employer]. CM confirmed with Ortho PA, pt will not need home health for wound vac changes, Dr. Jeramie Gupta will do this in office. CM clarified with [facility], home health will only be for home health iv abx and picc care. CM called and lvm with Brandi at [facility] regarding pt's wound vac form. CM will follow. 11/7/2022 11:54 AM Spoke with pt and pt's wife at bedside regarding need for home health RN for wound vac and iv abx at home. Pt and pt's wife were understanding. CM reviewed home health agencies, pt and pt's wife were agreeable to [facility] and home iv infusion through [facility] [address] form completed. CM will follow. 11/7/2022 9:41 AM Noted pt will need a wound vac and possible iv abx at discharge. CM requested completed wound vac form from Ely Smith. Once received, CM will send referral to [facility]. CM has sent home health referrals to multiple agencies to check availability with pt's insurance and staffing in pt's home zipcode. Referrals sent to:  [facility]- denied due to insurance   [facility] Chang- denied due to insurance  [facility]- accepted   At [facility]- accepted   [facility] - denied due to staffing  [facility] iv abx referral sent to [facility] via [employer], pending ID recs. Care Management Progress Note  POD# 3 s/p left elbow olecranon bursectomy and open biopsy.       RUR:  2%  Risk Level: [x]Low []Moderate []High  Value-based purchasing: [x] Yes [] No  Bundle patient: [] Yes [x] No   Specify:     Transition of care plan:  Ongoing medical management, ID consult pending for abx recs   Pt will need wound vac at discharge  Home with family   Home health RN through [facility]   Outpatient follow-up.   Pt's family to transport

## 2022-11-07 NOTE — PROGRESS NOTES
[facility] Pharmacy Dosing Services: Antimicrobial Stewardship Daily Doc  Consult for antibiotic dosing of Vancomycin by Erin AGUILAR. ID consult pending  Indication: Left elbow olecranon bursectomy with possible septic olecranon bursitis  Day of Therapy: 3    Ht Readings from Last 1 Encounters:   11/04/22 182.9 cm (72\")        Wt Readings from Last 1 Encounters:   11/04/22 124.4 kg (274 lb 4 oz)      Vancomycin therapy:  Maintenance dose: Vancomycin 1250 mg IV every 12 hours   Pt received Vancomycin 1500 mg IV q12hr x2 doses surgical prophylaxis  Dose calculated to approximate a           a. Target AUC/DORON of 400-600          b. Trough of N/A   Last level: random level of 20.1 (0314 on 11/7)  Plan: Keep same maintenance dose for estimated AUC of 529; trough of 17.8 based on Bayesian dosing  - Can increase dose if patient clinically decompensates or cultures reveal MRSA growth  - Reviewed with ortho PA who is following up with ID - recommend addition of metronidazole for anaerobic coverage  Dose administration notes: Doses given appropriately as scheduled    Other Antimicrobial   (not dosed by pharmacist) Rocephin 2gm IV q24hr   Cultures 11/4: Left elbow: GNR, Haemophillus species pending  Anaerobes: Heavy anaerobic GNR  Fungus pending   Serum Creatinine Lab Results   Component Value Date/Time    Creatinine 1.06 11/07/2022 03:14 AM         Creatinine Clearance Estimated Creatinine Clearance: 92.1 mL/min (based on SCr of 1.06 mg/dL). Temp Temp: 98.4 °F (36.9 °C)       WBC Lab Results   Component Value Date/Time    WBC 7.3 05/25/2022 03:36 PM        Procalcitonin No results found for: PCT     For Antifungals, Metronidazole and Nafcillin: Lab Results   Component Value Date/Time    ALT (SGPT) 32 05/25/2022 03:36 PM    AST (SGOT) 28 05/25/2022 03:36 PM    Alk.  phosphatase 85 05/25/2022 03:36 PM    Bilirubin, total 0.4 05/25/2022 03:36 PM        Pharmacist Beatrice Dick

## 2022-11-07 NOTE — CONSULTS
Infectious Disease Consult    Impression/Plan   Infected left elbow chronic wound/bursitis in the setting of history of squamous cell carcinoma involving the left elbow. Status post I&D and olecranon bursectomy 11/4/2022. Per operative note the infected fluid did not invade the bone or underlying fascia. Cultures reveal a polymicrobial infection including Citrobacter, haemophilus, diphtheroids, and anaerobes. Patient is currently on vancomycin and ceftriaxone. Discussed with orthopedic surgery. Anticipate discharge on ertapenem x2 weeks. PICC line ordered. IV antibiotic orders are in the chart  Obesity. BMI is 37   Anti-infectives:   Vancomycin  Ceftriaxone    Subjective:   Date of Consultation:  November 7, 2022  Date of Admission: 11/4/2022   Referring Physician:     Patient is a 79 y.o. male with a past medical history significant for hypertension, obstructive sleep apnea, and obesity who is being seen for left elbow infection  Per orthopedic surgery surgical note: \"The patient is a 80-year-old gentleman who has a complicated history of left elbow, in 06/2022 he underwent ulnar neurolysis at the elbow for profound ulnar neuropathy in the setting of a mass and swelling of the elbow, which is presumed to be associated with bursitis. He does have a history of gout as well; however, this was found to be consistent with bursitis though also with evidence of squamous cell carcinoma. He has had persistent difficulty with wound healing and presented to the office with persistent drainage which he reports has been present for at least 2 months\"    Patient was taken to the OR 11/4 where he underwent debridement with bursectomy and wound VAC application. Surgical cultures are growing multiple organisms including Citrobacter, haemophilus, diphtheroids, and anaerobes. He is currently on vancomycin and ceftriaxone.   The infectious diseases service has been asked to assist with antibiotic management    Patient Active Problem List   Diagnosis Code    Severe obesity ([facility] [address] E66.01    Peripheral vascular disease (HCC) I73.9    Pulmonary emphysema (HCC) J43.9    DDD (degenerative disc disease), cervical M50.30    Essential hypertension I10    HELGA (obstructive sleep apnea) G47.33    Abnormal liver enzymes R74.8    Olecranon bursitis of left elbow M70.22     Past Medical History:   Diagnosis Date    Chronic obstructive pulmonary disease (HCC)     GERD (gastroesophageal reflux disease)     Hypertension     HELGA (obstructive sleep apnea) 12/16/2019      Family History   Problem Relation Age of Onset    Hypertension Mother       Social History     Tobacco Use    Smoking status: Former     Packs/day: 0.25     Types: Cigarettes    Smokeless tobacco: Never    Tobacco comments:     3 cigarettes daily   Substance Use Topics    Alcohol use: Not Currently     Alcohol/week: 6.0 standard drinks     Types: 6 Cans of beer per week     Past Surgical History:   Procedure Laterality Date    HX UROLOGICAL        Prior to Admission medications    Medication Sig Start Date End Date Taking? Authorizing Provider   tiotropium (Spiriva with HandiHaler) 18 mcg inhalation capsule INHALE THE CONTENTS OF 1 CAPSULE VIA INHALATION DEVICE EVERY DAY 10/20/22  Yes Graciela Meek MD   amLODIPine (NORVASC) 10 mg tablet TAKE 1 TABLET BY MOUTH EVERY DAY 10/17/22  Yes Graciela Meek MD   Breo Ellipta 100-25 mcg/dose inhaler INHALE 1 PUFF BY MOUTH DAILY 10/17/22  Yes Graciela Meek MD   losartan (COZAAR) 100 mg tablet TAKE 1 TABLET BY MOUTH EVERY DAY 9/21/22  Yes Graciela Meek MD   gabapentin (NEURONTIN) 800 mg tablet TAKE 1 TABLET BY MOUTH THREE TIMES DAILY 7/11/22  Yes Graciela Meek MD   aspirin delayed-release 81 mg tablet TAKE 1 TABLET BY MOUTH EVERY DAY  Patient taking differently: Do not crush, break or chew. Swallow whole.  3/9/20  Yes Graciela Meek MD   diclofenac EC (VOLTAREN) 75 mg EC tablet TAKE 1 TABLET BY MOUTH TWICE DAILY FOR 14 DAYS  Patient not taking: Reported on 2022   Wiley De Oliveira MD   HYDROcodone-acetaminophen [facility]) 5-325 mg per tablet Take 1 Tab by mouth every six (6) hours as needed. Patient not taking: No sig reported 4/10/20   Provider, Historical   cyclobenzaprine (FLEXERIL) 10 mg tablet Take 10 mg by mouth three (3) times daily as needed. Patient not taking: No sig reported 3/6/20   Provider, Historical     No Known Allergies     Review of Systems:  A comprehensive review of systems was negative except for that written in the History of Present Illness. Objective:   Blood pressure (!) 149/91, pulse 65, temperature 99.3 °F (37.4 °C), resp. rate 20, height 6' (1.829 m), weight 274 lb 4 oz (124.4 kg), SpO2 100 %. Temp (24hrs), Av.3 °F (36.8 °C), Min:97.9 °F (36.6 °C), Max:99.3 °F (37.4 °C)       Exam:    General:  Alert, cooperative, well noursished, well developed, appears stated age   Eyes:  Sclera anicteric.    Mouth/Throat: Mucous membranes normal   Neck: Supple   Lungs:   No distress   CV:     Abdomen:   Obese   Extremities: No edema   Skin: No acute rash on exposed skin   Lymph nodes:    Musculoskeletal: Moves all   Lines/Devices:  Wound VAC on left elbow   Psych: Alert and oriented, normal mood affect given the setting       Data Review:   Recent Results (from the past 24 hour(s))   CREATININE    Collection Time: 22  3:14 AM   Result Value Ref Range    Creatinine 1.06 0.70 - 1.30 MG/DL    eGFR >60 >60 ml/min/1.73m2   VANCOMYCIN, RANDOM    Collection Time: 22  3:14 AM   Result Value Ref Range    Vancomycin, random 20.1 UG/ML        Microbiology:      Studies:      Signed By: Janell Lauren DO     2022

## 2022-11-07 NOTE — PROGRESS NOTES
Patients daughter came to the nurses station stating that her father was in a lot of pain and was requesting pain medications. Patient's daughter also stated she was concerned that patient was not getting his antibiotics because there was a bag of fluid hanging on the IV pole and it was still full. I went back to the room with the daughter to assess patient and education/explain his antibiotics to her. The full bag she was referring to was the flush bag of normal saline. I then showed her the empty bag of vancomycin that was still attached to the IV pole  and explained the process how the antibiotic infuses and then the saline just flushes with a small amount. I then assessed the patient and his pain level, which he stated was 10/10. Patient was not due yet for oral pain medications. Order received for IV medication at this time for break through pain. Explained to patient it was to early for his oral medication and we could do a dose of the IV if he could not wait until the oral was due again. He stated he would like the IV medication and could not wait for the next oral dose, wife and daughter were not in the room at this time, they had left to go get food. About 45 minutes later when reassessing patients pain he stated it had much improved with the IV medication and was comfortable at this time, however would like to stay on top of his medication in hopes to keep the pain controlled. Patient is Alert and oriented x 4, slightly sleeping but not drowsy after the medication. Patients wife aggressively states that he is not to get the IV medication again. Patient then gets loud with wife stating \"you cant tell them not to give me medication if I'm hurting\", they both began to argue with each other and daughter intervened telling them to stop arguing while I was in the room.  I tried to educated the wife about his medications and how the oral wasn't due yet and he, the patient who is completely alert and oriented asked for the IV medication, while you could see he was in physical pain. She then continued to argue with him, so I asked the patient if there was anything else I could get him right now and he said no. I then informed him I would be back when his next oral medications were due but to call me if he needed assistance prior. Patient stated understanding. Call bell within reach.

## 2022-11-08 ENCOUNTER — APPOINTMENT (OUTPATIENT)
Dept: GENERAL RADIOLOGY | Age: 67
DRG: 501 | End: 2022-11-08
Attending: INTERNAL MEDICINE
Payer: MEDICARE

## 2022-11-08 VITALS
RESPIRATION RATE: 18 BRPM | SYSTOLIC BLOOD PRESSURE: 154 MMHG | WEIGHT: 274.25 LBS | OXYGEN SATURATION: 97 % | DIASTOLIC BLOOD PRESSURE: 92 MMHG | TEMPERATURE: 98.2 F | HEART RATE: 71 BPM | BODY MASS INDEX: 37.15 KG/M2 | HEIGHT: 72 IN

## 2022-11-08 PROCEDURE — 74011000250 HC RX REV CODE- 250: Performed by: PHYSICIAN ASSISTANT

## 2022-11-08 PROCEDURE — 74011250637 HC RX REV CODE- 250/637: Performed by: INTERNAL MEDICINE

## 2022-11-08 PROCEDURE — C1751 CATH, INF, PER/CENT/MIDLINE: HCPCS

## 2022-11-08 PROCEDURE — 74011000258 HC RX REV CODE- 258: Performed by: INTERNAL MEDICINE

## 2022-11-08 PROCEDURE — 2709999900 HC NON-CHARGEABLE SUPPLY

## 2022-11-08 PROCEDURE — 77030018717 HC DRSG GRNUFM KCON -B

## 2022-11-08 PROCEDURE — 02HV33Z INSERTION OF INFUSION DEVICE INTO SUPERIOR VENA CAVA, PERCUTANEOUS APPROACH: ICD-10-PCS | Performed by: ORTHOPAEDIC SURGERY

## 2022-11-08 PROCEDURE — 74011250636 HC RX REV CODE- 250/636: Performed by: PHYSICIAN ASSISTANT

## 2022-11-08 PROCEDURE — 74011250637 HC RX REV CODE- 250/637: Performed by: NURSE PRACTITIONER

## 2022-11-08 PROCEDURE — 74011250637 HC RX REV CODE- 250/637: Performed by: PHYSICIAN ASSISTANT

## 2022-11-08 PROCEDURE — 99232 SBSQ HOSP IP/OBS MODERATE 35: CPT | Performed by: INTERNAL MEDICINE

## 2022-11-08 PROCEDURE — 76937 US GUIDE VASCULAR ACCESS: CPT

## 2022-11-08 PROCEDURE — 74011250636 HC RX REV CODE- 250/636: Performed by: INTERNAL MEDICINE

## 2022-11-08 PROCEDURE — 77030018786 HC NDL GD F/USND BARD -B

## 2022-11-08 PROCEDURE — 74011250637 HC RX REV CODE- 250/637: Performed by: ORTHOPAEDIC SURGERY

## 2022-11-08 RX ORDER — ONDANSETRON 8 MG/1
8 TABLET, ORALLY DISINTEGRATING ORAL
Qty: 20 TABLET | Refills: 0 | Status: SHIPPED | OUTPATIENT
Start: 2022-11-08

## 2022-11-08 RX ORDER — OXYCODONE AND ACETAMINOPHEN 5; 325 MG/1; MG/1
1 TABLET ORAL
Qty: 30 TABLET | Refills: 0 | Status: SHIPPED | OUTPATIENT
Start: 2022-11-08 | End: 2022-11-15 | Stop reason: SDUPTHER

## 2022-11-08 RX ADMIN — ACETAMINOPHEN 1000 MG: 500 TABLET ORAL at 00:42

## 2022-11-08 RX ADMIN — OXYCODONE 5 MG: 5 TABLET ORAL at 05:38

## 2022-11-08 RX ADMIN — OXYCODONE 10 MG: 5 TABLET ORAL at 13:16

## 2022-11-08 RX ADMIN — FAMOTIDINE 20 MG: 20 TABLET, FILM COATED ORAL at 08:59

## 2022-11-08 RX ADMIN — AMLODIPINE BESYLATE 10 MG: 5 TABLET ORAL at 08:18

## 2022-11-08 RX ADMIN — OXYCODONE 10 MG: 5 TABLET ORAL at 08:59

## 2022-11-08 RX ADMIN — METRONIDAZOLE 500 MG: 250 TABLET ORAL at 08:18

## 2022-11-08 RX ADMIN — ASPIRIN 325 MG: 325 TABLET, COATED ORAL at 08:18

## 2022-11-08 RX ADMIN — HYDROMORPHONE HYDROCHLORIDE 1 MG: 1 INJECTION, SOLUTION INTRAMUSCULAR; INTRAVENOUS; SUBCUTANEOUS at 08:14

## 2022-11-08 RX ADMIN — GABAPENTIN 800 MG: 300 CAPSULE ORAL at 08:17

## 2022-11-08 RX ADMIN — Medication 10 ML: at 14:31

## 2022-11-08 RX ADMIN — ACETAMINOPHEN 1000 MG: 500 TABLET ORAL at 13:16

## 2022-11-08 RX ADMIN — ERTAPENEM SODIUM 1 G: 1 INJECTION, POWDER, LYOPHILIZED, FOR SOLUTION INTRAMUSCULAR; INTRAVENOUS at 14:24

## 2022-11-08 RX ADMIN — LOSARTAN POTASSIUM 100 MG: 25 TABLET, FILM COATED ORAL at 08:17

## 2022-11-08 RX ADMIN — POLYETHYLENE GLYCOL 3350 17 G: 17 POWDER, FOR SOLUTION ORAL at 08:14

## 2022-11-08 RX ADMIN — Medication 10 ML: at 05:38

## 2022-11-08 NOTE — PROGRESS NOTES
Medicare pt has received, reviewed, and signed 2nd IM letter informing them of their right to appeal the discharge. Signed copy has been placed on pt bedside chart.   Bradley Smith CMS

## 2022-11-08 NOTE — PROGRESS NOTES
11/8/2022 3:42 PM   Met with pt and pt's wife at bedside after iv abx teaching with Eva with [address] and pt's wife confirmed they feel comfortable with teaching and are ready for discharge. Pt's RN is aware. 11/8/2022 1:46 PM   Discharge clinicals and picc report sent to [place] [address] and [facility] via [address]. Both agencies are aware pt will discharge today, CM has spoken with Twin Dietz at [place] [address] and Cesario Ramso at [facility]. CM will follow. 11/8/2022 1:22 PM   Wound vac has been delivered to pt's bedside for home use. [facility] to complete teaching with pt's iv abx with pt and his wife at [facility] today. CM requested discharge clinicals from Ely Smith. 11/8/2022 12:19 PM   CM spoke with Brandi at [facility], pt's wound vac should be to [facility] by 1:30PM.  CM will follow. 11/8/2022 10:08 AM   Spoke with Cesario Ramos with the picc team who reported pt's picc will be placed around lunch time today. CM called and spoke with Kartik Wilkinson at [place] [address], confirmed pt will discharge home today. Kartik Wilkinson reported she will reach out to pt's wife for timing of teaching and will plan to be to [facility] at [facility] if this timing works with pt's wife. Awaiting to hear timing of wound vac delivery. CM will follow. 11/8/2022 9:41 AM   CM called to Edison Simental at [facility] who confirmed she will relay correct delivery address to . Edison Simental also reported she will notify CM of delivery ETA once available. CM will follow. 11/8/2022  9:11 AM   Anticipating pt to discharge home today. Discharge pending wound vac delivery, picc line placement, and teaching for iv abx. JOHN called to [facility]([phone]), spoke with Sarah Ann regarding pt's wound vac delivery today. Sarah Ann confirmed pt's wound vac has been approved and set for delivery today. CM confirmed the delivery address for pt is [address] [place] [address], [address].   Order form originally had Dr. Elis Hankins office as delivery address. CM requested  call CM with delivery time today. CM will follow. Care Management Progress Note      ICD-10-CM ICD-9-CM    1. Olecranon bursitis of left elbow  M70.22 726.33       2. Infection due to anaerobes  A49.8 041.84       3. Obesity (BMI 30-39. 9)  E66.9 278.00           RUR:  3%  Risk Level: [x]Low []Moderate []High  Value-based purchasing: [] Yes [x] No  Bundle patient: [] Yes [x] No   Specify:     Transition of care plan: Anticipating discharge home today  Picc line to be placed prior to discharge  Pt will need teaching on iv abx prior to discharge  Pt's home wound vac will need to be delivered prior to discharge  Home with family at discharge  Home health nursing arranged though [facility] for iv abx, per Ori WOODRUFF, Dr. Rere Farah office to manage wound vac completely   Home iv abx arranged through [employer], pt's iv abx are covered at 100%  [facility] wound vac has been approved and is pending delivery   Outpatient follow-up. Pt's family to transport      Care Management Interventions  PCP Verified by CM: Yes  Last Visit to PCP: 05/25/22  Mode of Transport at Discharge:  Other (see comment) (Spouse will transport at [facility])  Transition of Care Consult (CM Consult): [address]: No  Reason Outside [place]: Unable to staff case  Discharge Durable Medical Equipment: No  Physical Therapy Consult: No  Occupational Therapy Consult: No  Speech Therapy Consult: No  Support Systems: Spouse/Significant Other  Confirm Follow Up Transport: Family  The Patient and/or Patient Representative was Provided with a Choice of Provider and Agrees with the Discharge Plan?: Yes  Freedom of Choice List was Provided with Basic Dialogue that Supports the Patient's Individualized Plan of Care/Goals, Treatment Preferences and Shares the Quality Data Associated with the Providers?: Yes  Discharge Location  Patient Expects to be Discharged to[de-identified] Home with home health

## 2022-11-08 NOTE — PROGRESS NOTES
ASSESSMENT     Owen Mejia is a 79 y.o. male, who is POD# 4 s/p  left elbow olecranon bursectomy and open biopsy. PLAN    1. Pain control. 2. Mobilize with PT / OT. Weight bearing Status : [place] PIN[place]  3. DVT Prophylaxis : Mechanical /  BID  4. Wound VAC : Home wound VAC ordered. Current wound VAC disconnected. Dressing reapplied. Ortho will reattach new flange to reapply negative pressure until home wound VAC arrives. 5. ID Consult : Cultures reveal polymicrobial infection. ID has recommended Invanz 1 g IV daily through 11/24/2022. PICC line ordered. Appreciate further recommendations. 6. Disposition : Case Management for planning. Discharge to home with PICC line, IV antibiotics, and home wound VAC. Will follow-up outpatient with Dr. Jair Hahn for the left elbow wound. Ely Love PA-C  Office : [phone]  Cell: [phone]    Subjective:     Resting, complaining of appropriate pain. States that wound VAC \"ripped out\" around 3 am this morning. Eager to be discharged. Objective:   Patient Vitals for the past 12 hrs:   BP Temp Pulse Resp SpO2   11/08/22 0445 (!) 162/91 97.9 °F (36.6 °C) 85 18 95 %   11/08/22 0006 (!) 149/88 98.3 °F (36.8 °C) 65 18 98 %   11/07/22 1937 121/76 98.3 °F (36.8 °C) 70 20 95 %       GENERAL: No acute distress. MUSCULOSKELETAL EXAM  Left upper extremity -   Wound VAC disconnected from the flange, though adhesive and underlying foam dressing remains intact. Small amount of serosanguinous drainage on dressing. Cannister with 250-300 mL serosanguinous fluid. Fingers warm and well perfused. Sensation grossly in tact to light touch over 1st dorsal web space, tips of long and small fingers. +Thumb up, , finger extension, finger spread, OK.     LABS :  Recent Labs     11/07/22  0314   CREA 1.06       No results found for: SDES    Lab Results   Component Value Date/Time    Culture result: (A) 11/04/2022 01:43 PM     HEAVY ANAEROBIC GRAM NEGATIVE RODS BETA LACTAMASE NEGATIVE    Culture result: HEAVY CITROBACTER FREUNDII (A) 11/04/2022 01:43 PM    Culture result: (A) 11/04/2022 01:43 PM     HEAVY HAEMOPHILUS SPECIES RESEMBLING HAEMOPHILUS HAEMOLYTICUS  BETA LACTAMASE NEGATIVE    Culture result: HEAVY DIPHTHEROIDS (2 COLONY TYPES) (A) 11/04/2022 01:43 PM

## 2022-11-08 NOTE — PROGRESS NOTES
Problem: Falls - Risk of  Goal: *Absence of Falls  Description: Document Shade Gansevoort Fall Risk and appropriate interventions in the flowsheet.   Outcome: Progressing Towards Goal  Note: Fall Risk Interventions:  Mobility Interventions: Patient to call before getting OOB         Medication Interventions: Bed/chair exit alarm, Patient to call before getting OOB                   Problem: General Infection Care Plan (Adult and Pediatric)  Goal: Improvement in signs and symptoms of infection  Outcome: Progressing Towards Goal  Goal: *Optimize nutritional status  Outcome: Progressing Towards Goal     Problem: Patient Education: Go to Patient Education Activity  Goal: Patient/Family Education  Outcome: Progressing Towards Goal

## 2022-11-08 NOTE — PROGRESS NOTES
PICC Placement Note    PRE-PROCEDURE VERIFICATION  Correct Procedure: yes  Correct Site:  yes  Temperature: Temp: 97.9 °F (36.6 °C), Temperature Source: Temp Source: Oral  Recent Labs     11/07/22  0314   CREA 1.06     Allergies: Patient has no known allergies. Education materials for PICC Care given: yes. See Patient Education activity for further details. PICC Booklet placed at bedside: yes    Closed Ended PICC Catheters:  Flush Lumens as Follows:  Intermittent Medication:   Flush before and after each medication with 10 ml NS. Unused Ports:  Flush every 8 hours with 10 ml NS.  TPN Ports:  Flush every 24 hours with 20 ml NS prior to hanging new bag. Blood Draws: Stop infusion, draw off and waste 10 ml of blood. Draw sample with 10cc syringe or greater. DO NOT USE VACUTAINER . Transfer with appropriate device to lab  tubes. Flush with 20 ml NS. Dressing Change:  Every 7 days, and PRN using sterile technique if integrity of dressing is compromised. Initial dressing change for central line 24-48 hours post insertion if gauze is used. Apply new dressing per policy. PROCEDURE DETAIL  Consent was obtained and all questions were answered related to risks and benefits. A single lumen PICC line was inserted, as a sterile procedure using ultrasound and modified Seldinger technique for antibiotic therapy. The following documentation is in addition to the PICC properties in the lines/airways flowsheet :  Lot #: [id number]  Lidocaine 1% administered intradermally :yes  Internal Catheter Total Length: 46 (cm) 2 cm out   Vein Selection for PICC:right basilic  Central Line Bundle followed yes  Complication Related to Insertion:no    The placement was verified by EKG, MAX P WAVE @ 46 (cm) with 2 cm out  PER EKG PICC TIP @ C/A junction.          Line is okay to use: yes    Adam Cardoso RN

## 2022-11-08 NOTE — DISCHARGE SUMMARY
Discharge Summary    Patient ID:  Shiloh Gutierrez  [id number]  82 y.o.  [id number]    Admit date: 11/4/2022    Discharge date and time: 11/08/22    Admitting Physician: Chris Holder MD     Admission Diagnoses: Olecranon bursitis of left elbow [M70.22]    Discharge Diagnoses: Active Problems:    Olecranon bursitis of left elbow (11/4/2022)        Surgeon: Sheeba Foster MD    Postoperative transfusions:     None. HOSPITAL COURSE:  The patients history, physical exams, and labs are well document in the chart. On the date of admission, the patient successfully underwent surgery and suffered no complications intraoperatively. Please refer to the admission history and physical as well as the operative note for details of the injury as well as the discussion of risks, benefits, and alternatives to the above procedure. From PACU, the patient was transferred to the mai in stable condition. Postoperatively, pain was initially controlled with a combination of IV and oral narcotics initially and at the time of discharge was controlled with solely oral medications. The patient received mechanical SCDs and  BID for DVT prophylaxis postoperatively until discharge. The patient was seen and evaluated by Infectious Disease and deemed appropriate for discharge. The remainder of the hospital course was unremarkable, and the patient was/will be discharged on the above date. RECENT LABS: No results for input(s): WBC, HGB, HCT, PLT, HGBEXT, HCTEXT, PLTEXT in the last 72 hours. COMPLICATIONS: None identified. IMPORTANT HOSPITAL EVENTS :   Surgery: Left elbow olecranon bursectomy and wound VAC application with Dr. Alvaro Tuttle on 11/04/22. PICC line placement 11/08/22. CONSULTS: Infectious Disease for septic olecranon bursitis. DISCHARGE TO:     Home with Home Health for PICC line care and IV antibiotics. FOLLOWUP: 1 week.     DISCHARGE DIET: Resume preadmission diet    DISCHARGE MEDS:   Please refer to discharge medication reconciliation. DISCHARGE INSTRUCTIONS:  Please refer to attached discharge instructions.     Ely Love PA-C 11/8/2022 9:06 AM

## 2022-11-08 NOTE — PROGRESS NOTES
Spiritual Care Partner Volunteer visited patient at [address] in [facility] 4M POST SURG ORT 2 on 11/07/2022.      Documented by:  Shama Riveor ([id number])

## 2022-11-08 NOTE — PROGRESS NOTES
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 Status post I&D and olecranon bursectomy 2022. Per operative note the infected fluid did not invade the bone or underlying fascia. Cultures reveal a polymicrobial infection including Citrobacter, haemophilus, diphtheroids, and anaerobes. Discussed with orthopedic surgery. Anticipate discharge on ertapenem x2 weeks. PICC line ordered. IV antibiotic orders are in the chart. Will order first dose of ertapenem for today  Obesity. BMI is 37           Subjective:     No complaints. Anxious for discharge    Objective:     Vitals: Visit Vitals  BP (!) 162/91 (BP 1 Location: Right upper arm, BP Patient Position: Lying)   Pulse 85   Temp 97.9 °F (36.6 °C)   Resp 18   Ht 6' (1.829 m)   Wt 274 lb 4 oz (124.4 kg)   SpO2 95%   BMI 37.20 kg/m²        Tmax:  Temp (24hrs), Av.3 °F (36.8 °C), Min:97.9 °F (36.6 °C), Max:98.8 °F (37.1 °C)      Exam:   Patient is intubated:  no    Physical Examination:   General:  Alert, cooperative, no distress   Head:  Normocephalic, atraumatic. Eyes:  Conjunctivae clear   Neck: Supple       Lungs:   No distress. Chest wall:     Heart:     Abdomen:   non-distended   Extremities: Moves all. Wound VAC removed. Wound remains dressed   Skin: No acute rash on exposed skin   Neurologic: CNII-XII intact. Normal strength     Labs:        No lab exists for component: ITNL   No results for input(s): CPK, CKMB, TROIQ in the last 72 hours. Recent Labs     22  0314 22  0234   CREA 1.06 1.13     No results for input(s): INR, PTP, APTT, INREXT in the last 72 hours.   Needs: urine analysis, urine sodium, protein and creatinine  No results found for: ARY, CREAU      Cultures:     No results found for: SARIAH  Lab Results   Component Value Date/Time    Culture result: (A) 11/04/2022 01:43 PM     HEAVY ANAEROBIC GRAM NEGATIVE RODS BETA LACTAMASE NEGATIVE    Culture result: NO FUNGUS ISOLATED 4 DAYS 11/04/2022 01:43 PM    Culture result: HEAVY CITROBACTER FREUNDII (A) 11/04/2022 01:43 PM    Culture result: (A) 11/04/2022 01:43 PM     HEAVY HAEMOPHILUS SPECIES RESEMBLING HAEMOPHILUS HAEMOLYTICUS  BETA LACTAMASE NEGATIVE    Culture result: HEAVY DIPHTHEROIDS (2 COLONY TYPES) (A) 11/04/2022 01:43 PM       Radiology:     Medications       Current Facility-Administered Medications   Medication Dose Route Frequency Last Admin    alteplase (CATHFLO) 1 mg in sterile water (preservative free) 1 mL injection  1 mg InterCATHeter PRN      metroNIDAZOLE (FLAGYL) tablet 500 mg  500 mg Oral Q12H 500 mg at 11/08/22 0818    HYDROmorphone (DILAUDID) injection 1 mg  1 mg IntraVENous Q4H PRN 1 mg at 11/08/22 0814    oxyCODONE IR (ROXICODONE) tablet 5 mg  5 mg Oral Q4H PRN 5 mg at 11/08/22 0538    oxyCODONE IR (ROXICODONE) tablet 10 mg  10 mg Oral Q4H PRN 10 mg at 11/08/22 0859    cefTRIAXone (ROCEPHIN) 2 g in 0.9% sodium chloride 20 mL IV syringe  2 g IntraVENous Q24H 2 g at 11/07/22 1627    ipratropium (ATROVENT) 0.02 % nebulizer solution 0.5 mg  0.5 mg Nebulization Q6H PRN      gabapentin (NEURONTIN) capsule 800 mg  800 mg Oral  mg at 11/08/22 0817    arformoterol 15 mcg/budesonide 0.5 mg neb solution   Nebulization BID RT Given at 11/07/22 2156    sodium chloride (NS) flush 5-40 mL  5-40 mL IntraVENous Q8H 10 mL at 11/08/22 0538    sodium chloride (NS) flush 5-40 mL  5-40 mL IntraVENous PRN      acetaminophen (TYLENOL) tablet 1,000 mg  1,000 mg Oral Q6H 1,000 mg at 11/08/22 0042    naloxone (NARCAN) injection 0.4 mg  0.4 mg IntraVENous PRN      hydrOXYzine HCL (ATARAX) tablet 10 mg  10 mg Oral Q8H PRN      famotidine (PEPCID) tablet 20 mg  20 mg Oral BID 20 mg at 11/08/22 0859    senna-docusate (PERICOLACE) 8.6-50 mg per tablet 1 Tablet  1 Tablet Oral BID 1 Tablet at 11/07/22 1838    polyethylene glycol (MIRALAX) packet 17 g  17 g Oral DAILY 17 g at 11/08/22 0814    bisacodyL (DULCOLAX) suppository 10 mg  10 mg Rectal DAILY PRN      aspirin delayed-release tablet 325 mg  325 mg Oral Q12H 325 mg at 11/08/22 0818    amLODIPine (NORVASC) tablet 10 mg  10 mg Oral DAILY 10 mg at 11/08/22 0818    losartan (COZAAR) tablet 100 mg  100 mg Oral DAILY 100 mg at 11/08/22 1934           Case discussed with:      Dmitry Hicks DO

## 2022-11-10 NOTE — PROGRESS NOTES
Physician Progress Note      PATIENT:               Cristofer Figueroa  CSN #:                  [id number]  :                       1955  ADMIT DATE:       2022 11:01 AM  DISCH DATE:        2022 4:00 PM  RESPONDING  PROVIDER #:        Adán Laughlin PA-C          QUERY TEXT:    Pt admitted with bursitis. Culture and sensitivity was performed on  and results indicated resistance to cefazolin and cefoxitin. Pt to be discharged on Ertepenem x 2 weeks. After further review, can this patient be considered to have: The medical record reflects the following:  Risk Factors: 79 y.o. male with a past medical history significant for hypertension, obstructive sleep apnea, and obesity who is being seen for left elbow infection; s/p Excisional debridement/bursectomy, left elbow  Clinical Indicators: Culture and sensitivity was performed on  and results indicated resistance to cefazolin and cefoxitin. Treatment: Patient is currently on vancomycin and ceftriaxone. Anticipate discharge on ertapenem x2 weeks. PICC line ordered. Thank you,  Skyla Mistry RN, [email or web address]  .  Options provided:  -- Cephalosporin Resistance  -- No antibiotic resistance  -- Other - I will add my own diagnosis  -- Disagree - Not applicable / Not valid  -- Disagree - Clinically unable to determine / Unknown  -- Refer to Clinical Documentation Reviewer    PROVIDER RESPONSE TEXT:    Provider is clinically unable to determine a response to this query.   Specific abx resistance to cefazolin and cefoxitin    Query created by: Malcom Dawson on 2022 12:37 PM      Electronically signed by:  Adán Laughlin PA-C 11/10/2022 11:50 AM

## 2022-11-11 LAB — CREATININE, EXTERNAL: 1.1

## 2022-11-15 ENCOUNTER — TELEPHONE (OUTPATIENT)
Dept: ONCOLOGY | Age: 67
End: 2022-11-15

## 2022-11-15 ENCOUNTER — OFFICE VISIT (OUTPATIENT)
Dept: FAMILY MEDICINE CLINIC | Age: 67
End: 2022-11-15
Payer: MEDICARE

## 2022-11-15 VITALS
SYSTOLIC BLOOD PRESSURE: 159 MMHG | HEIGHT: 72 IN | DIASTOLIC BLOOD PRESSURE: 85 MMHG | TEMPERATURE: 98.5 F | WEIGHT: 274 LBS | OXYGEN SATURATION: 96 % | HEART RATE: 78 BPM | BODY MASS INDEX: 37.11 KG/M2 | RESPIRATION RATE: 20 BRPM

## 2022-11-15 DIAGNOSIS — D04.62 SQUAMOUS CELL CARCINOMA IN SITU (SCCIS) OF SKIN OF LEFT ELBOW: Primary | ICD-10-CM

## 2022-11-15 DIAGNOSIS — J43.9 PULMONARY EMPHYSEMA, UNSPECIFIED EMPHYSEMA TYPE (HCC): ICD-10-CM

## 2022-11-15 DIAGNOSIS — M50.30 DDD (DEGENERATIVE DISC DISEASE), CERVICAL: ICD-10-CM

## 2022-11-15 DIAGNOSIS — A49.8 INFECTION DUE TO ANAEROBES: ICD-10-CM

## 2022-11-15 DIAGNOSIS — F11.99 OPIOID USE, UNSPECIFIED WITH UNSPECIFIED OPIOID-INDUCED DISORDER (HCC): ICD-10-CM

## 2022-11-15 DIAGNOSIS — I10 ESSENTIAL HYPERTENSION: ICD-10-CM

## 2022-11-15 DIAGNOSIS — L72.0 EPIDERMAL INCLUSION CYST: ICD-10-CM

## 2022-11-15 DIAGNOSIS — M70.22 OLECRANON BURSITIS OF LEFT ELBOW: Primary | ICD-10-CM

## 2022-11-15 PROCEDURE — G8754 DIAS BP LESS 90: HCPCS | Performed by: FAMILY MEDICINE

## 2022-11-15 PROCEDURE — G8753 SYS BP > OR = 140: HCPCS | Performed by: FAMILY MEDICINE

## 2022-11-15 PROCEDURE — G8536 NO DOC ELDER MAL SCRN: HCPCS | Performed by: FAMILY MEDICINE

## 2022-11-15 PROCEDURE — 3017F COLORECTAL CA SCREEN DOC REV: CPT | Performed by: FAMILY MEDICINE

## 2022-11-15 PROCEDURE — G8510 SCR DEP NEG, NO PLAN REQD: HCPCS | Performed by: FAMILY MEDICINE

## 2022-11-15 PROCEDURE — 99214 OFFICE O/P EST MOD 30 MIN: CPT | Performed by: FAMILY MEDICINE

## 2022-11-15 PROCEDURE — 1123F ACP DISCUSS/DSCN MKR DOCD: CPT | Performed by: FAMILY MEDICINE

## 2022-11-15 PROCEDURE — 1101F PT FALLS ASSESS-DOCD LE1/YR: CPT | Performed by: FAMILY MEDICINE

## 2022-11-15 PROCEDURE — 1111F DSCHRG MED/CURRENT MED MERGE: CPT | Performed by: FAMILY MEDICINE

## 2022-11-15 PROCEDURE — G8417 CALC BMI ABV UP PARAM F/U: HCPCS | Performed by: FAMILY MEDICINE

## 2022-11-15 PROCEDURE — G8427 DOCREV CUR MEDS BY ELIG CLIN: HCPCS | Performed by: FAMILY MEDICINE

## 2022-11-15 PROCEDURE — 3074F SYST BP LT 130 MM HG: CPT | Performed by: FAMILY MEDICINE

## 2022-11-15 PROCEDURE — 3078F DIAST BP <80 MM HG: CPT | Performed by: FAMILY MEDICINE

## 2022-11-15 RX ORDER — GABAPENTIN 800 MG/1
800 TABLET ORAL 3 TIMES DAILY
Qty: 270 TABLET | Refills: 1 | Status: SHIPPED | OUTPATIENT
Start: 2022-11-15

## 2022-11-15 RX ORDER — FLUTICASONE FUROATE AND VILANTEROL TRIFENATATE 100; 25 UG/1; UG/1
1 POWDER RESPIRATORY (INHALATION) DAILY
Qty: 3 EACH | Refills: 3 | Status: SHIPPED | OUTPATIENT
Start: 2022-11-15

## 2022-11-15 RX ORDER — OXYCODONE AND ACETAMINOPHEN 5; 325 MG/1; MG/1
1 TABLET ORAL
Qty: 45 TABLET | Refills: 0 | Status: SHIPPED | OUTPATIENT
Start: 2022-11-15 | End: 2022-11-25

## 2022-11-15 RX ORDER — LOSARTAN POTASSIUM 100 MG/1
100 TABLET ORAL DAILY
Qty: 90 TABLET | Refills: 1 | Status: SHIPPED | OUTPATIENT
Start: 2022-11-15

## 2022-11-15 NOTE — PROGRESS NOTES
Discussed patient case with Thoracic Tumor Board on 11/15/22:    Mr. Ann Wang had an initial left elbow surgery for left olecranon bursitis with soft tissue mass excision on 6/9/22 with finding of squamous cell carcinoma arising from epidermal inclusion cyst. No adjuvant therapy offered at that time given the entire cyst was removed. Pt had recurrence of left elbow swelling and infection. Repeat surgery on 11/4/22 revealed squamous cell carcinoma again. However, per Orthopedic Surgeon, the tissue was quite disorganized and was only able to be removed in pieces. Pt also has a wound overlying this region which is healing slowly. Pathology findings listed below. Consensus of tumor board was that although there is no specific data or guideline recommendation on management of cancer arising in epidermal inclusion cyst or within elbow cavity, the current finding of cancer recurrence and the inability to definitively say all of the tumor was removed is suspicious for residual disease or positive margins. Therefore, extrapolating from other primary sites of squamous cell carcinoma, it is reasonable to offer additional therapy in the adjuvant setting. Radiation therapy is an option. Systemic therapy such as immunotherapy would be reserved for more advanced (LN involvement) or metastatic disease. Will refer patient to [facility] for evaluation. However, any adjuvant radiation therapy must wait until patient's current elbow wound is fully healed. Dr. Alvaro Tuttle in Orthopedics to notify me or [facility] once pt has healed well. 6/9/22 soft tissue mass excision, left elbow:  Very well differentiated  squamous cell carcinoma with microinvasion arising in an epidermal inclusion cyst wall. Area of carcinoma about 1.5cm in the sections. Cannot assess margins. 11/4/22 left elbow bursitis; excision:  Invasive squamous cell carcinoma, well-differentiated, keratinizing type. Chronic bursitis.

## 2022-11-15 NOTE — TELEPHONE ENCOUNTER
[address] [place]  [phone]      11/15/22 2:51 PM Called patient and verified patient ID x 2. Updated patient that Dr. Renata Dowling discussed his case with Dr. Lina Booth as well as a tumor board conference. Dr. Renata Dowling recommends that patient meet with Radiation Oncology to discuss possible radiation treatment to the left elbow in the future once his left elbow wound has healed. Advised that Dr. Renata Dowling has placed a referral for him to see [facility] in the next 4-6 weeks. Patient voiced understanding and agreeable to plan. No further questions or concerns at this time.

## 2022-11-15 NOTE — PROGRESS NOTES
Patient here for hosp f/up for left elbow surgery at [facility] 11/4/2022. Soft cast to left arm intact. Drain intact. PICC line in right upper arm. Wife is doing home antibiotics. Chief Complaint   Patient presents with    Hospital Follow Up     He is a 79 y.o. male who presents for evalution. Reviewed PmHx, RxHx, FmHx, SocHx, AllgHx and updated and dated in the chart. Patient Active Problem List    Diagnosis    Olecranon bursitis of left elbow    Abnormal liver enzymes    Severe obesity (HCC)    Peripheral vascular disease (HCC)    Pulmonary emphysema (HCC)    DDD (degenerative disc disease), cervical    Essential hypertension    HELGA (obstructive sleep apnea)       Review of Systems - negative except as listed above in the HPI    Objective:     Vitals:    11/15/22 1032   BP: (!) 159/85   Pulse: 78   Resp: 20   Temp: 98.5 °F (36.9 °C)   SpO2: 96%   Weight: 274 lb (124.3 kg)   Height: 6' (1.829 m)         Assessment/ Plan:   Diagnoses and all orders for this visit:    1. Olecranon bursitis of left elbow  -see dc note  -cont plan  -has [facility]    2. Pulmonary emphysema, unspecified emphysema type (HCC)  -     Breo Ellipta 100-25 mcg/dose inhaler; Take 1 Puff by inhalation daily. 3. DDD (degenerative disc disease), cervical  -     gabapentin (NEURONTIN) 800 mg tablet; Take 1 Tablet by mouth three (3) times daily. 4. Essential hypertension  -elevated but in pain and pic line etc... Other orders  -     tiotropium (Spiriva with HandiHaler) 18 mcg inhalation capsule; INHALE THE CONTENTS OF 1 CAPSULE VIA INHALATION DEVICE EVERY DAY  -     losartan (COZAAR) 100 mg tablet; Take 1 Tablet by mouth daily. I have discussed the diagnosis with the patient and the intended plan as seen in the above orders. The patient understands and agrees with the plan. The patient has received an after-visit summary and questions were answered concerning future plans.      Medication Side Effects and Warnings were discussed with patient  Patient Labs were reviewed and or requested:  Patient Past Records were reviewed and or requested    Sapna Diaz M.D. There are no Patient Instructions on file for this visit.

## 2022-11-15 NOTE — PROGRESS NOTES
Patient here for hosp f/up for left elbow surgery at [facility] 11/4/2022. Soft cast to left arm intact. Drain intact. PICC line in right upper arm. Wife is doing home antibiotics.

## 2022-12-22 ENCOUNTER — HOSPITAL ENCOUNTER (OUTPATIENT)
Dept: RADIATION THERAPY | Age: 67
Discharge: HOME | End: 2022-12-22

## 2023-03-16 ENCOUNTER — TELEPHONE (OUTPATIENT)
Dept: FAMILY MEDICINE CLINIC | Age: 68
End: 2023-03-16

## 2023-03-17 ENCOUNTER — HOSPITAL ENCOUNTER (EMERGENCY)
Age: 68
Discharge: HOME | End: 2023-03-17
Attending: STUDENT IN AN ORGANIZED HEALTH CARE EDUCATION/TRAINING PROGRAM
Payer: MEDICARE

## 2023-03-17 VITALS
DIASTOLIC BLOOD PRESSURE: 99 MMHG | BODY MASS INDEX: 38.87 KG/M2 | TEMPERATURE: 98.8 F | SYSTOLIC BLOOD PRESSURE: 161 MMHG | OXYGEN SATURATION: 98 % | RESPIRATION RATE: 18 BRPM | WEIGHT: 287 LBS | HEIGHT: 72 IN | HEART RATE: 77 BPM

## 2023-03-17 DIAGNOSIS — Z48.89 ENCOUNTER FOR POST SURGICAL WOUND CHECK: Primary | ICD-10-CM

## 2023-03-17 PROCEDURE — 99282 EMERGENCY DEPT VISIT SF MDM: CPT

## 2023-03-17 RX ORDER — ACETAMINOPHEN 325 MG/1
650 TABLET ORAL ONCE
Status: DISCONTINUED | OUTPATIENT
Start: 2023-03-17 | End: 2023-03-18 | Stop reason: HOSPADM

## 2023-03-17 NOTE — ED TRIAGE NOTES
Pt had surgery yesterday, states he had CA removed from his arm. Had a nurse come to his house today to apply a wound vac but they did not have all the pieces and can't return until Monday.  Pt presents with left arm in sling, gauze wrap with serosanguinous drainage saturating dressing

## 2023-03-17 NOTE — ED PROVIDER NOTES
[facility] EMERGENCY DEPT  EMERGENCY DEPARTMENT HISTORY AND PHYSICAL EXAM      Date: 3/17/2023  Patient Name: Robert Granado  MRN: [id number]  [place]: 1955  Date of evaluation: 3/17/2023  Provider: Lucy Crystal MD   Note Started: 7:53 PM 3/17/23    HISTORY OF PRESENT ILLNESS     Chief Complaint   Patient presents with    Wound Check       History Provided By: Patient    HPI: Robert Granado, 79 y.o. male presents emergency department for left arm wound VAC. Patient had a cancerous growth removed 2 days ago from left arm at [facility]. Had wound care visiting at home, was post to have a wound VAC placed today however wound nurse came to patient's house, did not have appropriate equipment, could not start wound VAC. Patient came to emergency department due to open wound, concern for bleeding from wound. PAST MEDICAL HISTORY   Past Medical History:  Past Medical History:   Diagnosis Date    Chronic obstructive pulmonary disease (HCC)     GERD (gastroesophageal reflux disease)     History of vascular access device 11/08/2022    [facility] VAT placed 4 fr single PICC, rt basilic 46 cm 2 cm out, arm cir 41 cm    Hypertension     HELGA (obstructive sleep apnea) 12/16/2019       Past Surgical History:  Past Surgical History:   Procedure Laterality Date    HX UROLOGICAL         Family History:  Family History   Problem Relation Age of Onset    Hypertension Mother        Social History:  Social History     Tobacco Use    Smoking status: Former     Packs/day: 0.25     Types: Cigarettes    Smokeless tobacco: Never    Tobacco comments:     3 cigarettes daily   Vaping Use    Vaping Use: Never used   Substance Use Topics    Alcohol use: Not Currently     Alcohol/week: 6.0 standard drinks     Types: 6 Cans of beer per week    Drug use: Not Currently     Types: Cocaine       Allergies:   Allergies   Allergen Reactions    Apple Hives    Pear Hives       PCP: Steven Gooden MD    Current Meds:   Discharge Medication List as of LOV: 2/6/20. Medication refilled.    3/17/2023  9:39 PM        CONTINUE these medications which have NOT CHANGED    Details   Breo Ellipta 100-25 mcg/dose inhaler Take 1 Puff by inhalation daily. , Normal, Disp-3 Each, R-3, STEPHIE      tiotropium (Spiriva with HandiHaler) 18 mcg inhalation capsule INHALE THE CONTENTS OF 1 CAPSULE VIA INHALATION DEVICE EVERY DAY, Normal, Disp-90 Capsule, R-1      losartan (COZAAR) 100 mg tablet Take 1 Tablet by mouth daily. , Normal, Disp-90 Tablet, R-1      gabapentin (NEURONTIN) 800 mg tablet Take 1 Tablet by mouth three (3) times daily. , Normal, Disp-270 Tablet, R-1      ondansetron (ZOFRAN ODT) 8 mg disintegrating tablet Take 1 Tablet by mouth every eight (8) hours as needed for Nausea or Vomiting., Normal, Disp-20 Tablet, R-0      amLODIPine (NORVASC) 10 mg tablet TAKE 1 TABLET BY MOUTH EVERY DAY, Normal, Disp-90 Tablet, R-1      diclofenac EC (VOLTAREN) 75 mg EC tablet TAKE 1 TABLET BY MOUTH TWICE DAILY FOR 14 DAYS, Normal, Disp-60 Tablet, R-2      cyclobenzaprine (FLEXERIL) 10 mg tablet Take 10 mg by mouth three (3) times daily as needed., Historical Med      aspirin delayed-release 81 mg tablet TAKE 1 TABLET BY MOUTH EVERY DAY, Normal, Disp-90 Tab,R-5             REVIEW OF SYSTEMS   Review of Systems   Constitutional:  Negative for activity change, appetite change, chills and fever. HENT:  Negative for congestion, sore throat and trouble swallowing. Eyes:  Negative for photophobia and visual disturbance. Respiratory:  Negative for cough, chest tightness and shortness of breath. Cardiovascular:  Negative for chest pain and palpitations. Gastrointestinal:  Negative for abdominal pain and nausea. Genitourinary:  Negative for dysuria and flank pain. Musculoskeletal:  Negative for arthralgias and neck pain. Skin:  Positive for wound. Negative for color change and pallor. Neurological:  Negative for dizziness, weakness, numbness and headaches. Positives and Pertinent negatives as per HPI.     PHYSICAL EXAM     ED Triage Vitals [03/17/23 1823]   ED Encounter Vitals Group      BP (!) 161/99      Pulse (Heart Rate) 77      Resp Rate 18      Temp 98.8 °F (37.1 °C)      Temp src       O2 Sat (%) 98 %      Weight 287 lb      Height 6'      Physical Exam  Vitals and nursing note reviewed. Constitutional:       Appearance: Normal appearance. HENT:      Head: Normocephalic. Nose: Nose normal.   Cardiovascular:      Rate and Rhythm: Normal rate. Pulses: Normal pulses. Heart sounds: Normal heart sounds. Pulmonary:      Effort: Pulmonary effort is normal.      Breath sounds: Normal breath sounds. Abdominal:      General: Abdomen is flat. Musculoskeletal:         General: No swelling or tenderness. Normal range of motion. Cervical back: Normal range of motion. Skin:     General: Skin is warm. Neurological:      General: No focal deficit present. Mental Status: He is alert. Psychiatric:         Mood and Affect: Mood normal.       SCREENINGS               No data recorded        LAB, EKG AND DIAGNOSTIC RESULTS   Labs:  No results found for this or any previous visit (from the past 12 hour(s)). Radiologic Studies:  Non-plain film images such as CT, Ultrasound and MRI are read by the radiologist. Plain radiographic images are visualized and preliminarily interpreted by the ED Provider with the below findings:    *    Interpretation per the Radiologist below, if available at the time of this note:  No results found. EKG: interpreted by myself    PROCEDURES   Unless otherwise noted below, none.   Performed by: Mohit Abbott MD   Procedures      CRITICAL CARE TIME       ED COURSE and DIFFERENTIAL DIAGNOSIS/MDM   Vitals:    Vitals:    03/17/23 1823   BP: (!) 161/99   Pulse: 77   Resp: 18   Temp: 98.8 °F (37.1 °C)   SpO2: 98%   Weight: 130.2 kg (287 lb)   Height: 6' (1.829 m)        Patient was given the following medications:  Medications - No data to display          Records Reviewed (source and summary of external notes): None    CC/HPI Summary, DDx, ED Course, and Reassessment: 66-year-old male, history of cancerous mass removed from left arm 2 days ago at [facility]. Patient had wound nurse come to house today, was supposed to have wound VAC set up however wound nurse did not have appropriate equipment, is to come back in 2 days. Patient was concerned that wound was left open and is oozing blood. Physical shows excised surgical lesion on left arm, no pulsatile bleeding small oozing around skin edges, no obvious signs of infection. Plan on repacking wound wet-to-dry, instructed patient to follow-up with wound nurse when she comes back to him on Monday. ED Course as of 03/19/23 0246   Fri Mar 17, 2023   2131 I took down patient's postop dressing, no active bleeding small amount of oozing around incision edges, I repacked wound wet-to-dry, instructed patient to redress if any more bleeding or soaking through. Wound nurse to visit patient on Monday. [PZ]      ED Course User Index  [PZ] Manny Montoya MD       Disposition Considerations (Tests not done, Shared Decision Making, Pt Expectation of Test or Treatment.):      FINAL IMPRESSION     1. Encounter for post surgical wound check          DISPOSITION/PLAN   Discharged         PATIENT REFERRED TO:  Follow-up Information       Follow up With Specialties Details Why Contact Info    [facility] Department Of Orthopedic Surgery   If symptoms worsen Carrie  [phone]              DISCHARGE MEDICATIONS:  Discharge Medication List as of 3/17/2023  9:39 PM            DISCONTINUED MEDICATIONS:  Discharge Medication List as of 3/17/2023  9:39 PM          I am the Primary Clinician of Record: Julian Anderson MD (electronically signed)    (Please note that parts of this dictation were completed with voice recognition software.  Quite often unanticipated grammatical, syntax, homophones, and other interpretive errors are inadvertently transcribed by the computer software. Please disregards these errors.  Please excuse any errors that have escaped final proofreading.)

## 2023-03-20 RX ORDER — LOSARTAN POTASSIUM 100 MG/1
TABLET ORAL
Qty: 90 TABLET | Refills: 1 | Status: SHIPPED | OUTPATIENT
Start: 2023-03-20

## 2023-03-26 ENCOUNTER — HOSPITAL ENCOUNTER (INPATIENT)
Age: 68
LOS: 1 days | Discharge: HOME HEALTH | DRG: 909 | End: 2023-03-26
Attending: STUDENT IN AN ORGANIZED HEALTH CARE EDUCATION/TRAINING PROGRAM | Admitting: INTERNAL MEDICINE
Payer: MEDICARE

## 2023-03-26 VITALS
SYSTOLIC BLOOD PRESSURE: 135 MMHG | TEMPERATURE: 98.3 F | WEIGHT: 287 LBS | DIASTOLIC BLOOD PRESSURE: 80 MMHG | OXYGEN SATURATION: 100 % | BODY MASS INDEX: 38.87 KG/M2 | HEART RATE: 73 BPM | HEIGHT: 72 IN | RESPIRATION RATE: 18 BRPM

## 2023-03-26 DIAGNOSIS — R58 BLEEDING: Primary | ICD-10-CM

## 2023-03-26 PROBLEM — T81.30XA WOUND DEHISCENCE: Status: ACTIVE | Noted: 2023-03-26

## 2023-03-26 LAB
ABO + RH BLD: NORMAL
ALBUMIN SERPL-MCNC: 3.2 G/DL (ref 3.5–5)
ALBUMIN/GLOB SERPL: 0.7 (ref 1.1–2.2)
ALP SERPL-CCNC: 68 U/L (ref 45–117)
ALT SERPL-CCNC: 19 U/L (ref 12–78)
ANION GAP SERPL CALC-SCNC: 5 MMOL/L (ref 5–15)
APTT PPP: 33.7 SEC (ref 21.2–34.1)
AST SERPL W P-5'-P-CCNC: 21 U/L (ref 15–37)
BASOPHILS # BLD: 0.1 K/UL (ref 0–0.1)
BASOPHILS NFR BLD: 1 % (ref 0–1)
BILIRUB SERPL-MCNC: 0.3 MG/DL (ref 0.2–1)
BLOOD GROUP ANTIBODIES SERPL: NEGATIVE
BUN SERPL-MCNC: 16 MG/DL (ref 6–20)
BUN/CREAT SERPL: 13 (ref 12–20)
CA-I BLD-MCNC: 8.5 MG/DL (ref 8.5–10.1)
CHLORIDE SERPL-SCNC: 109 MMOL/L (ref 97–108)
CO2 SERPL-SCNC: 22 MMOL/L (ref 21–32)
CREAT SERPL-MCNC: 1.25 MG/DL (ref 0.7–1.3)
DIFFERENTIAL METHOD BLD: ABNORMAL
EOSINOPHIL # BLD: 0.3 K/UL (ref 0–0.4)
EOSINOPHIL NFR BLD: 3 % (ref 0–7)
ERYTHROCYTE [DISTWIDTH] IN BLOOD BY AUTOMATED COUNT: 16.3 % (ref 11.5–14.5)
GLOBULIN SER CALC-MCNC: 4.5 G/DL (ref 2–4)
GLUCOSE SERPL-MCNC: 126 MG/DL (ref 65–100)
HCT VFR BLD AUTO: 36.4 % (ref 36.6–50.3)
HCT VFR BLD AUTO: 40.7 % (ref 36.6–50.3)
HGB BLD-MCNC: 11.7 G/DL (ref 12.1–17)
HGB BLD-MCNC: 13 G/DL (ref 12.1–17)
IMM GRANULOCYTES # BLD AUTO: 0 K/UL (ref 0–0.04)
IMM GRANULOCYTES NFR BLD AUTO: 0 % (ref 0–0.5)
INR PPP: 1.1 (ref 0.9–1.1)
LYMPHOCYTES # BLD: 2.8 K/UL (ref 0.8–3.5)
LYMPHOCYTES NFR BLD: 39 % (ref 12–49)
MCH RBC QN AUTO: 28.4 PG (ref 26–34)
MCHC RBC AUTO-ENTMCNC: 31.9 G/DL (ref 30–36.5)
MCV RBC AUTO: 89.1 FL (ref 80–99)
MONOCYTES # BLD: 0.9 K/UL (ref 0–1)
MONOCYTES NFR BLD: 12 % (ref 5–13)
NEUTS SEG # BLD: 3.3 K/UL (ref 1.8–8)
NEUTS SEG NFR BLD: 45 % (ref 32–75)
NRBC # BLD: 0 K/UL (ref 0–0.01)
NRBC BLD-RTO: 0 PER 100 WBC
PLATELET # BLD AUTO: 304 K/UL (ref 150–400)
PMV BLD AUTO: 9 FL (ref 8.9–12.9)
POTASSIUM SERPL-SCNC: 4 MMOL/L (ref 3.5–5.1)
PROT SERPL-MCNC: 7.7 G/DL (ref 6.4–8.2)
PROTHROMBIN TIME: 14.5 SEC (ref 11.9–14.6)
RBC # BLD AUTO: 4.57 M/UL (ref 4.1–5.7)
SODIUM SERPL-SCNC: 136 MMOL/L (ref 136–145)
SPECIMEN EXP DATE BLD: NORMAL
THERAPEUTIC RANGE,PTTT: NORMAL SEC (ref 82–109)
WBC # BLD AUTO: 7.3 K/UL (ref 4.1–11.1)

## 2023-03-26 PROCEDURE — 94640 AIRWAY INHALATION TREATMENT: CPT

## 2023-03-26 PROCEDURE — G0378 HOSPITAL OBSERVATION PER HR: HCPCS

## 2023-03-26 PROCEDURE — 74011250637 HC RX REV CODE- 250/637: Performed by: INTERNAL MEDICINE

## 2023-03-26 PROCEDURE — 80053 COMPREHEN METABOLIC PANEL: CPT

## 2023-03-26 PROCEDURE — 85025 COMPLETE CBC W/AUTO DIFF WBC: CPT

## 2023-03-26 PROCEDURE — 99221 1ST HOSP IP/OBS SF/LOW 40: CPT | Performed by: SURGERY

## 2023-03-26 PROCEDURE — 85730 THROMBOPLASTIN TIME PARTIAL: CPT

## 2023-03-26 PROCEDURE — 0X3F0ZZ CONTROL BLEEDING IN LEFT LOWER ARM, OPEN APPROACH: ICD-10-PCS | Performed by: STUDENT IN AN ORGANIZED HEALTH CARE EDUCATION/TRAINING PROGRAM

## 2023-03-26 PROCEDURE — 96375 TX/PRO/DX INJ NEW DRUG ADDON: CPT

## 2023-03-26 PROCEDURE — 96374 THER/PROPH/DIAG INJ IV PUSH: CPT

## 2023-03-26 PROCEDURE — 94761 N-INVAS EAR/PLS OXIMETRY MLT: CPT

## 2023-03-26 PROCEDURE — 86900 BLOOD TYPING SEROLOGIC ABO: CPT

## 2023-03-26 PROCEDURE — 85610 PROTHROMBIN TIME: CPT

## 2023-03-26 PROCEDURE — 74011000250 HC RX REV CODE- 250: Performed by: INTERNAL MEDICINE

## 2023-03-26 PROCEDURE — 65270000032 HC RM SEMIPRIVATE

## 2023-03-26 PROCEDURE — 74011250636 HC RX REV CODE- 250/636: Performed by: STUDENT IN AN ORGANIZED HEALTH CARE EDUCATION/TRAINING PROGRAM

## 2023-03-26 PROCEDURE — 74011250636 HC RX REV CODE- 250/636: Performed by: INTERNAL MEDICINE

## 2023-03-26 PROCEDURE — 99285 EMERGENCY DEPT VISIT HI MDM: CPT

## 2023-03-26 PROCEDURE — 85018 HEMOGLOBIN: CPT

## 2023-03-26 RX ORDER — ACETAMINOPHEN 325 MG/1
650 TABLET ORAL
Qty: 180 TABLET | Refills: 0 | Status: SHIPPED
Start: 2023-03-26 | End: 2023-04-25

## 2023-03-26 RX ORDER — ONDANSETRON 2 MG/ML
4 INJECTION INTRAMUSCULAR; INTRAVENOUS
Status: DISCONTINUED | OUTPATIENT
Start: 2023-03-26 | End: 2023-03-26 | Stop reason: HOSPADM

## 2023-03-26 RX ORDER — ACETAMINOPHEN 650 MG/1
650 SUPPOSITORY RECTAL
Status: DISCONTINUED | OUTPATIENT
Start: 2023-03-26 | End: 2023-03-26 | Stop reason: HOSPADM

## 2023-03-26 RX ORDER — SODIUM CHLORIDE 0.9 % (FLUSH) 0.9 %
5-40 SYRINGE (ML) INJECTION EVERY 8 HOURS
Status: DISCONTINUED | OUTPATIENT
Start: 2023-03-26 | End: 2023-03-26 | Stop reason: HOSPADM

## 2023-03-26 RX ORDER — BUDESONIDE AND FORMOTEROL FUMARATE DIHYDRATE 80; 4.5 UG/1; UG/1
2 AEROSOL RESPIRATORY (INHALATION)
Status: DISCONTINUED | OUTPATIENT
Start: 2023-03-26 | End: 2023-03-26 | Stop reason: HOSPADM

## 2023-03-26 RX ORDER — ACETAMINOPHEN 325 MG/1
650 TABLET ORAL
Status: DISCONTINUED | OUTPATIENT
Start: 2023-03-26 | End: 2023-03-26 | Stop reason: HOSPADM

## 2023-03-26 RX ORDER — HYDROMORPHONE HYDROCHLORIDE 1 MG/ML
1 INJECTION, SOLUTION INTRAMUSCULAR; INTRAVENOUS; SUBCUTANEOUS
Status: DISCONTINUED | OUTPATIENT
Start: 2023-03-26 | End: 2023-03-26 | Stop reason: HOSPADM

## 2023-03-26 RX ORDER — POLYETHYLENE GLYCOL 3350 17 G/17G
17 POWDER, FOR SOLUTION ORAL DAILY PRN
Status: DISCONTINUED | OUTPATIENT
Start: 2023-03-26 | End: 2023-03-26 | Stop reason: HOSPADM

## 2023-03-26 RX ORDER — ONDANSETRON 4 MG/1
4 TABLET, ORALLY DISINTEGRATING ORAL
Status: DISCONTINUED | OUTPATIENT
Start: 2023-03-26 | End: 2023-03-26 | Stop reason: HOSPADM

## 2023-03-26 RX ORDER — AMLODIPINE BESYLATE 5 MG/1
10 TABLET ORAL DAILY
Status: DISCONTINUED | OUTPATIENT
Start: 2023-03-26 | End: 2023-03-26 | Stop reason: HOSPADM

## 2023-03-26 RX ORDER — GABAPENTIN 400 MG/1
800 CAPSULE ORAL 3 TIMES DAILY
Status: DISCONTINUED | OUTPATIENT
Start: 2023-03-26 | End: 2023-03-26 | Stop reason: HOSPADM

## 2023-03-26 RX ORDER — MORPHINE SULFATE 4 MG/ML
4 INJECTION INTRAVENOUS ONCE
Status: COMPLETED | OUTPATIENT
Start: 2023-03-26 | End: 2023-03-26

## 2023-03-26 RX ORDER — HYDROMORPHONE HYDROCHLORIDE 1 MG/ML
1 INJECTION, SOLUTION INTRAMUSCULAR; INTRAVENOUS; SUBCUTANEOUS
Status: DISCONTINUED | OUTPATIENT
Start: 2023-03-26 | End: 2023-03-26

## 2023-03-26 RX ORDER — SODIUM CHLORIDE 0.9 % (FLUSH) 0.9 %
5-40 SYRINGE (ML) INJECTION AS NEEDED
Status: DISCONTINUED | OUTPATIENT
Start: 2023-03-26 | End: 2023-03-26 | Stop reason: HOSPADM

## 2023-03-26 RX ORDER — ASPIRIN 81 MG/1
81 TABLET ORAL DAILY
Status: DISCONTINUED | OUTPATIENT
Start: 2023-03-26 | End: 2023-03-26 | Stop reason: HOSPADM

## 2023-03-26 RX ORDER — LOSARTAN POTASSIUM 50 MG/1
100 TABLET ORAL DAILY
Status: DISCONTINUED | OUTPATIENT
Start: 2023-03-26 | End: 2023-03-26 | Stop reason: HOSPADM

## 2023-03-26 RX ADMIN — GABAPENTIN 800 MG: 400 CAPSULE ORAL at 09:01

## 2023-03-26 RX ADMIN — SODIUM CHLORIDE, PRESERVATIVE FREE 10 ML: 5 INJECTION INTRAVENOUS at 05:09

## 2023-03-26 RX ADMIN — LOSARTAN POTASSIUM 100 MG: 50 TABLET, FILM COATED ORAL at 09:01

## 2023-03-26 RX ADMIN — TIOTROPIUM BROMIDE INHALATION SPRAY 2 PUFF: 3.12 SPRAY, METERED RESPIRATORY (INHALATION) at 09:07

## 2023-03-26 RX ADMIN — MORPHINE SULFATE 4 MG: 4 INJECTION, SOLUTION INTRAMUSCULAR; INTRAVENOUS at 02:54

## 2023-03-26 RX ADMIN — AMLODIPINE BESYLATE 10 MG: 5 TABLET ORAL at 09:01

## 2023-03-26 RX ADMIN — HYDROMORPHONE HYDROCHLORIDE 1 MG: 1 INJECTION, SOLUTION INTRAMUSCULAR; INTRAVENOUS; SUBCUTANEOUS at 06:59

## 2023-03-26 RX ADMIN — BUDESONIDE AND FORMOTEROL FUMARATE DIHYDRATE 2 PUFF: 80; 4.5 AEROSOL RESPIRATORY (INHALATION) at 09:07

## 2023-03-26 NOTE — PROGRESS NOTES
[facility]        Subjective     No acute events  Denies pain or bleeding    Objective     Patient Vitals for the past 24 hrs:   Temp Pulse Resp BP SpO2   03/26/23 1102 98.3 °F (36.8 °C) 73 18 135/80 100 %   03/26/23 0906 -- -- -- -- 100 %   03/26/23 0816 97.7 °F (36.5 °C) 70 16 (!) 158/103 97 %   03/26/23 0800 -- 72 -- -- --   03/26/23 0636 98.3 °F (36.8 °C) 70 16 (!) 155/85 100 %   03/26/23 0620 -- 67 16 (!) 118/106 100 %   03/26/23 0500 -- 67 19 -- 100 %   03/26/23 0445 -- -- -- 136/78 --   03/26/23 0400 -- 70 14 129/84 100 %   03/26/23 0315 -- 70 17 139/78 100 %   03/26/23 0200 -- 77 14 114/66 99 %   03/26/23 0110 98.8 °F (37.1 °C) 82 18 (!) 149/98 100 %           PE  GEN - Awake, alert, communicating appropriately. NAD  Pulm - CTAB  CV - RRR  Ext - dressings intact, minimal sanguinous drainage, distal sensation, pulses, ROM intact    Labs  Recent Results (from the past 24 hour(s))   CBC WITH AUTOMATED DIFF    Collection Time: 03/26/23  1:06 AM   Result Value Ref Range    WBC 7.3 4.1 - 11.1 K/uL    RBC 4.57 4.10 - 5.70 M/uL    HGB 13.0 12.1 - 17.0 g/dL    HCT 40.7 36.6 - 50.3 %    MCV 89.1 80.0 - 99.0 FL    MCH 28.4 26.0 - 34.0 PG    MCHC 31.9 30.0 - 36.5 g/dL    RDW 16.3 (H) 11.5 - 14.5 %    PLATELET 007 234 - 675 K/uL    MPV 9.0 8.9 - 12.9 FL    NRBC 0.0 0.0  WBC    ABSOLUTE NRBC 0.00 0.00 - 0.01 K/uL    NEUTROPHILS 45 32 - 75 %    LYMPHOCYTES 39 12 - 49 %    MONOCYTES 12 5 - 13 %    EOSINOPHILS 3 0 - 7 %    BASOPHILS 1 0 - 1 %    IMMATURE GRANULOCYTES 0 0 - 0.5 %    ABS. NEUTROPHILS 3.3 1.8 - 8.0 K/UL    ABS. LYMPHOCYTES 2.8 0.8 - 3.5 K/UL    ABS. MONOCYTES 0.9 0.0 - 1.0 K/UL    ABS. EOSINOPHILS 0.3 0.0 - 0.4 K/UL    ABS. BASOPHILS 0.1 0.0 - 0.1 K/UL    ABS. IMM.  GRANS. 0.0 0.00 - 0.04 K/UL    DF AUTOMATED     METABOLIC PANEL, COMPREHENSIVE    Collection Time: 03/26/23  1:06 AM   Result Value Ref Range    Sodium 136 136 - 145 mmol/L    Potassium 4.0 3.5 - 5.1 mmol/L    Chloride 109 (H) 97 - 108 mmol/L    CO2 22 21 - 32 mmol/L    Anion gap 5 5 - 15 mmol/L    Glucose 126 (H) 65 - 100 mg/dL    BUN 16 6 - 20 mg/dL    Creatinine 1.25 0.70 - 1.30 mg/dL    BUN/Creatinine ratio 13 12 - 20      eGFR >60 >60 ml/min/1.73m2    Calcium 8.5 8.5 - 10.1 mg/dL    Bilirubin, total 0.3 0.2 - 1.0 mg/dL    AST (SGOT) 21 15 - 37 U/L    ALT (SGPT) 19 12 - 78 U/L    Alk. phosphatase 68 45 - 117 U/L    Protein, total 7.7 6.4 - 8.2 g/dL    Albumin 3.2 (L) 3.5 - 5.0 g/dL    Globulin 4.5 (H) 2.0 - 4.0 g/dL    A-G Ratio 0.7 (L) 1.1 - 2.2     TYPE & SCREEN    Collection Time: 03/26/23  1:06 AM   Result Value Ref Range    Crossmatch Expiration 03/29/2023,2359     ABO/Rh(D) O Positive     Antibody screen Negative    PROTHROMBIN TIME + INR    Collection Time: 03/26/23  1:06 AM   Result Value Ref Range    Prothrombin time 14.5 11.9 - 14.6 sec    INR 1.1 0.9 - 1.1     PTT    Collection Time: 03/26/23  1:06 AM   Result Value Ref Range    aPTT 33.7 21.2 - 34.1 sec    aPTT, therapeutic range   82 - 109 sec   HGB & HCT    Collection Time: 03/26/23  5:08 AM   Result Value Ref Range    HGB 11.7 (L) 12.1 - 17.0 g/dL    HCT 36.4 (L) 36.6 - 50.3 %         Assessment     Flor Jones is a 79 y. o.yr old male post recent excision of left UE mass, with bleeding  Bleeding controlled with hemostatic agents  No evidence of further hemorrhage    Plan     [address] [address] for discharge home from surgical point of view  Dressing changes daily   Follow up with his surgeon     15 mins of time was spent with the patient of which > 50% of the time involved face-to-face counseling of the patient regarding the proposed treatment plan.     Christopher Ace MD

## 2023-03-26 NOTE — H&P
GENERAL GENERIC H&P/CONSULT    Subjective:    67M smoker with COPD, HELGA, obesity, GERD, hypertension. Recently moved to [place] from [place]. 3/16/23 underwent operative management at [facility] of a skin tumor of the left upper extremity, postoperatively managed with 3/20/23 wound vac.    3/26/23 presents to hospital with acute bleeding from the left upper extremity. Patient reports he fell asleep on the sofa and awoke with blood everywhere from the surgical site. He noticed that there was increased bloody drainage in the wound VAC canister. Since this morning 2 canisters were filled completely. During the ED course was found to have an arterial bleeding and stabilized with cautery and hemostatic agents. I have been asked to admit to hospital for further stabilization and management of his condition. 3/26/23 surgery  Clary De La Fuente is a 79 y.o. male who is postoperative day #10 from excision of tumors on his left forearm near the elbow at an outside facility. He was left with an open wound, with wound VAC placed. He noticed today that there was increased bloody drainage in the wound VAC canister. Since this morning 2 canisters were filled completely. On presentation to the emergency department, arterial bleeding was noted in the wound bed. I was consulted emergently for control. Prior to my arrival, the emergency physician was able to apply cautery and hemostatic agents. The patient denies significant pain. He is on low-dose aspirin, otherwise he is not on any anticoagulation.     Past Medical History:   Diagnosis Date    Chronic obstructive pulmonary disease (HCC)     GERD (gastroesophageal reflux disease)     History of vascular access device 11/08/2022    [facility] VAT placed 4 fr single PICC, rt basilic 46 cm 2 cm out, arm cir 41 cm    Hypertension     HELGA (obstructive sleep apnea) 12/16/2019    Skin cancer       Past Surgical History:   Procedure Laterality Date    HX UROLOGICAL        Prior to Admission medications    Medication Sig Start Date End Date Taking? Authorizing Provider   losartan (COZAAR) 100 mg tablet TAKE 1 TABLET BY MOUTH EVERY DAY 3/20/23   Joe Montano MD   Breo Ellipta 100-25 mcg/dose inhaler Take 1 Puff by inhalation daily. 11/15/22   Joe Montano MD   tiotropium (Spiriva with HandiHaler) 18 mcg inhalation capsule INHALE THE CONTENTS OF 1 CAPSULE VIA INHALATION DEVICE EVERY DAY 11/15/22   Joe Montano MD   gabapentin (NEURONTIN) 800 mg tablet Take 1 Tablet by mouth three (3) times daily. 11/15/22   Joe Montano MD   ondansetron (ZOFRAN ODT) 8 mg disintegrating tablet Take 1 Tablet by mouth every eight (8) hours as needed for Nausea or Vomiting. 11/8/22   Ely Love PA-C   amLODIPine (NORVASC) 10 mg tablet TAKE 1 TABLET BY MOUTH EVERY DAY 10/17/22   Joe Montano MD   aspirin delayed-release 81 mg tablet TAKE 1 TABLET BY MOUTH EVERY DAY  Patient taking differently: Do not crush, break or chew. Swallow whole. 3/9/20   Joe Montano MD     Allergies   Allergen Reactions    Apple Hives    Pear Hives      Social History     Tobacco Use    Smoking status: Every Day     Packs/day: 0.25     Types: Cigarettes    Smokeless tobacco: Never    Tobacco comments:     3 cigarettes daily   Substance Use Topics    Alcohol use: Yes     Alcohol/week: 6.0 standard drinks     Types: 6 Cans of beer per week     Comment: occasionally      Family History   Problem Relation Age of Onset    Hypertension Mother       Review of Systems   All other systems reviewed and are negative. Objective:    No intake/output data recorded. No intake/output data recorded. Patient Vitals for the past 8 hrs:   BP Temp Pulse Resp SpO2 Height Weight   03/26/23 0315 139/78 -- 70 17 100 % -- --   03/26/23 0200 114/66 -- 77 14 99 % -- --   03/26/23 0110 (!) 149/98 98.8 °F (37.1 °C) 82 18 100 % 6' (1.829 m) 130.2 kg (287 lb)     Physical Exam  Vitals and nursing note reviewed.     General - alert and oriented, no apparent distress, well developed  HEENT - NC/AT, no scleral icterus  Pulm - CTAB, normal inspiratory effort  CV - RRR, no M/R/G  Abd - soft NT ND  Ext - warm, well perfused, no edema. Left forearm open wound posterolateral near elbow, with mild oozing from granulation tissue surfaces  Skin - supple and no rashes  Psychiatric - normal affect, good mood    Labs:    Recent Results (from the past 24 hour(s))   CBC WITH AUTOMATED DIFF    Collection Time: 03/26/23  1:06 AM   Result Value Ref Range    WBC 7.3 4.1 - 11.1 K/uL    RBC 4.57 4.10 - 5.70 M/uL    HGB 13.0 12.1 - 17.0 g/dL    HCT 40.7 36.6 - 50.3 %    MCV 89.1 80.0 - 99.0 FL    MCH 28.4 26.0 - 34.0 PG    MCHC 31.9 30.0 - 36.5 g/dL    RDW 16.3 (H) 11.5 - 14.5 %    PLATELET 601 016 - 328 K/uL    MPV 9.0 8.9 - 12.9 FL    NRBC 0.0 0.0  WBC    ABSOLUTE NRBC 0.00 0.00 - 0.01 K/uL    NEUTROPHILS 45 32 - 75 %    LYMPHOCYTES 39 12 - 49 %    MONOCYTES 12 5 - 13 %    EOSINOPHILS 3 0 - 7 %    BASOPHILS 1 0 - 1 %    IMMATURE GRANULOCYTES 0 0 - 0.5 %    ABS. NEUTROPHILS 3.3 1.8 - 8.0 K/UL    ABS. LYMPHOCYTES 2.8 0.8 - 3.5 K/UL    ABS. MONOCYTES 0.9 0.0 - 1.0 K/UL    ABS. EOSINOPHILS 0.3 0.0 - 0.4 K/UL    ABS. BASOPHILS 0.1 0.0 - 0.1 K/UL    ABS. IMM. GRANS. 0.0 0.00 - 0.04 K/UL    DF AUTOMATED     METABOLIC PANEL, COMPREHENSIVE    Collection Time: 03/26/23  1:06 AM   Result Value Ref Range    Sodium 136 136 - 145 mmol/L    Potassium 4.0 3.5 - 5.1 mmol/L    Chloride 109 (H) 97 - 108 mmol/L    CO2 22 21 - 32 mmol/L    Anion gap 5 5 - 15 mmol/L    Glucose 126 (H) 65 - 100 mg/dL    BUN 16 6 - 20 mg/dL    Creatinine 1.25 0.70 - 1.30 mg/dL    BUN/Creatinine ratio 13 12 - 20      eGFR >60 >60 ml/min/1.73m2    Calcium 8.5 8.5 - 10.1 mg/dL    Bilirubin, total 0.3 0.2 - 1.0 mg/dL    AST (SGOT) 21 15 - 37 U/L    ALT (SGPT) 19 12 - 78 U/L    Alk.  phosphatase 68 45 - 117 U/L    Protein, total 7.7 6.4 - 8.2 g/dL    Albumin 3.2 (L) 3.5 - 5.0 g/dL    Globulin 4.5 (H) 2.0 - 4.0 g/dL    A-G Ratio 0.7 (L) 1.1 - 2.2     TYPE & SCREEN    Collection Time: 03/26/23  1:06 AM   Result Value Ref Range    Crossmatch Expiration 03/29/2023,2359     ABO/Rh(D) [place] Positive     Antibody screen Negative    PROTHROMBIN TIME + INR    Collection Time: 03/26/23  1:06 AM   Result Value Ref Range    Prothrombin time 14.5 11.9 - 14.6 sec    INR 1.1 0.9 - 1.1     PTT    Collection Time: 03/26/23  1:06 AM   Result Value Ref Range    aPTT 33.7 21.2 - 34.1 sec    aPTT, therapeutic range   82 - 109 sec       ECG:   Telemetry monitoring    Assessment:  Active Problems:    Bleeding (3/26/2023)    67M smoker with COPD, HELGA, obesity, GERD, hypertension. Recently moved to [place] from [place]. 3/16/23 underwent operative management at [facility] of a skin tumor of the left upper extremity, postoperatively managed with 3/20/23 wound vac.    3/26/23 presents to hospital with acute bleeding from the left upper extremity. Patient reports he fell asleep on the sofa and awoke with blood everywhere from the surgical site. He noticed that there was increased bloody drainage in the wound VAC canister. Since this morning 2 canisters were filled completely. During the ED course was found to have an arterial bleeding and stabilized with cautery and hemostatic agents. I have been asked to admit to hospital for further stabilization and management of his condition. 3/26/23 surgery  Batsheva Guzman is a 79 y.o. male who is postoperative day #10 from excision of tumors on his left forearm near the elbow at an outside facility. He was left with an open wound, with wound VAC placed. He noticed today that there was increased bloody drainage in the wound VAC canister. Since this morning 2 canisters were filled completely. On presentation to the emergency department, arterial bleeding was noted in the wound bed. I was consulted emergently for control.   Prior to my arrival, the emergency physician was able to apply cautery and hemostatic agents. The patient denies significant pain. He is on low-dose aspirin, otherwise he is not on any anticoagulation. Plan:    1) Arterial bleeding from the surgical site, status post management with cautery and hemostatic agents. Supportive care   Pain management   Wound care inpatient and outpatient   Surgery on board    2) COPD and HELGA.  For the HELGA he brought his CPAP from [place] but has not been functional.     Supportive care with respiratory support as needed   Continue outpatient regimen    3) Cardiovascular issues including hypertension     Telemetry monitoring   Continue outpatient regimen    Amlodipine    Losartan      Signed:  Alfreda Nunn MD 3/26/2023

## 2023-03-26 NOTE — CONSULTS
Pulmonary and Critical Care Consult    Subjective:   Consult Note: 3/26/2023 @no control      Chief Complaint:   Chief Complaint   Patient presents with    Post-Op Problem        This patient has been seen and evaluated at the request of Dr. Farida Bryant    60-year-old obese -American gentleman  I am asked to see for COPD and sleep apnea    Patient admitted to the hospital for bleeding from left upper extremity surgical site where he had recent surgery for removal of skin tumor at [facility]. Active smoker  Half to 1 pack a day for 50 years  COPD diagnosed 5 years back  On Breo and Spiriva  Sleep apnea diagnosed 5 years back  Was given CPAP but has not used it in the last 4 years    Currently on room air    Review of Systems:  A comprehensive review of systems was negative except for that written in the HPI. Past Medical History:   Diagnosis Date    Chronic obstructive pulmonary disease (HCC)     GERD (gastroesophageal reflux disease)     History of vascular access device 11/08/2022    [facility] VAT placed 4 fr single PICC, rt basilic 46 cm 2 cm out, arm cir 41 cm    Hypertension     HELGA (obstructive sleep apnea) 12/16/2019    Skin cancer      Past Surgical History:   Procedure Laterality Date    HX UROLOGICAL        Family History   Problem Relation Age of Onset    Hypertension Mother      Social History     Tobacco Use    Smoking status: Every Day     Packs/day: 0.25     Types: Cigarettes    Smokeless tobacco: Never    Tobacco comments:     3 cigarettes daily   Substance Use Topics    Alcohol use:  Yes     Alcohol/week: 6.0 standard drinks     Types: 6 Cans of beer per week     Comment: occasionally      Current Facility-Administered Medications   Medication Dose Route Frequency Provider Last Rate Last Admin    sodium chloride (NS) flush 5-40 mL  5-40 mL IntraVENous Q8H Tuan Guevara MD   10 mL at 03/26/23 0509    sodium chloride (NS) flush 5-40 mL  5-40 mL IntraVENous PRN Luisana Talamantes MD        acetaminophen (TYLENOL) tablet 650 mg  650 mg Oral Q6H PRN Erika Mendoza MD        Or    acetaminophen (TYLENOL) suppository 650 mg  650 mg Rectal Q6H PRN Erika Mendoza MD        polyethylene glycol [facility]) packet 17 g  17 g Oral DAILY PRN Erika Mendoza MD        ondansetron (ZOFRAN ODT) tablet 4 mg  4 mg Oral Q8H PRN Erika Mendoza MD        Or    ondansetron [facility]) injection 4 mg  4 mg IntraVENous Q6H PRN Erika Mendoza MD        HYDROmorphone (DILAUDID) injection 1 mg  1 mg IntraVENous Q4H PRN Erika Mendoza MD   1 mg at 23 0659    amLODIPine (NORVASC) tablet 10 mg  10 mg Oral DAILY Erika Mendoza MD   10 mg at 23 0901    aspirin delayed-release tablet 81 mg  81 mg Oral DAILY Tuan Up MD        budesonide-formoterol [facility]) 80-4.5 mcg inhaler  2 Puff Inhalation BID RT Erika Mendoza MD   2 Puff at 23 7601    gabapentin (NEURONTIN) capsule 800 mg  800 mg Oral TID Erika Mendoza MD   800 mg at 23 0901    losartan (COZAAR) tablet 100 mg  100 mg Oral DAILY Tuan Up MD   100 mg at 23 0901    tiotropium bromide (SPIRIVA RESPIMAT) 2.5 mcg /actuation  5 mcg Inhalation DAILY Erika Mendoza MD   2 Puff at 23 0907          Allergies   Allergen Reactions    Apple Hives    Pear Hives           Objective:     Blood pressure 135/80, pulse 73, temperature 98.3 °F (36.8 °C), resp. rate 18, height 6' (1.829 m), weight 130.2 kg (287 lb), SpO2 100 %. Temp (24hrs), Av.3 °F (36.8 °C), Min:97.7 °F (36.5 °C), Max:98.8 °F (37.1 °C)      Intake and Output:  Current Shift: No intake/output data recorded. Last 3 Shifts: No intake/output data recorded. No intake or output data in the 24 hours ending 23 1340     Physical Exam:     General: Lying in bed comfortably, no acute distress  Eye: Reactive, symmetric  Throat and Neck: Supple  Lung: Reduced air entry bilaterally with prolonged exhalation but no wheezing. Occasional crackles. Heart: S1+S2.   No murmurs  Abdomen: soft, non-tender. Bowel sounds normal. No masses; obese  Extremities: No edema. Left arm dressing  : Not done  Skin: No cyanosis  Neurologic: A & O x3. Grossly nonfocal  Psychiatric: Appropriate affect; coherent      Lab/Data Review:    Recent Results (from the past 24 hour(s))   CBC WITH AUTOMATED DIFF    Collection Time: 03/26/23  1:06 AM   Result Value Ref Range    WBC 7.3 4.1 - 11.1 K/uL    RBC 4.57 4.10 - 5.70 M/uL    HGB 13.0 12.1 - 17.0 g/dL    HCT 40.7 36.6 - 50.3 %    MCV 89.1 80.0 - 99.0 FL    MCH 28.4 26.0 - 34.0 PG    MCHC 31.9 30.0 - 36.5 g/dL    RDW 16.3 (H) 11.5 - 14.5 %    PLATELET 726 387 - 306 K/uL    MPV 9.0 8.9 - 12.9 FL    NRBC 0.0 0.0  WBC    ABSOLUTE NRBC 0.00 0.00 - 0.01 K/uL    NEUTROPHILS 45 32 - 75 %    LYMPHOCYTES 39 12 - 49 %    MONOCYTES 12 5 - 13 %    EOSINOPHILS 3 0 - 7 %    BASOPHILS 1 0 - 1 %    IMMATURE GRANULOCYTES 0 0 - 0.5 %    ABS. NEUTROPHILS 3.3 1.8 - 8.0 K/UL    ABS. LYMPHOCYTES 2.8 0.8 - 3.5 K/UL    ABS. MONOCYTES 0.9 0.0 - 1.0 K/UL    ABS. EOSINOPHILS 0.3 0.0 - 0.4 K/UL    ABS. BASOPHILS 0.1 0.0 - 0.1 K/UL    ABS. IMM. GRANS. 0.0 0.00 - 0.04 K/UL    DF AUTOMATED     METABOLIC PANEL, COMPREHENSIVE    Collection Time: 03/26/23  1:06 AM   Result Value Ref Range    Sodium 136 136 - 145 mmol/L    Potassium 4.0 3.5 - 5.1 mmol/L    Chloride 109 (H) 97 - 108 mmol/L    CO2 22 21 - 32 mmol/L    Anion gap 5 5 - 15 mmol/L    Glucose 126 (H) 65 - 100 mg/dL    BUN 16 6 - 20 mg/dL    Creatinine 1.25 0.70 - 1.30 mg/dL    BUN/Creatinine ratio 13 12 - 20      eGFR >60 >60 ml/min/1.73m2    Calcium 8.5 8.5 - 10.1 mg/dL    Bilirubin, total 0.3 0.2 - 1.0 mg/dL    AST (SGOT) 21 15 - 37 U/L    ALT (SGPT) 19 12 - 78 U/L    Alk.  phosphatase 68 45 - 117 U/L    Protein, total 7.7 6.4 - 8.2 g/dL    Albumin 3.2 (L) 3.5 - 5.0 g/dL    Globulin 4.5 (H) 2.0 - 4.0 g/dL    A-G Ratio 0.7 (L) 1.1 - 2.2     TYPE & SCREEN    Collection Time: 03/26/23  1:06 AM   Result Value Ref Range    Crossmatch Expiration 03/29/2023,2359     ABO/Rh(D) O Positive     Antibody screen Negative    PROTHROMBIN TIME + INR    Collection Time: 03/26/23  1:06 AM   Result Value Ref Range    Prothrombin time 14.5 11.9 - 14.6 sec    INR 1.1 0.9 - 1.1     PTT    Collection Time: 03/26/23  1:06 AM   Result Value Ref Range    aPTT 33.7 21.2 - 34.1 sec    aPTT, therapeutic range   82 - 109 sec   HGB & HCT    Collection Time: 03/26/23  5:08 AM   Result Value Ref Range    HGB 11.7 (L) 12.1 - 17.0 g/dL    HCT 36.4 (L) 36.6 - 50.3 %       No orders to display       CT Results  (Last 48 hours)      None              Assessment:     1. Arterial bleeding from surgical site  2. COPD  3. Obstructive sleep apnea  4. Active smoker  5. Hypertension  6.   Obesity    Plan:     Patient admitted to the hospital  Will be watched here closely    Currently on room air  Has a history of COPD  Already on Breo and Spiriva  He will continue those inhalers  Use albuterol as needed  Will need PFTs in outpatient after discharge    Patient has sleep apnea  Has been noncompliant with CPAP  His sleep studies were in [place]  There is no objective documentation of type or severity of his sleep apnea  We will need to follow-up in outpatient for sleep studies so he can get back on his CPAP    DVT and GI prophylaxis    Appointment for follow-up given to the patient     Questions of patient were answered at bedside in detail  Case discussed in detail with RN, RT, and care team  Thank you for involving me in the care of this patient  I will follow with you closely during hospitalization    Esperanza Gallo MD  [facility]  3/26/2023  1:40 PM

## 2023-03-26 NOTE — CONSULTS
[facility] Consultation for: hemorrhage from recent surgical site    Requesting Physician: Dr. Fauzia Max    History of Present Illness:      Janina Christianson is a 79 y.o. male who is postoperative day #10 from excision of tumors on his left forearm near the elbow at an outside facility. He was left with an open wound, with wound VAC placed. He noticed today that there was increased bloody drainage in the wound VAC canister. Since this morning 2 canisters were filled completely. On presentation to the emergency department, arterial bleeding was noted in the wound bed. I was consulted emergently for control. Prior to my arrival, the emergency physician was able to apply cautery and hemostatic agents. The patient denies significant pain. He is on low-dose aspirin, otherwise he is not on any anticoagulation. Past Medical History:   Diagnosis Date    Chronic obstructive pulmonary disease (HCC)     GERD (gastroesophageal reflux disease)     History of vascular access device 11/08/2022    [facility] VAT placed 4 fr single PICC, rt basilic 46 cm 2 cm out, arm cir 41 cm    Hypertension     HELGA (obstructive sleep apnea) 12/16/2019       Past Surgical History:   Procedure Laterality Date    HX UROLOGICAL         Social History     Socioeconomic History    Marital status:      Spouse name: Not on file    Number of children: Not on file    Years of education: Not on file    Highest education level: Not on file   Occupational History    Not on file   Tobacco Use    Smoking status: Former     Packs/day: 0.25     Types: Cigarettes    Smokeless tobacco: Never    Tobacco comments:     3 cigarettes daily   Vaping Use    Vaping Use: Never used   Substance and Sexual Activity    Alcohol use:  Yes     Alcohol/week: 6.0 standard drinks     Types: 6 Cans of beer per week     Comment: occasionally    Drug use: Not Currently     Types: Cocaine    Sexual activity: Not on file   Other Topics Concern    Not on file   Social History Narrative    Not on file     Social Determinants of Health     Financial Resource Strain: Low Risk     Difficulty of Paying Living Expenses: Not hard at all   Food Insecurity: No Food Insecurity    Worried About Running Out of Food in the Last Year: Never true    Ran Out of Food in the Last Year: Never true   Transportation Needs: Not on file   Physical Activity: Not on file   Stress: Not on file   Social Connections: Not on file   Intimate Partner Violence: Not on file   Housing Stability: Not on file       Family History   Problem Relation Age of Onset    Hypertension Mother        No current facility-administered medications for this encounter. Current Outpatient Medications:     losartan (COZAAR) 100 mg tablet, TAKE 1 TABLET BY MOUTH EVERY DAY, Disp: 90 Tablet, Rfl: 1    Breo Ellipta 100-25 mcg/dose inhaler, Take 1 Puff by inhalation daily. , Disp: 3 Each, Rfl: 3    tiotropium (Spiriva with HandiHaler) 18 mcg inhalation capsule, INHALE THE CONTENTS OF 1 CAPSULE VIA INHALATION DEVICE EVERY DAY, Disp: 90 Capsule, Rfl: 1    gabapentin (NEURONTIN) 800 mg tablet, Take 1 Tablet by mouth three (3) times daily. , Disp: 270 Tablet, Rfl: 1    ondansetron (ZOFRAN ODT) 8 mg disintegrating tablet, Take 1 Tablet by mouth every eight (8) hours as needed for Nausea or Vomiting., Disp: 20 Tablet, Rfl: 0    amLODIPine (NORVASC) 10 mg tablet, TAKE 1 TABLET BY MOUTH EVERY DAY, Disp: 90 Tablet, Rfl: 1    diclofenac EC (VOLTAREN) 75 mg EC tablet, TAKE 1 TABLET BY MOUTH TWICE DAILY FOR 14 DAYS (Patient not taking: No sig reported), Disp: 60 Tablet, Rfl: 2    cyclobenzaprine (FLEXERIL) 10 mg tablet, Take 10 mg by mouth three (3) times daily as needed. (Patient not taking: No sig reported), Disp: , Rfl:     aspirin delayed-release 81 mg tablet, TAKE 1 TABLET BY MOUTH EVERY DAY (Patient taking differently: Do not crush, break or chew.   Swallow whole.), Disp: 90 Tab, Rfl: 5    Allergies   Allergen Reactions    Apple Hives    Pear Hives       ROS   Constitutional: negative  Ears, Nose, Mouth, Throat, and Face: negative  Respiratory: negative  Cardiovascular: negative  Gastrointestinal: negative  Genitourinary:negative  Integument/Breast: negative  Hematologic/Lymphatic: negative  Behavioral/Psychiatric: negative  Allergic/Immunologic: negative      Physical Exam:     Visit Vitals  BP (!) 149/98 (BP 1 Location: Right upper arm, BP Patient Position: At rest)   Pulse 82   Temp 98.8 °F (37.1 °C)   Resp 18   Ht 6' (1.829 m)   Wt 287 lb (130.2 kg)   SpO2 100%   BMI 38.92 kg/m²       General - alert and oriented, no apparent distress, well developed  HEENT - NC/AT, no scleral icterus  Pulm - CTAB, normal inspiratory effort  CV - RRR, no M/R/G  Abd - soft NT ND  Ext - warm, well perfused, no edema. Left forearm open wound posterolateral near elbow, with mild oozing from granulation tissue surfaces  Skin - supple and no rashes  Psychiatric - normal affect, good mood    Labs  Recent Results (from the past 24 hour(s))   CBC WITH AUTOMATED DIFF    Collection Time: 03/26/23  1:06 AM   Result Value Ref Range    WBC 7.3 4.1 - 11.1 K/uL    RBC 4.57 4.10 - 5.70 M/uL    HGB 13.0 12.1 - 17.0 g/dL    HCT 40.7 36.6 - 50.3 %    MCV 89.1 80.0 - 99.0 FL    MCH 28.4 26.0 - 34.0 PG    MCHC 31.9 30.0 - 36.5 g/dL    RDW 16.3 (H) 11.5 - 14.5 %    PLATELET 815 391 - 716 K/uL    MPV 9.0 8.9 - 12.9 FL    NRBC 0.0 0.0  WBC    ABSOLUTE NRBC 0.00 0.00 - 0.01 K/uL    NEUTROPHILS 45 32 - 75 %    LYMPHOCYTES 39 12 - 49 %    MONOCYTES 12 5 - 13 %    EOSINOPHILS 3 0 - 7 %    BASOPHILS 1 0 - 1 %    IMMATURE GRANULOCYTES 0 0 - 0.5 %    ABS. NEUTROPHILS 3.3 1.8 - 8.0 K/UL    ABS. LYMPHOCYTES 2.8 0.8 - 3.5 K/UL    ABS. MONOCYTES 0.9 0.0 - 1.0 K/UL    ABS. EOSINOPHILS 0.3 0.0 - 0.4 K/UL    ABS. BASOPHILS 0.1 0.0 - 0.1 K/UL    ABS. IMM.  GRANS. 0.0 0.00 - 0.04 K/UL    DF AUTOMATED     METABOLIC PANEL, COMPREHENSIVE    Collection Time: 03/26/23  1:06 AM   Result Value Ref Range    Sodium 136 136 - 145 mmol/L    Potassium 4.0 3.5 - 5.1 mmol/L    Chloride 109 (H) 97 - 108 mmol/L    CO2 22 21 - 32 mmol/L    Anion gap 5 5 - 15 mmol/L    Glucose 126 (H) 65 - 100 mg/dL    BUN 16 6 - 20 mg/dL    Creatinine 1.25 0.70 - 1.30 mg/dL    BUN/Creatinine ratio 13 12 - 20      eGFR >60 >60 ml/min/1.73m2    Calcium 8.5 8.5 - 10.1 mg/dL    Bilirubin, total 0.3 0.2 - 1.0 mg/dL    AST (SGOT) 21 15 - 37 U/L    ALT (SGPT) 19 12 - 78 U/L    Alk. phosphatase 68 45 - 117 U/L    Protein, total 7.7 6.4 - 8.2 g/dL    Albumin 3.2 (L) 3.5 - 5.0 g/dL    Globulin 4.5 (H) 2.0 - 4.0 g/dL    A-G Ratio 0.7 (L) 1.1 - 2.2     PROTHROMBIN TIME + INR    Collection Time: 03/26/23  1:06 AM   Result Value Ref Range    Prothrombin time 14.5 11.9 - 14.6 sec    INR 1.1 0.9 - 1.1     PTT    Collection Time: 03/26/23  1:06 AM   Result Value Ref Range    aPTT 33.7 21.2 - 34.1 sec    aPTT, therapeutic range   82 - 109 sec             Assessment:     Amy Mo is a 79 y.o. male with recent excision of malignant tumors of his left arm, presenting with significant hemorrhage from the site. Initially, this was uncontrolled arterial bleeding per report. At the time of my evaluation, there was no continued significant hemorrhage. Recommendations:     1. The tourniquet was taken down, to confirm that the wound remained hemostatic. Surgicel was placed over the open wound surfaces, and the wound was dressed with nonadherent gauze, Kerlix and Ace wrap. Recommend observation overnight, with close follow-up with the patient's surgeon after discharge. 45 mins of time was spent with the patient of which > 50% of the time involved face-to-face counseling of the patient regarding the proposed treatment plan.     Rosalinda Amado MD

## 2023-03-26 NOTE — PROGRESS NOTES
DC order noted for [facility]. CM met with patient and spouse to get [facility] choice for patient to continue services for wound care. Patient states that the name of the agency is written down at home and he will call back with name of agency. IMM letter signed by patient.    -----------    [phone] received from patient stating that the name of the [facility] agency is [facility] and they have already talked to the agency and they're going to start tomorrow 3/26/23. CM sent referral and orders.

## 2023-03-26 NOTE — ROUTINE PROCESS
TRANSFER - OUT REPORT:    Written report given to KOFI Guan on Amy Mo  being transferred to [phone] for routine progression of care       Report consisted of patients Situation, Background, Assessment and   Recommendations(SBAR). Information from the following report(s) SBAR and ED Summary was reviewed with the receiving nurse. Lines:   Peripheral IV 03/26/23 Right Hand (Active)   Site Assessment Clean, dry, & intact 03/26/23 0115   Dressing Status Clean, dry, & intact 03/26/23 0115   Dressing Type Tape;Transparent 03/26/23 0115   Hub Color/Line Status Pink;Patent; Flushed 03/26/23 0115   Action Taken Blood drawn 03/26/23 0115        Opportunity for questions and clarification was provided.       Patient transported with:   Monitor  Tech

## 2023-03-26 NOTE — ROUTINE PROCESS
Bedside shift change report given to Gerry RASHEED (oncoming nurse) by Maria R Hughes RN (offgoing nurse). Report included the following information SBAR, Intake/Output, MAR, and Recent Results.

## 2023-03-26 NOTE — PROGRESS NOTES
Informed pt and his wife he needed to call for a his follow up appt's during normal business hours tomorrow. IV removed.  was involved at time of DC, pt did not know name of Home Health agency and was going to follow up with Franchesca Cook  with more information. Pt did not have any questions at time of discharge. Wound care was done prior to him leaving.

## 2023-03-26 NOTE — ED TRIAGE NOTES
Per EMS: pt with left elbow surgery on 03/16/2023 for removal of tumors; pt with wound-vac placed on 03/20/2023; pt states he fell asleep on his couch and awoke with blood everywhere from his surgical site    Patient arrives to ED with puddled blood in his wound vac dressing, active bleeding    Dr. Maranda Butler at bedside; tourniquet placed on right upper arm

## 2023-03-26 NOTE — DISCHARGE SUMMARY
Hospitalist Discharge Summary     Patient ID:    Krishna Tinajero  [id number]  89 y.o.  1955    Admit date: 3/26/2023    Discharge date : 3/26/2023        Final Diagnoses: Active Problems:    Bleeding (3/26/2023)      Wound dehiscence (3/26/2023)      Reason for Hospitalization:  Wound dehiscence, bleeding    Hospital Course:   Pt admitted for left upper arm bleeding due to wound vac dehiscence, recently had a removal of skin tumor in his left arm, wound vac was applied and pt reported [address] changed the wound vac and applied the dressing at the medial aspect of the wound, and overnight became detached and wound had excessive bleeding and he came to the hospital. The wound was cauterized and given hemostatic agent to stop the bleeding. Hemoglobin has remained stable and there has not been any further bleeding from the site. General surgery has cleared patient for discharge home today and resume home health for wound care treatment . Pt is stable for discharge home to resume home health for wound care and treatment. Pt to follow up with PCP and orthopedic surgery after discharge as listed. Pt is in agreement with plan. Discharge Medications:   Current Discharge Medication List        START taking these medications    Details   acetaminophen (TYLENOL) 325 mg tablet Take 2 Tablets by mouth every four (4) hours as needed for Fever or Pain for up to 30 days. Qty: 180 Tablet, Refills: 0  Start date: 3/26/2023, End date: 4/25/2023           CONTINUE these medications which have NOT CHANGED    Details   losartan (COZAAR) 100 mg tablet TAKE 1 TABLET BY MOUTH EVERY DAY  Qty: 90 Tablet, Refills: 1      Breo Ellipta 100-25 mcg/dose inhaler Take 1 Puff by inhalation daily.   Qty: 3 Each, Refills: 3    Associated Diagnoses: Pulmonary emphysema, unspecified emphysema type ([address]      tiotropium (Spiriva with HandiHaler) 18 mcg inhalation capsule INHALE THE CONTENTS OF 1 CAPSULE VIA INHALATION DEVICE EVERY DAY  Qty: 90 Capsule, Refills: 1      gabapentin (NEURONTIN) 800 mg tablet Take 1 Tablet by mouth three (3) times daily. Qty: 270 Tablet, Refills: 1    Associated Diagnoses: DDD (degenerative disc disease), cervical      ondansetron (ZOFRAN ODT) 8 mg disintegrating tablet Take 1 Tablet by mouth every eight (8) hours as needed for Nausea or Vomiting. Qty: 20 Tablet, Refills: 0      amLODIPine (NORVASC) 10 mg tablet TAKE 1 TABLET BY MOUTH EVERY DAY  Qty: 90 Tablet, Refills: 1      aspirin delayed-release 81 mg tablet TAKE 1 TABLET BY MOUTH EVERY DAY  Qty: 90 Tab, Refills: 5               Follow up Care:    1. Colan Fleischer, MD in 2 weeks. Follow-up Information       Follow up With Specialties Details Why Contact Info    Colan Fleischer, MD Family Medicine Follow up in 2 week(s)  [id number] [facility] [id number] [phone]      [facility] Department Of Orthopedic Surgery  Follow up Follow up with Dr Yo Mendoza for wound [facility]  [phone]              * Follow-up Care/Patient Instructions: Activity: Activity as tolerated  Diet: Regular Diet  Wound Care: As directed      Condition at Discharge:  Stable  __________________________________________________________________    [facility]  [address] [facility]  ____________________________________________________________________    Code Status:  Full Code  ___________________________________________________________________    Discharge Exam:  Patient seen and examined by me on discharge day. Physical Exam  Constitutional:       General: He is not in acute distress. Appearance: He is obese. Cardiovascular:      Rate and Rhythm: Normal rate and regular rhythm. Pulses: Normal pulses. Heart sounds: Normal heart sounds. Pulmonary:      Effort: Pulmonary effort is normal.   Abdominal:      Palpations: Abdomen is soft. Musculoskeletal:         General: Normal range of motion.    Skin: General: Skin is warm and dry. Neurological:      Mental Status: He is oriented to person, place, and time. Psychiatric:         Mood and Affect: Mood normal.         Behavior: Behavior normal.        CONSULTATIONS: General Surgery    Significant Diagnostic Studies:   Recent Results (from the past 24 hour(s))   CBC WITH AUTOMATED DIFF    Collection Time: 03/26/23  1:06 AM   Result Value Ref Range    WBC 7.3 4.1 - 11.1 K/uL    RBC 4.57 4.10 - 5.70 M/uL    HGB 13.0 12.1 - 17.0 g/dL    HCT 40.7 36.6 - 50.3 %    MCV 89.1 80.0 - 99.0 FL    MCH 28.4 26.0 - 34.0 PG    MCHC 31.9 30.0 - 36.5 g/dL    RDW 16.3 (H) 11.5 - 14.5 %    PLATELET 302 662 - 295 K/uL    MPV 9.0 8.9 - 12.9 FL    NRBC 0.0 0.0  WBC    ABSOLUTE NRBC 0.00 0.00 - 0.01 K/uL    NEUTROPHILS 45 32 - 75 %    LYMPHOCYTES 39 12 - 49 %    MONOCYTES 12 5 - 13 %    EOSINOPHILS 3 0 - 7 %    BASOPHILS 1 0 - 1 %    IMMATURE GRANULOCYTES 0 0 - 0.5 %    ABS. NEUTROPHILS 3.3 1.8 - 8.0 K/UL    ABS. LYMPHOCYTES 2.8 0.8 - 3.5 K/UL    ABS. MONOCYTES 0.9 0.0 - 1.0 K/UL    ABS. EOSINOPHILS 0.3 0.0 - 0.4 K/UL    ABS. BASOPHILS 0.1 0.0 - 0.1 K/UL    ABS. IMM. GRANS. 0.0 0.00 - 0.04 K/UL    DF AUTOMATED     METABOLIC PANEL, COMPREHENSIVE    Collection Time: 03/26/23  1:06 AM   Result Value Ref Range    Sodium 136 136 - 145 mmol/L    Potassium 4.0 3.5 - 5.1 mmol/L    Chloride 109 (H) 97 - 108 mmol/L    CO2 22 21 - 32 mmol/L    Anion gap 5 5 - 15 mmol/L    Glucose 126 (H) 65 - 100 mg/dL    BUN 16 6 - 20 mg/dL    Creatinine 1.25 0.70 - 1.30 mg/dL    BUN/Creatinine ratio 13 12 - 20      eGFR >60 >60 ml/min/1.73m2    Calcium 8.5 8.5 - 10.1 mg/dL    Bilirubin, total 0.3 0.2 - 1.0 mg/dL    AST (SGOT) 21 15 - 37 U/L    ALT (SGPT) 19 12 - 78 U/L    Alk.  phosphatase 68 45 - 117 U/L    Protein, total 7.7 6.4 - 8.2 g/dL    Albumin 3.2 (L) 3.5 - 5.0 g/dL    Globulin 4.5 (H) 2.0 - 4.0 g/dL    A-G Ratio 0.7 (L) 1.1 - 2.2     TYPE & SCREEN    Collection Time: 03/26/23  1:06 AM   Result Value Ref Range    Crossmatch Expiration 03/29/2023,2358     ABO/Rh(D) O Positive     Antibody screen Negative    PROTHROMBIN TIME + INR    Collection Time: 03/26/23  1:06 AM   Result Value Ref Range    Prothrombin time 14.5 11.9 - 14.6 sec    INR 1.1 0.9 - 1.1     PTT    Collection Time: 03/26/23  1:06 AM   Result Value Ref Range    aPTT 33.7 21.2 - 34.1 sec    aPTT, therapeutic range   82 - 109 sec   HGB & HCT    Collection Time: 03/26/23  5:08 AM   Result Value Ref Range    HGB 11.7 (L) 12.1 - 17.0 g/dL    HCT 36.4 (L) 36.6 - 50.3 %     No orders to display       Discharge: time spent 35 minutes in discharge  Education and counseling.      Signed:  Kori Ferrell NP  3/26/2023  12:49 PM

## 2023-03-26 NOTE — PROGRESS NOTES
Problem: Anemia Care Plan (Adult and Pediatric)  Goal: *Labs within defined limits  3/26/2023 1305 by Evert Tellez, LPN  Outcome: Resolved/Met  3/26/2023 0951 by Evert Tellez, LPN  Outcome: Progressing Towards Goal  Goal: *Tolerates increased activity  3/26/2023 1305 by Evert Tellez, LPN  Outcome: Resolved/Met  3/26/2023 0951 by Evert Tellez, LPN  Outcome: Progressing Towards Goal     Problem: Patient Education: Go to Patient Education Activity  Goal: Patient/Family Education  3/26/2023  by Evert Tellez, LPN  Outcome: Resolved/Met  3/26/2023 0951 by Evert Tellez, LPN  Outcome: Progressing Towards Goal

## 2023-03-26 NOTE — PROGRESS NOTES
Problem: Anemia Care Plan (Adult and Pediatric)  Goal: *Labs within defined limits  Outcome: Progressing Towards Goal     Problem: Anemia Care Plan (Adult and Pediatric)  Goal: *Labs within defined limits  Outcome: Progressing Towards Goal  Goal: *Tolerates increased activity  Outcome: Progressing Towards Goal     Problem: Patient Education: Go to Patient Education Activity  Goal: Patient/Family Education  Outcome: Progressing Towards Goal

## 2023-03-26 NOTE — ED PROVIDER NOTES
[facility] EMERGENCY DEPT  EMERGENCY DEPARTMENT HISTORY AND PHYSICAL EXAM      Date: 3/26/2023  Patient Name: Raphael Andrew  MRN: [id number]  [place]: 1955  Date of evaluation: 3/26/2023  Provider: Darshan Ulloa MD   Note Started: 5:00 AM 3/26/23    HISTORY OF PRESENT ILLNESS     Chief Complaint   Patient presents with    Post-Op Problem       History Provided By: Patient    HPI: Raphael Andrew, 79 y.o. male with history as reported below presenting to the ED for bleeding from his left arm. Patient states she had a possibly cancerous tumor removed about a week ago and had a wound VAC placed. He reports he woke up today in a pool of blood and called EMS. PAST MEDICAL HISTORY   Past Medical History:  Past Medical History:   Diagnosis Date    Chronic obstructive pulmonary disease (HCC)     GERD (gastroesophageal reflux disease)     History of vascular access device 11/08/2022    [facility] VAT placed 4 fr single PICC, rt basilic 46 cm 2 cm out, arm cir 41 cm    Hypertension     HELGA (obstructive sleep apnea) 12/16/2019    Skin cancer        Past Surgical History:  Past Surgical History:   Procedure Laterality Date    HX UROLOGICAL         Family History:  Family History   Problem Relation Age of Onset    Hypertension Mother        Social History:  Social History     Tobacco Use    Smoking status: Every Day     Packs/day: 0.25     Types: Cigarettes    Smokeless tobacco: Never    Tobacco comments:     3 cigarettes daily   Vaping Use    Vaping Use: Never used   Substance Use Topics    Alcohol use: Yes     Alcohol/week: 6.0 standard drinks     Types: 6 Cans of beer per week     Comment: occasionally    Drug use: Not Currently     Types: Cocaine       Allergies:   Allergies   Allergen Reactions    Apple Hives    Pear Hives       PCP: Gretel Jauregui MD    Current Meds:   Previous Medications    AMLODIPINE (NORVASC) 10 MG TABLET    TAKE 1 TABLET BY MOUTH EVERY DAY    ASPIRIN DELAYED-RELEASE 81 MG TABLET    TAKE 1 TABLET BY MOUTH EVERY DAY    BREO ELLIPTA 100-25 MCG/DOSE INHALER    Take 1 Puff by inhalation daily. GABAPENTIN (NEURONTIN) 800 MG TABLET    Take 1 Tablet by mouth three (3) times daily. LOSARTAN (COZAAR) 100 MG TABLET    TAKE 1 TABLET BY MOUTH EVERY DAY    ONDANSETRON (ZOFRAN ODT) 8 MG DISINTEGRATING TABLET    Take 1 Tablet by mouth every eight (8) hours as needed for Nausea or Vomiting. TIOTROPIUM ([facility]) 18 MCG INHALATION CAPSULE    INHALE THE CONTENTS OF 1 CAPSULE VIA INHALATION DEVICE EVERY DAY       REVIEW OF SYSTEMS     Positives and Pertinent negatives as per HPI. PHYSICAL EXAM     ED Triage Vitals [03/26/23 0110]   ED Encounter Vitals Group      BP (!) 149/98      Pulse (Heart Rate) 82      Resp Rate 18      Temp 98.8 °F (37.1 °C)      Temp src       O2 Sat (%) 100 %      Weight 287 lb      Height 6'      Physical Exam  Vitals and nursing note reviewed. Constitutional:       General: He is not in acute distress. Appearance: Normal appearance. HENT:      Head: Normocephalic. Nose: Nose normal.      Mouth/Throat:      Mouth: Mucous membranes are moist.   Eyes:      Conjunctiva/sclera: Conjunctivae normal.   Cardiovascular:      Rate and Rhythm: Normal rate. Pulses: Normal pulses. Pulmonary:      Effort: Pulmonary effort is normal.      Breath sounds: Normal breath sounds. Abdominal:      General: Abdomen is flat. Musculoskeletal:         General: No deformity. Cervical back: Neck supple. Comments: Left arm with large wound with bone exposed after wound VAC removed. There is a single pulsatile arterial bleed. Skin:     General: Skin is warm. Neurological:      General: No focal deficit present. Mental Status: He is alert.    Psychiatric:         Mood and Affect: Mood normal.        ED COURSE and DIFFERENTIAL DIAGNOSIS/MDM   Records Reviewed (source and summary of external notes): Prior medical records    Vitals:    Vitals:    03/26/23 0110 03/26/23 0200 03/26/23 0315 03/26/23 0400   BP: (!) 149/98 114/66 139/78 129/84   Pulse: 82 77 70 70   Resp: 18 14 17 14   Temp: 98.8 °F (37.1 °C)      SpO2: 100% 99% 100% 100%   Weight: 130.2 kg (287 lb)      Height: 6' (1.829 m)          CC/HPI Summary, DDx, ED Course, and Reassessment: 72-year-old male presenting with arterial bleeding. He is not on any anticoagulation. Wound VAC dressing was removed, tourniquet was applied on the left arm and left in place for short amount of time while the wound was cleaned. When the tourniquet was released there is noted pulsatile arterial blood, tried quick clot without any success. Tourniquet was reapplied and the arterial bleeding was cauterized and quick clot was applied. The tourniquet was released and bleeding ceased. Discussed case with surgery on-call who evaluated the patient and recommends an admit for observation. Discussed with the patient, hemoglobin is unremarkable case with the hospitalist who accepts. Patient was given the following medications:  Medications   sodium chloride (NS) flush 5-40 mL (has no administration in time range)   sodium chloride (NS) flush 5-40 mL (has no administration in time range)   acetaminophen (TYLENOL) tablet 650 mg (has no administration in time range)     Or   acetaminophen (TYLENOL) suppository 650 mg (has no administration in time range)   polyethylene glycol (MIRALAX) packet 17 g (has no administration in time range)   ondansetron (ZOFRAN ODT) tablet 4 mg (has no administration in time range)     Or   ondansetron (ZOFRAN) injection 4 mg (has no administration in time range)   HYDROmorphone (DILAUDID) injection 1 mg (has no administration in time range)   amLODIPine (NORVASC) tablet 10 mg (has no administration in time range)   aspirin delayed-release tablet 81 mg (has no administration in time range)   . PHARMACY TO SUBSTITUTE PER PROTOCOL (Reordered from: Breo Ellipta 100-25 mcg/dose inhaler) (has no administration in time range)   . PHARMACY TO SUBSTITUTE PER PROTOCOL (Reordered from: gabapentin (NEURONTIN) 800 mg tablet) (has no administration in time range)   losartan (COZAAR) tablet 100 mg (has no administration in time range)   . PHARMACY TO SUBSTITUTE PER PROTOCOL (Reordered from: tiotropium (Spiriva with HandiHaler) 18 mcg inhalation capsule) (has no administration in time range)   morphine injection 4 mg (4 mg IntraVENous Given 3/26/23 0254)       CONSULTS: (Who and What was discussed)  IP CONSULT TO PULMONOLOGY       Social Determinants affecting Dx or Tx:       Disposition Considerations (Tests not done, Shared Decision Making, Pt Expectation of Test or Treatment.):      SCREENINGS               No data recorded        LAB, EKG AND DIAGNOSTIC RESULTS   Labs:  Recent Results (from the past 12 hour(s))   CBC WITH AUTOMATED DIFF    Collection Time: 03/26/23  1:06 AM   Result Value Ref Range    WBC 7.3 4.1 - 11.1 K/uL    RBC 4.57 4.10 - 5.70 M/uL    HGB 13.0 12.1 - 17.0 g/dL    HCT 40.7 36.6 - 50.3 %    MCV 89.1 80.0 - 99.0 FL    MCH 28.4 26.0 - 34.0 PG    MCHC 31.9 30.0 - 36.5 g/dL    RDW 16.3 (H) 11.5 - 14.5 %    PLATELET 210 234 - 387 K/uL    MPV 9.0 8.9 - 12.9 FL    NRBC 0.0 0.0  WBC    ABSOLUTE NRBC 0.00 0.00 - 0.01 K/uL    NEUTROPHILS 45 32 - 75 %    LYMPHOCYTES 39 12 - 49 %    MONOCYTES 12 5 - 13 %    EOSINOPHILS 3 0 - 7 %    BASOPHILS 1 0 - 1 %    IMMATURE GRANULOCYTES 0 0 - 0.5 %    ABS. NEUTROPHILS 3.3 1.8 - 8.0 K/UL    ABS. LYMPHOCYTES 2.8 0.8 - 3.5 K/UL    ABS. MONOCYTES 0.9 0.0 - 1.0 K/UL    ABS. EOSINOPHILS 0.3 0.0 - 0.4 K/UL    ABS. BASOPHILS 0.1 0.0 - 0.1 K/UL    ABS. IMM.  GRANS. 0.0 0.00 - 0.04 K/UL    DF AUTOMATED     METABOLIC PANEL, COMPREHENSIVE    Collection Time: 03/26/23  1:06 AM   Result Value Ref Range    Sodium 136 136 - 145 mmol/L    Potassium 4.0 3.5 - 5.1 mmol/L    Chloride 109 (H) 97 - 108 mmol/L    CO2 22 21 - 32 mmol/L    Anion gap 5 5 - 15 mmol/L    Glucose 126 (H) 65 - 100 mg/dL    BUN 16 6 - 20 mg/dL    Creatinine 1.25 0.70 - 1.30 mg/dL    BUN/Creatinine ratio 13 12 - 20      eGFR >60 >60 ml/min/1.73m2    Calcium 8.5 8.5 - 10.1 mg/dL    Bilirubin, total 0.3 0.2 - 1.0 mg/dL    AST (SGOT) 21 15 - 37 U/L    ALT (SGPT) 19 12 - 78 U/L    Alk. phosphatase 68 45 - 117 U/L    Protein, total 7.7 6.4 - 8.2 g/dL    Albumin 3.2 (L) 3.5 - 5.0 g/dL    Globulin 4.5 (H) 2.0 - 4.0 g/dL    A-G Ratio 0.7 (L) 1.1 - 2.2     TYPE & SCREEN    Collection Time: 03/26/23  1:06 AM   Result Value Ref Range    Crossmatch Expiration 03/29/2023,2359     ABO/Rh(D) Alver Chol Positive     Antibody screen Negative    PROTHROMBIN TIME + INR    Collection Time: 03/26/23  1:06 AM   Result Value Ref Range    Prothrombin time 14.5 11.9 - 14.6 sec    INR 1.1 0.9 - 1.1     PTT    Collection Time: 03/26/23  1:06 AM   Result Value Ref Range    aPTT 33.7 21.2 - 34.1 sec    aPTT, therapeutic range   82 - 109 sec       Radiologic Studies:  Non-plain film images such as CT, Ultrasound and MRI are read by the radiologist. Plain radiographic images are visualized and preliminarily interpreted by the ED Provider with the below findings:      Interpretation per the Radiologist below, if available at the time of this note:  No results found. PROCEDURES   Unless otherwise noted below, none. Performed by: Jeannine Conklin MD   Procedures    . [facility]  CRITICAL CARE TIME   CRITICAL CARE NOTE :    5:04 AM    IMPENDING DETERIORATION -Cardiovascular  ASSOCIATED RISK FACTORS - Bleeding  MANAGEMENT- Bedside Assessment and Supervision of Care  INTERPRETATION -  Blood Pressure  INTERVENTIONS - hemodynamic mngmt  CASE REVIEW - Hospitalist/Intensivist  TREATMENT RESPONSE -Stable  PERFORMED BY - Self    NOTES   :  I have spent 45 minutes of critical care time involved in lab review, consultations with specialist, family decision- making, bedside attention and documentation. This time excludes time spent in any separate billed procedures.   During this entire length of time I was immediately available to the patient . Joselito Fair MD      FINAL IMPRESSION     1. Bleeding          DISPOSITION/PLAN   Admitted    Admit Note: Pt is being admitted by hospitalist. The results of their tests and reason(s) for their admission have been discussed with pt and/or available family. They convey agreement and understanding for the need to be admitted and for the admission diagnosis. PATIENT REFERRED TO:  Follow-up Information    None           DISCHARGE MEDICATIONS:  Current Discharge Medication List            DISCONTINUED MEDICATIONS:  Current Discharge Medication List          I am the Primary Clinician of Record: Joselito Fair MD (electronically signed)    (Please note that parts of this dictation were completed with voice recognition software. Quite often unanticipated grammatical, syntax, homophones, and other interpretive errors are inadvertently transcribed by the computer software. Please disregards these errors.  Please excuse any errors that have escaped final proofreading.)

## 2023-03-26 NOTE — ROUTINE PROCESS
Patient arrived on the unit via wheelchair. Patient ambulated to hospital bed with no incident. Vitals completed, pain 7/10. Patient unable have pain medication until 7am, patient verbalized understanding. Dressing to let elbow clean, dry, and intact. No other complaints at this time. Patient wife at bedside and has wound vac with her. Bed locked and set to lowest position, belongings placed within reach, and call light given to patient.

## 2023-03-27 ENCOUNTER — PATIENT OUTREACH (OUTPATIENT)
Dept: CASE MANAGEMENT | Age: 68
End: 2023-03-27

## 2023-03-27 NOTE — PROGRESS NOTES
Care Transitions Initial Call    Call within 2 business days of discharge: Yes     Patient Current Location: [address] [place] [address]    Patient: Flor Jones Patient : 1955 MRN: [id number]    Last Discharge  [address]       Date Complaint Diagnosis Description Type Department Provider    3/26/23 Post-Op Problem Bleeding ED to Hosp-Admission (Discharged) (ADMIT) [id number] Jermaine Richard MD; Amrit Bernstein, . .. Was this an external facility discharge? No     Challenges to be reviewed by the provider   Additional needs identified to be addressed with provider: no           Method of communication with provider : none    Discussed COVID-19 related testing which was not done at this time. Advance Care Planning:   Does patient have an Advance Directive:  Primary Decision Maker on file . Inpatient Readmission Risk score: Unplanned Readmit Risk Score: 10.6    Was this a readmission? no   Patient stated reason for the admission: bleeding from wound L arm    Patients top risk factors for readmission: medical condition-COPD, wound    Interventions to address risk factors: Scheduled appointment with PCP-23, Scheduled appointment with Specialist-23, Obtained and reviewed discharge summary and/or continuity of care documents, and Education of patient/family/caregiver/guardian to support self-management-wound, COPD    Care Transition Nurse (CTN) contacted the patient by telephone to perform post hospital discharge assessment. Verified name and  with patient as identifiers. Provided introduction to self, and explanation of the CTN role. CTN reviewed discharge instructions, medical action plan and red flags with patient who verbalized understanding. Were discharge instructions available to patient? yes. Reviewed appropriate site of care based on symptoms and resources available to patient including: PCP, Specialist, [address] [place] [address] Jarad Haiens, When to call 911, and Business e via [place] .  Patient given an opportunity to ask questions and does not have any further questions or concerns at this time. The patient agrees to contact the PCP office for questions related to their healthcare. Medication reconciliation was performed with  pt.spouse, Mrs. June Morales , who verbalizes understanding of administration of home medications. Advised obtaining a 90-day supply of all daily and as-needed medications. Referral to Pharm D needed: no   START taking these medications     Details   acetaminophen (TYLENOL) 325 mg tablet Take 2 Tablets by mouth every four (4) hours as needed for Fever or Pain for up to 30 days. Qty: 180 Tablet, Refills: 0  Start date: 3/26/2023, End date: 4/25/2023         Home Health/Outpatient orders at discharge: home health care and [address]: [facility]   Date of initial visit: Darrius Webb 3/27/23      Was patient discharged with a pulse oximeter? no    Discussed follow-up appointments. If no appointment was previously scheduled, appointment scheduling offered: yes. Is follow up appointment scheduled within 7 days of discharge? no. (earliest appt). [address] follow up appointment(s):   Future Appointments   Date Time Provider Ruben Carlos   4/5/2023  2:15 PM María Shaw MD [facility]       Plan for follow-up call in 5-7 days based on severity of symptoms and risk factors. Plan for next call: self management-COPD, wound and follow up appointment-pcp/specialist  CTN provided contact information for future needs. Goals Addressed                   This Visit's Progress     Prevent complications post hospitalization. 3/27   Pt.will attend  follow ups with pcp and specialist.  -PCP, Yanci Hughes MD 4/5/23 @ 2:15 pm  -[facility] Juliocesar Wheeler MD 4/7/23  -[facility] service as needed, f/u with pt.in 5-7days. -[facility]       Wound L arm:  (Teach Back) Pt.will wash hands before coming in contact with surgical site, notify PCP or surgeon s/s of infection,  incision that is red; continues to have drainage; drainage has a foul odor or persistent fever, HA, changes in sensation in your extremity. Pt.will notify surgeon or PCP office asap of sx. CTN contact information provided; f/u with pt.in 5-7 days. -      COPD:    Pt.will avoid irritants such as smoke, dust and other pollutants;  symptoms to report:   (Teach Back)  -increased SOB after mild exercise such as walking or with everyday activities  -wheezing  -change in sputum production and color,   -increased coughing, wheezing or chest tightness, pt will notify PCP; symptoms unrelieved with rest and medications; pt.will go to ED for eval.    CTN contact information provided to call with any questions or concerns. CTN will f/u with pt.in 5-7 days. -     SMOKER:  CTN discuss the dangers of continued tobacco use, Cessation was encouraged. Pt.decline information to  [employer] smoking cessation program. Pt.will develop a plan that he thinks will work in order quit. Pt.will reduce number of cigarettes smoking daily, reports he has decreased to 3/day. CTN will follow up with pt.in 5-7 days. -[address]

## 2023-03-30 ENCOUNTER — PATIENT OUTREACH (OUTPATIENT)
Dept: CASE MANAGEMENT | Age: 68
End: 2023-03-30

## 2023-03-30 NOTE — PROGRESS NOTES
Physician Progress Note      PATIENT:               Janey Newell  CSN #:                  [id number]  :                       1955  ADMIT DATE:       3/26/2023 12:45 AM  DISCH DATE:        3/26/2023 1:30 PM  RESPONDING  PROVIDER #:        Britany GARZON NP          QUERY TEXT:    ,  Pt admitted with arterial bleeding and underwent recent left arm tumor removal noted. ? If possible, please document in progress notes and discharge summary the relationship if any between the bleeding and the surgery: The medical record reflects the following:  Risk Factors: 80-year-old male with recent left arm tumor removal with wound vac  Clinical Indicators: CC: bleeding from left arm - tumor removal about a week ago and had a wound vac placed. Woke up today in a pool of blood and called EMS. ED provider note - Left arm with large wound with bone exposed after wound VAC removed. There is a single pulsatile arterial bleed. Wound VAC dressing was removed, tourniquet was applied on the left arm and left in place for short amount of time while the wound was cleaned. When the tourniquet was released there is noted pulsatile arterial blood, tried quick clot without any success. Tourniquet was reapplied and the arterial bleeding was cauterized and quick clot was applied. The tourniquet was released and bleeding ceased. Treatment: Cauterization and Quick Clot applied  Thank you,  Marlon Alexis RN, CRCR  [email or web address]  You can also reach me via [employer]. Options provided:  -- Bleeding ?is due to the procedure  -- Bleeding is not due to the procedure, but is due to daily Aspirin  -- Bleeding is not due to the procedure, but is due to other incidental risk factor, Please specify other incidental risk factor.   -- Other - I will add my own diagnosis  -- Disagree - Not applicable / Not valid  -- Disagree - Clinically unable to determine / Unknown  -- Refer to Clinical Documentation Reviewer    PROVIDER RESPONSE TEXT:    Bleeding is not due to the procedure but due to wound dehiscense    Query created by:  Jaleel Farrell on 3/30/2023 8:41 AM      Electronically signed by:  Donald Hardy NP 3/30/2023 9:49 AM

## 2023-03-30 NOTE — PROGRESS NOTES
Care Transitions Follow Up Call    Patient Current Location: [place]    Challenges to be reviewed by the provider   Additional needs identified to be addressed with provider: no  none           Method of communication with provider : none    Care Transition Nurse (CTN) contacted the patient by telephone to follow up after admission on 3/26/23. Verified name and  with patient as identifiers. Addressed changes since last contact: none  Follow up appointment completed? no  Was follow up appointment scheduled within 7 days of discharge? no.     [facility] follow up appointment(s):   Future Appointments   Date Time Provider Ruben Padillai   2023  2:15 PM Helene Rosas MD [facility]       CTN provided contact information for future needs. Plan for follow-up call in 5-7 days based on severity of symptoms and risk factors. Plan for next call: follow up appointment-pcp/specialist       Goals Addressed                   This Visit's Progress     Prevent complications post hospitalization. 3/27   Pt.will attend  follow ups with pcp and specialist.  -PCP, Daniella Tipton MD 23 @ 2:15 pm  -[facility] Ava Tijerina MD 23  -[facility] service as needed, f/u with pt.in 5-7days. -       Wound L arm:  (Teach Back) Pt.will wash hands before coming in contact with surgical site, notify PCP or surgeon s/s of infection,  incision that is red; continues to have drainage; drainage has a foul odor or persistent fever, HA, changes in sensation in your extremity. Pt.will notify surgeon or PCP office asap of sx. CTN contact information provided; f/u with pt.in 5-7 days. -      COPD:    Pt.will avoid irritants such as smoke, dust and other pollutants;  symptoms to report:   (Teach Back)  -increased SOB after mild exercise such as walking or with everyday activities  -wheezing  -change in sputum production and color,   -increased coughing, wheezing or chest tightness, pt will notify PCP; symptoms unrelieved with rest and medications; pt.will go to ED for eval.    CTN contact information provided to call with any questions or concerns. CTN will f/u with pt.in 5-7 days. -     SMOKER:  CTN discuss the dangers of continued tobacco use, Cessation was encouraged. Pt.decline information to  [employer] smoking cessation program. Pt.will develop a plan that he thinks will work in order quit. Pt.will reduce number of cigarettes smoking daily, reports he has decreased to 3/day. CTN will follow up with pt.in 5-7 days. -[facility]     3/30  Upcoming appts with pcp and specialist.  -PCP, Yuriy Sanchez MD 4/5/23 @ 2:15 pm  -[facility] Giovanni Krishnamurthy MD 4/7/23  -[facility] service as needed, f/u with pt.in 5-7days. -       Wound L arm:  (Teach Back) Pt.will wash hands before coming in contact with L arm wound, notify PCP or surgeon s/s of infection,  increased redness; increase drainage; drainage has a foul odor;  persistent fever, HA, changes in sensation in the extremity. Pt.will notify surgeon or PCP office asap of sx. CTN contact information provided; f/u with pt.in 5-7 days. -      COPD:  Pt.reports he is doing well, no reports s/s listed below. Pt.will avoid irritants such as smoke, dust and other pollutants;  symptoms to report:   (Teach Back)  -increased SOB after mild exercise such as walking or with everyday activities  -wheezing  -change in sputum production and color,   -increased coughing, wheezing or chest tightness, pt will notify PCP; symptoms unrelieved with rest and medications; pt.will go to ED for eval.    CTN contact information provided to call with any questions or concerns. CTN will f/u with pt.in 5-7 days. -     Smoke/Cessation  Pt.will continue reduce number of cigarettes smoking daily, until he is able to quit. CTN will follow up with pt.progress in 5-7 days. -[facility]

## 2023-04-21 ENCOUNTER — PATIENT OUTREACH (OUTPATIENT)
Dept: CASE MANAGEMENT | Age: 68
End: 2023-04-21

## 2023-04-21 NOTE — PROGRESS NOTES
Care Transitions Follow Up Call    Patient Current Location: [place]    Challenges to be reviewed by the provider   Additional needs identified to be addressed with provider: no  none           Method of communication with provider : none    Care Transition Nurse (CTN) contacted the patient by telephone to follow up after admission on 3/26/23. Verified name and  with patient as identifiers. Addressed changes since last contact: none    [facility] follow up appointment(s):   Future Appointments   Date Time Provider Ruben Calros   2023  3:45 PM Kapil Santiago MD [facility]       CTN provided contact information for future needs. Plan for follow-up call in 5-7 days based on severity of symptoms and risk factors. Plan for next call: self management-L arm wound(wound vac) and follow up appointment-pcp/specialist       Goals Addressed                   This Visit's Progress     Prevent complications post hospitalization. 3/27   Pt.will attend  follow ups with pcp and specialist.  -PCP, Deb Drake MD 23 @ 2:15 pm  -[facility] Ria Tavares MD 23  -[facility] service as needed, f/u with pt.in 5-7days. -       Wound L arm:  (Teach Back) Pt.will wash hands before coming in contact with surgical site, notify PCP or surgeon s/s of infection,  incision that is red; continues to have drainage; drainage has a foul odor or persistent fever, HA, changes in sensation in your extremity. Pt.will notify surgeon or PCP office asap of sx. CTN contact information provided; f/u with pt.in 5-7 days. -      COPD:    Pt.will avoid irritants such as smoke, dust and other pollutants;  symptoms to report:   (Teach Back)  -increased SOB after mild exercise such as walking or with everyday activities  -wheezing  -change in sputum production and color,   -increased coughing, wheezing or chest tightness, pt will notify PCP; symptoms unrelieved with rest and medications; pt.will go to ED for eval. CTN contact information provided to call with any questions or concerns. CTN will f/u with pt.in 5-7 days. -     SMOKER:  CTN discuss the dangers of continued tobacco use, Cessation was encouraged. Pt.decline information to  [facility] smoking cessation program. Pt.will develop a plan that he thinks will work in order quit. Pt.will reduce number of cigarettes smoking daily, reports he has decreased to 3/day. CTN will follow up with pt.in 5-7 days. -[address]     3/30  Upcoming appts with pcp and specialist.  -PCP, Mitul Candelario MD 4/5/23 @ 2:15 pm  -[facility] Estefany Zavaleta MD 4/7/23  -[facility] service as needed, f/u with pt.in 5-7days. -       Wound L arm:  (Teach Back) Pt.will wash hands before coming in contact with L arm wound, notify PCP or surgeon s/s of infection,  increased redness; increase drainage; drainage has a foul odor;  persistent fever, HA, changes in sensation in the extremity. Pt.will notify surgeon or PCP office asap of sx. CTN contact information provided; f/u with pt.in 5-7 days. -      COPD:  Pt.reports he is doing well, no reports s/s listed below. Pt.will avoid irritants such as smoke, dust and other pollutants;  symptoms to report:   (Teach Back)  -increased SOB after mild exercise such as walking or with everyday activities  -wheezing  -change in sputum production and color,   -increased coughing, wheezing or chest tightness, pt will notify PCP; symptoms unrelieved with rest and medications; pt.will go to ED for eval.    CTN contact information provided to call with any questions or concerns. CTN will f/u with pt.in 5-7 days. -     Smoke/Cessation  Pt.will continue reduce number of cigarettes smoking daily, until he is able to quit. CTN will follow up with pt.progress in 5-7 days. -[address]     4/11  Upcoming appts with pcp and specialist.  -PCP, Mitul Candelario MD 5/1/23   [facility], f/u with pt.in 5-7days. -[facility]       Wound L arm: Pt.report arm healing well, SN visit 3 times a week, no reports s/s infection or non-healing. (Teach Back) Pt.will wash hands before coming in contact with L arm wound, notify PCP or surgeon s/s of infection,  increased redness; increase drainage; drainage has a foul odor;  persistent fever, HA, changes in sensation in the extremity. Pt.will notify surgeon or PCP office asap of sx. CTN contact information provided; f/u with pt.in 5-7 days. -[facility]      COPD:  Pt.reports he is doing well, no reports s/s listed below. Pt.will avoid irritants such as smoke, dust and other pollutants;  symptoms to report:   (Teach Back)  -increased SOB after mild exercise such as walking or with everyday activities  -wheezing  -change in sputum production and color,   -increased coughing, wheezing or chest tightness, pt will notify PCP; symptoms unrelieved with rest and medications; pt.will go to ED for eval.      Smoke/Cessation  Pt.will continue reduce number of cigarettes smoking daily, until he is able to quit. CTN will follow up with pt.progress in 5-7 days. -[address]     4/21  Upcoming appts with pcp and specialist.  -PCP, Triston Cole MD 5/1/23   -continue with [facility] D[facility] as needed, f/u with pt.in 5-7days. -[facility]       Wound L arm: (wound vac); Pt.report arm healing well, SN visits 3 times a week, no reports s/s infection or non-healing. (Teach Back) Pt.will wash hands before coming in contact with L arm wound, notify PCP or surgeon s/s of infection,  increased redness; increase drainage; drainage has a foul odor;  persistent fever, HA, changes in sensation in the extremity. Pt.will notify surgeon or PCP office asap of sx. CTN contact information provided; f/u with pt.in 5-7 days. -[facility]      COPD: Pt.will avoid irritants such as smoke, dust and other pollutants;  symptoms to report:   (Teach Back)  -increased SOB after mild exercise such as walking or with everyday activities  -wheezing  -change in sputum production and color,   -increased coughing, wheezing or chest tightness, pt will notify PCP; symptoms unrelieved with rest and medications; pt.will go to ED for eval.      Smoke/Cessation  Pt.will continue reduce number of cigarettes smoking daily, until he is able to quit. CTN will follow up with pt.progress in 5-7 days. -[address]

## 2023-04-28 ENCOUNTER — PATIENT OUTREACH (OUTPATIENT)
Dept: CASE MANAGEMENT | Age: 68
End: 2023-04-28

## 2023-05-01 ENCOUNTER — OFFICE VISIT (OUTPATIENT)
Dept: FAMILY MEDICINE CLINIC | Age: 68
End: 2023-05-01
Payer: MEDICARE

## 2023-05-01 VITALS
RESPIRATION RATE: 20 BRPM | OXYGEN SATURATION: 95 % | SYSTOLIC BLOOD PRESSURE: 129 MMHG | HEART RATE: 76 BPM | TEMPERATURE: 98.6 F | BODY MASS INDEX: 38.47 KG/M2 | DIASTOLIC BLOOD PRESSURE: 82 MMHG | WEIGHT: 284 LBS | HEIGHT: 72 IN

## 2023-05-01 DIAGNOSIS — E66.01 SEVERE OBESITY (BMI 35.0-39.9) WITH COMORBIDITY (HCC): ICD-10-CM

## 2023-05-01 DIAGNOSIS — I10 ESSENTIAL HYPERTENSION: Primary | ICD-10-CM

## 2023-05-01 DIAGNOSIS — J43.9 PULMONARY EMPHYSEMA, UNSPECIFIED EMPHYSEMA TYPE (HCC): ICD-10-CM

## 2023-05-01 DIAGNOSIS — R22.32 MASS OF LEFT ELBOW: ICD-10-CM

## 2023-05-01 DIAGNOSIS — I73.9 PERIPHERAL VASCULAR DISEASE (HCC): ICD-10-CM

## 2023-05-01 PROBLEM — F11.99 OPIOID USE, UNSPECIFIED WITH UNSPECIFIED OPIOID-INDUCED DISORDER (HCC): Status: RESOLVED | Noted: 2022-11-15 | Resolved: 2023-05-01

## 2023-05-01 PROCEDURE — 99214 OFFICE O/P EST MOD 30 MIN: CPT | Performed by: FAMILY MEDICINE

## 2023-05-01 PROCEDURE — 3017F COLORECTAL CA SCREEN DOC REV: CPT | Performed by: FAMILY MEDICINE

## 2023-05-01 PROCEDURE — 1101F PT FALLS ASSESS-DOCD LE1/YR: CPT | Performed by: FAMILY MEDICINE

## 2023-05-01 PROCEDURE — 3079F DIAST BP 80-89 MM HG: CPT | Performed by: FAMILY MEDICINE

## 2023-05-01 PROCEDURE — G8510 SCR DEP NEG, NO PLAN REQD: HCPCS | Performed by: FAMILY MEDICINE

## 2023-05-01 PROCEDURE — G8536 NO DOC ELDER MAL SCRN: HCPCS | Performed by: FAMILY MEDICINE

## 2023-05-01 PROCEDURE — 1123F ACP DISCUSS/DSCN MKR DOCD: CPT | Performed by: FAMILY MEDICINE

## 2023-05-01 PROCEDURE — G8427 DOCREV CUR MEDS BY ELIG CLIN: HCPCS | Performed by: FAMILY MEDICINE

## 2023-05-01 PROCEDURE — 3074F SYST BP LT 130 MM HG: CPT | Performed by: FAMILY MEDICINE

## 2023-05-01 PROCEDURE — G8417 CALC BMI ABV UP PARAM F/U: HCPCS | Performed by: FAMILY MEDICINE

## 2023-05-01 RX ORDER — ALBUTEROL SULFATE 90 UG/1
2 AEROSOL, METERED RESPIRATORY (INHALATION)
COMMUNITY

## 2023-05-01 RX ORDER — TRAMADOL HYDROCHLORIDE 50 MG/1
50 TABLET ORAL DAILY
Qty: 30 TABLET | Refills: 1 | Status: SHIPPED | OUTPATIENT
Start: 2023-05-01 | End: 2023-05-04

## 2023-05-01 NOTE — PROGRESS NOTES
Patient here for hospital follow up3/26/2023for left arm wound dehissing. Patient has a wound vac on. Home health nurses come for wound care three days per week. [facility]. 1. Have you been to the ER, urgent care clinic since your last visit? Hospitalized since your last visit? No    2. Have you seen or consulted any other health care providers outside of the [address] [place] [address] since your last visit? Include any pap smears or colon screening. No             Chief Complaint   Patient presents with    Hospital Follow Up     He is a 79 y.o. male who presents for evalution. Reviewed PmHx, RxHx, FmHx, SocHx, AllgHx and updated and dated in the chart. Patient Active Problem List    Diagnosis    Bleeding    Wound dehiscence    Olecranon bursitis of left elbow    Abnormal liver enzymes    Severe obesity (HCC)    Peripheral vascular disease (HCC)    Pulmonary emphysema (HCC)    DDD (degenerative disc disease), cervical    Essential hypertension    HELGA (obstructive sleep apnea)       Review of Systems - negative except as listed above in the HPI    Objective:     Vitals:    05/01/23 1540   BP: 129/82   Pulse: 76   Resp: 20   Temp: 98.6 °F (37 °C)   SpO2: 95%   Weight: 284 lb (128.8 kg)   Height: 6' (1.829 m)         Assessment/ Plan:   Diagnoses and all orders for this visit:    1. Essential hypertension  Blood pressure goal  2. Severe obesity (BMI 35.0-39. 9) with comorbidity ([address]  DW diet  3. Pulmonary emphysema, unspecified emphysema type ([address]  Stable but continues to smoke  4. Peripheral vascular disease (HCC)  Stable to the smoke without excessive pain  5. Mass of left elbow  -     traMADoL (ULTRAM) 50 mg tablet; Take 1 Tablet by mouth daily for 3 days. Max Daily Amount: 50 mg. Patient has history of left arm tumors that resulted in significant surgeries. Has current wound VAC on area and has occasional pain to take at nighttime would like to have the medicine then.    was reviewed last pain medication early April. Asked patient to keep to 1 tablet daily at max that we will reevaluate in 4 to 6 weeks. Blood pressure has improved since visit in early March. I have discussed the diagnosis with the patient and the intended plan as seen in the above orders. The patient understands and agrees with the plan. The patient has received an after-visit summary and questions were answered concerning future plans. Medication Side Effects and Warnings were discussed with patient  Patient Labs were reviewed and or requested:  Patient Past Records were reviewed and or requested    Carina Alanis M.D. There are no Patient Instructions on file for this visit.

## 2023-05-01 NOTE — PROGRESS NOTES
Patient here for hospital follow up3/26/2023for left arm wound dehissing. Patient has a wound vac on. Home health nurses come for wound care three days per week. [facility]. 1. Have you been to the ER, urgent care clinic since your last visit? Hospitalized since your last visit? No    2. Have you seen or consulted any other health care providers outside of the [address] [place] [address] since your last visit? Include any pap smears or colon screening.  No

## 2023-05-25 RX ORDER — AMLODIPINE BESYLATE 10 MG/1
10 TABLET ORAL DAILY
COMMUNITY
Start: 2023-04-11 | End: 2023-07-10 | Stop reason: SDUPTHER

## 2023-05-25 RX ORDER — LOSARTAN POTASSIUM 100 MG/1
1 TABLET ORAL DAILY
COMMUNITY
Start: 2023-03-20

## 2023-05-25 RX ORDER — ALBUTEROL SULFATE 90 UG/1
2 AEROSOL, METERED RESPIRATORY (INHALATION) EVERY 4 HOURS PRN
COMMUNITY

## 2023-05-25 RX ORDER — FLUTICASONE FUROATE AND VILANTEROL 100; 25 UG/1; UG/1
1 POWDER RESPIRATORY (INHALATION) DAILY
COMMUNITY
Start: 2022-11-15

## 2023-05-25 RX ORDER — ASPIRIN 81 MG/1
1 TABLET ORAL DAILY
COMMUNITY
Start: 2020-03-09

## 2023-05-25 RX ORDER — GABAPENTIN 800 MG/1
800 TABLET ORAL 3 TIMES DAILY
COMMUNITY
Start: 2022-11-15

## 2023-06-24 ENCOUNTER — APPOINTMENT (OUTPATIENT)
Facility: HOSPITAL | Age: 68
End: 2023-06-24
Payer: MEDICARE

## 2023-06-24 ENCOUNTER — HOSPITAL ENCOUNTER (EMERGENCY)
Facility: HOSPITAL | Age: 68
Discharge: HOME | End: 2023-06-24
Attending: EMERGENCY MEDICINE
Payer: MEDICARE

## 2023-06-24 VITALS
HEART RATE: 71 BPM | SYSTOLIC BLOOD PRESSURE: 143 MMHG | DIASTOLIC BLOOD PRESSURE: 98 MMHG | WEIGHT: 268.2 LBS | OXYGEN SATURATION: 98 % | TEMPERATURE: 98.5 F | HEIGHT: 72 IN | BODY MASS INDEX: 36.33 KG/M2 | RESPIRATION RATE: 20 BRPM

## 2023-06-24 DIAGNOSIS — R07.2 SUBSTERNAL CHEST PAIN: Primary | ICD-10-CM

## 2023-06-24 LAB
ALBUMIN SERPL-MCNC: 3.6 G/DL (ref 3.5–5)
ALBUMIN/GLOB SERPL: 0.8 (ref 1.1–2.2)
ALP SERPL-CCNC: 75 U/L (ref 45–117)
ALT SERPL-CCNC: 42 U/L (ref 12–78)
ANION GAP SERPL CALC-SCNC: 6 MMOL/L (ref 5–15)
AST SERPL W P-5'-P-CCNC: 22 U/L (ref 15–37)
BASOPHILS # BLD: 0.1 K/UL (ref 0–0.1)
BASOPHILS NFR BLD: 1 % (ref 0–1)
BILIRUB SERPL-MCNC: 0.3 MG/DL (ref 0.2–1)
BUN SERPL-MCNC: 13 MG/DL (ref 6–20)
BUN/CREAT SERPL: 11 (ref 12–20)
CA-I BLD-MCNC: 8.9 MG/DL (ref 8.5–10.1)
CHLORIDE SERPL-SCNC: 107 MMOL/L (ref 97–108)
CO2 SERPL-SCNC: 25 MMOL/L (ref 21–32)
CREAT SERPL-MCNC: 1.19 MG/DL (ref 0.7–1.3)
DIFFERENTIAL METHOD BLD: ABNORMAL
EOSINOPHIL # BLD: 0.1 K/UL (ref 0–0.4)
EOSINOPHIL NFR BLD: 1 % (ref 0–7)
ERYTHROCYTE [DISTWIDTH] IN BLOOD BY AUTOMATED COUNT: 16.7 % (ref 11.5–14.5)
GLOBULIN SER CALC-MCNC: 4.3 G/DL (ref 2–4)
GLUCOSE SERPL-MCNC: 113 MG/DL (ref 65–100)
HCT VFR BLD AUTO: 40.3 % (ref 36.6–50.3)
HGB BLD-MCNC: 12.6 G/DL (ref 12.1–17)
IMM GRANULOCYTES # BLD AUTO: 0 K/UL (ref 0–0.04)
IMM GRANULOCYTES NFR BLD AUTO: 0 % (ref 0–0.5)
LYMPHOCYTES # BLD: 2.5 K/UL (ref 0.8–3.5)
LYMPHOCYTES NFR BLD: 39 % (ref 12–49)
MCH RBC QN AUTO: 25.4 PG (ref 26–34)
MCHC RBC AUTO-ENTMCNC: 31.3 G/DL (ref 30–36.5)
MCV RBC AUTO: 81.3 FL (ref 80–99)
MONOCYTES # BLD: 0.7 K/UL (ref 0–1)
MONOCYTES NFR BLD: 11 % (ref 5–13)
NEUTS SEG # BLD: 2.9 K/UL (ref 1.8–8)
NEUTS SEG NFR BLD: 48 % (ref 32–75)
NRBC # BLD: 0 K/UL (ref 0–0.01)
NRBC BLD-RTO: 0 PER 100 WBC
PLATELET # BLD AUTO: 299 K/UL (ref 150–400)
PMV BLD AUTO: 9 FL (ref 8.9–12.9)
POTASSIUM SERPL-SCNC: 3.9 MMOL/L (ref 3.5–5.1)
PROT SERPL-MCNC: 7.9 G/DL (ref 6.4–8.2)
RBC # BLD AUTO: 4.96 M/UL (ref 4.1–5.7)
RBC MORPH BLD: ABNORMAL
SODIUM SERPL-SCNC: 138 MMOL/L (ref 136–145)
TROPONIN I SERPL HS-MCNC: 6 NG/L (ref 0–76)
WBC # BLD AUTO: 6.3 K/UL (ref 4.1–11.1)

## 2023-06-24 PROCEDURE — 84484 ASSAY OF TROPONIN QUANT: CPT

## 2023-06-24 PROCEDURE — 36415 COLL VENOUS BLD VENIPUNCTURE: CPT

## 2023-06-24 PROCEDURE — 85025 COMPLETE CBC W/AUTO DIFF WBC: CPT

## 2023-06-24 PROCEDURE — 99285 EMERGENCY DEPT VISIT HI MDM: CPT

## 2023-06-24 PROCEDURE — 80053 COMPREHEN METABOLIC PANEL: CPT

## 2023-06-24 PROCEDURE — 6370000000 HC RX 637 (ALT 250 FOR IP): Performed by: EMERGENCY MEDICINE

## 2023-06-24 PROCEDURE — 71045 X-RAY EXAM CHEST 1 VIEW: CPT

## 2023-06-24 PROCEDURE — 93005 ELECTROCARDIOGRAM TRACING: CPT | Performed by: EMERGENCY MEDICINE

## 2023-06-24 RX ORDER — LIDOCAINE HYDROCHLORIDE 20 MG/ML
15 SOLUTION OROPHARYNGEAL
Status: COMPLETED | OUTPATIENT
Start: 2023-06-24 | End: 2023-06-24

## 2023-06-24 RX ORDER — MAGNESIUM HYDROXIDE/ALUMINUM HYDROXICE/SIMETHICONE 120; 1200; 1200 MG/30ML; MG/30ML; MG/30ML
30 SUSPENSION ORAL
Status: COMPLETED | OUTPATIENT
Start: 2023-06-24 | End: 2023-06-24

## 2023-06-24 RX ORDER — LIDOCAINE HYDROCHLORIDE 20 MG/ML
15 SOLUTION OROPHARYNGEAL EVERY 6 HOURS PRN
Qty: 250 ML | Refills: 1 | Status: SHIPPED | OUTPATIENT
Start: 2023-06-24

## 2023-06-24 RX ORDER — ASPIRIN 325 MG
325 TABLET ORAL
Status: COMPLETED | OUTPATIENT
Start: 2023-06-24 | End: 2023-06-24

## 2023-06-24 RX ORDER — OMEPRAZOLE 20 MG/1
20 TABLET, DELAYED RELEASE ORAL DAILY
Qty: 28 TABLET | Refills: 0 | Status: SHIPPED | OUTPATIENT
Start: 2023-06-24 | End: 2023-07-22

## 2023-06-24 RX ADMIN — ALUMINUM HYDROXIDE, MAGNESIUM HYDROXIDE, AND SIMETHICONE 30 ML: 200; 200; 20 SUSPENSION ORAL at 18:34

## 2023-06-24 RX ADMIN — ASPIRIN 325 MG: 325 TABLET ORAL at 18:34

## 2023-06-24 RX ADMIN — Medication 15 ML: at 18:34

## 2023-06-24 ASSESSMENT — LIFESTYLE VARIABLES
HOW OFTEN DO YOU HAVE A DRINK CONTAINING ALCOHOL: 2-4 TIMES A MONTH
HOW MANY STANDARD DRINKS CONTAINING ALCOHOL DO YOU HAVE ON A TYPICAL DAY: 1 OR 2

## 2023-06-24 ASSESSMENT — PAIN - FUNCTIONAL ASSESSMENT
PAIN_FUNCTIONAL_ASSESSMENT: 0-10
PAIN_FUNCTIONAL_ASSESSMENT: 0-10

## 2023-06-24 ASSESSMENT — PAIN SCALES - GENERAL
PAINLEVEL_OUTOF10: 5
PAINLEVEL_OUTOF10: 2
PAINLEVEL_OUTOF10: 2

## 2023-06-24 ASSESSMENT — HEART SCORE: ECG: 0

## 2023-06-24 ASSESSMENT — PAIN DESCRIPTION - LOCATION
LOCATION: CHEST;ABDOMEN
LOCATION: CHEST

## 2023-06-24 ASSESSMENT — PAIN DESCRIPTION - DESCRIPTORS: DESCRIPTORS: SORE

## 2023-06-24 NOTE — ED PROVIDER NOTES
hydroxide-simethicone (MAALOX) [id number] MG/5ML suspension 30 mL (30 mLs Oral Given 6/24/23 0146)       CONSULTS: (Who and What was discussed)  None     Social Determinants affecting Dx or Tx: None    Smoking Cessation: Not Applicable    PROCEDURES   Unless otherwise noted above, none  Procedures      CRITICAL CARE TIME   Patient does not meet Critical Care Time, 0 minutes    ED FINAL IMPRESSION     1. Substernal chest pain          DISPOSITION/PLAN   DISPOSITION Decision To Discharge 06/24/2023 07:36:41 PM    Discharge Note: The patient is stable for discharge home. The signs, symptoms, diagnosis, and discharge instructions have been discussed, understanding conveyed, and agreed upon. The patient is to follow up as recommended or return to ER should their symptoms worsen.       PATIENT REFERRED TO:  Gail Mares MD  [address] [facility]  [phone]    In 2 days      Mk Guerin MD  [address] [id number] [phone]      As needed        DISCHARGE MEDICATIONS:     Medication List        START taking these medications      lidocaine viscous hcl 2 % Soln solution  Commonly known as: XYLOCAINE  Take 15 mLs by mouth every 6 hours as needed (Reflux/abdominal pain) Mix each dose at home with 30 mL over-the-counter Maalox or Mylanta     omeprazole 20 MG tablet  Commonly known as: PRILOSEC OTC  Take 1 tablet by mouth daily for 28 days            ASK your doctor about these medications      albuterol sulfate  (90 Base) MCG/ACT inhaler  Commonly known as: PROVENTIL;VENTOLIN;PROAIR     amLODIPine 10 MG tablet  Commonly known as: NORVASC     aspirin 81 MG EC tablet     Breo Ellipta 100-25 MCG/ACT inhaler  Generic drug: fluticasone furoate-vilanterol     gabapentin 800 MG tablet  Commonly known as: NEURONTIN     losartan 100 MG tablet  Commonly known as: COZAAR     tiotropium 18 MCG inhalation capsule  Commonly known as: Cindy Russ

## 2023-06-24 NOTE — ED TRIAGE NOTES
Pt. Arrives with complaints of chest pain and tightness x3 weeks with SOB. Pt. Also complains of breaking out into sweats while sitting. Pt. Has woundvac in place on left elbow from operation on March 16, 2023 to remove 4 cancerous tumors. Pt. Also believes he has been losing weight.

## 2023-06-24 NOTE — DISCHARGE INSTRUCTIONS
Thank you! Thank you for allowing me to care for you in the emergency department. It is my goal to provide you with excellent care. If you have not received excellent quality care, please ask to speak to the nurse manager. Please fill out the survey that will come to you by mail or email since we listen to your feedback! Below you will find a list of your tests from today's visit. Should you have any questions, please do not hesitate to call the emergency department.     Labs  Recent Results (from the past 12 hour(s))   CBC with Auto Differential    Collection Time: 06/24/23  6:13 PM   Result Value Ref Range    WBC 6.3 4.1 - 11.1 K/uL    RBC 4.96 4.10 - 5.70 M/uL    Hemoglobin 12.6 12.1 - 17.0 g/dL    Hematocrit 40.3 36.6 - 50.3 %    MCV 81.3 80.0 - 99.0 FL    MCH 25.4 (L) 26.0 - 34.0 PG    MCHC 31.3 30.0 - 36.5 g/dL    RDW 16.7 (H) 11.5 - 14.5 %    Platelets 392 741 - 285 K/uL    MPV 9.0 8.9 - 12.9 FL    Nucleated RBCs 0.0 0.0  WBC    nRBC 0.00 0.00 - 0.01 K/uL    Neutrophils % 48 32 - 75 %    Lymphocytes % 39 12 - 49 %    Monocytes % 11 5 - 13 %    Eosinophils % 1 0 - 7 %    Basophils % 1 0 - 1 %    Immature Granulocytes 0 0 - 0.5 %    Neutrophils Absolute 2.9 1.8 - 8.0 K/UL    Lymphocytes Absolute 2.5 0.8 - 3.5 K/UL    Monocytes Absolute 0.7 0.0 - 1.0 K/UL    Eosinophils Absolute 0.1 0.0 - 0.4 K/UL    Basophils Absolute 0.1 0.0 - 0.1 K/UL    Absolute Immature Granulocyte 0.0 0.00 - 0.04 K/UL    Differential Type AUTOMATED      RBC Comment Anisocytosis  2+        RBC Comment Microcytosis  2+        RBC Comment Hypochromia  1+       Comprehensive Metabolic Panel    Collection Time: 06/24/23  6:13 PM   Result Value Ref Range    Sodium 138 136 - 145 mmol/L    Potassium 3.9 3.5 - 5.1 mmol/L    Chloride 107 97 - 108 mmol/L    CO2 25 21 - 32 mmol/L    Anion Gap 6 5 - 15 mmol/L    Glucose 113 (H) 65 - 100 mg/dL    BUN 13 6 - 20 mg/dL    Creatinine 1.19 0.70 - 1.30 mg/dL    Bun/Cre Ratio 11 (L) 12 - 20

## 2023-06-25 LAB
EKG ATRIAL RATE: 80 BPM
EKG DIAGNOSIS: NORMAL
EKG P AXIS: 51 DEGREES
EKG P-R INTERVAL: 160 MS
EKG Q-T INTERVAL: 386 MS
EKG QRS DURATION: 92 MS
EKG QTC CALCULATION (BAZETT): 445 MS
EKG R AXIS: -21 DEGREES
EKG T AXIS: 37 DEGREES
EKG VENTRICULAR RATE: 80 BPM

## 2023-07-10 ENCOUNTER — TELEPHONE (OUTPATIENT)
Age: 68
End: 2023-07-10

## 2023-07-10 RX ORDER — AMLODIPINE BESYLATE 10 MG/1
10 TABLET ORAL DAILY
Qty: 90 TABLET | Refills: 3 | Status: SHIPPED | OUTPATIENT
Start: 2023-07-10

## 2023-07-10 NOTE — TELEPHONE ENCOUNTER
Patient's wife/Deborah would like to discuss patient's wound. He is getting a culture done as it looks infected and would like to speak with nurse about it.  Deborah's phone: [phone]

## 2023-07-10 NOTE — TELEPHONE ENCOUNTER
[facility] call [facility]. VO that Dr. Carisa Gill will follow patient.

## 2023-07-10 NOTE — TELEPHONE ENCOUNTER
We received a fax refill request for Sami Clark. Please escribe Amlodipine Besylate to their pharmacy. The pharmacy is correct in the chart and they are requesting a 90 day supply.

## 2023-07-10 NOTE — TELEPHONE ENCOUNTER
Returned call to wife Rebecca Lynne. [facility] out today to remove wound vac from infected elbow.  Appt scheduled for Friday with Dr. Katherine Medrano

## 2023-07-14 ENCOUNTER — OFFICE VISIT (OUTPATIENT)
Age: 68
End: 2023-07-14
Payer: MEDICARE

## 2023-07-14 VITALS
BODY MASS INDEX: 36.95 KG/M2 | HEART RATE: 76 BPM | DIASTOLIC BLOOD PRESSURE: 75 MMHG | OXYGEN SATURATION: 98 % | SYSTOLIC BLOOD PRESSURE: 112 MMHG | TEMPERATURE: 98.2 F | WEIGHT: 272.8 LBS | HEIGHT: 72 IN

## 2023-07-14 DIAGNOSIS — M25.561 RECURRENT PAIN OF RIGHT KNEE: ICD-10-CM

## 2023-07-14 DIAGNOSIS — C44.629: ICD-10-CM

## 2023-07-14 DIAGNOSIS — M70.22 OLECRANON BURSITIS OF LEFT ELBOW: Primary | ICD-10-CM

## 2023-07-14 DIAGNOSIS — R73.9 HYPERGLYCEMIA, UNSPECIFIED: ICD-10-CM

## 2023-07-14 DIAGNOSIS — I10 ESSENTIAL HYPERTENSION: ICD-10-CM

## 2023-07-14 LAB
EST. AVERAGE GLUCOSE BLD GHB EST-MCNC: 117 MG/DL
HBA1C MFR BLD: 5.7 % (ref 4–5.6)
URATE SERPL-MCNC: 5.6 MG/DL (ref 3.5–7.2)

## 2023-07-14 PROCEDURE — 99214 OFFICE O/P EST MOD 30 MIN: CPT | Performed by: FAMILY MEDICINE

## 2023-07-14 PROCEDURE — 1123F ACP DISCUSS/DSCN MKR DOCD: CPT | Performed by: FAMILY MEDICINE

## 2023-07-14 PROCEDURE — 3074F SYST BP LT 130 MM HG: CPT | Performed by: FAMILY MEDICINE

## 2023-07-14 PROCEDURE — 3078F DIAST BP <80 MM HG: CPT | Performed by: FAMILY MEDICINE

## 2023-07-14 RX ORDER — CEPHALEXIN 500 MG/1
CAPSULE ORAL
COMMUNITY
Start: 2023-07-11

## 2023-07-14 SDOH — ECONOMIC STABILITY: INCOME INSECURITY: HOW HARD IS IT FOR YOU TO PAY FOR THE VERY BASICS LIKE FOOD, HOUSING, MEDICAL CARE, AND HEATING?: NOT HARD AT ALL

## 2023-07-14 SDOH — ECONOMIC STABILITY: FOOD INSECURITY: WITHIN THE PAST 12 MONTHS, THE FOOD YOU BOUGHT JUST DIDN'T LAST AND YOU DIDN'T HAVE MONEY TO GET MORE.: NEVER TRUE

## 2023-07-14 SDOH — ECONOMIC STABILITY: FOOD INSECURITY: WITHIN THE PAST 12 MONTHS, YOU WORRIED THAT YOUR FOOD WOULD RUN OUT BEFORE YOU GOT MONEY TO BUY MORE.: NEVER TRUE

## 2023-07-14 SDOH — ECONOMIC STABILITY: HOUSING INSECURITY
IN THE LAST 12 MONTHS, WAS THERE A TIME WHEN YOU DID NOT HAVE A STEADY PLACE TO SLEEP OR SLEPT IN A SHELTER (INCLUDING NOW)?: NO

## 2023-07-14 ASSESSMENT — PATIENT HEALTH QUESTIONNAIRE - PHQ9
1. LITTLE INTEREST OR PLEASURE IN DOING THINGS: 0
SUM OF ALL RESPONSES TO PHQ9 QUESTIONS 1 & 2: 0
SUM OF ALL RESPONSES TO PHQ QUESTIONS 1-9: 0
2. FEELING DOWN, DEPRESSED OR HOPELESS: 0

## 2023-07-17 ENCOUNTER — CLINICAL DOCUMENTATION (OUTPATIENT)
Age: 68
End: 2023-07-17

## 2023-07-17 NOTE — PROGRESS NOTES
[address] [facility] [address] order was put on [facility] desk to process

## 2023-07-19 ENCOUNTER — CLINICAL DOCUMENTATION (OUTPATIENT)
Age: 68
End: 2023-07-19

## 2023-07-19 NOTE — PROGRESS NOTES
[address] [facility] order was signed & faxed to [phone] placed in scan folder to be scanned to chart.

## 2023-07-27 ENCOUNTER — TELEPHONE (OUTPATIENT)
Age: 68
End: 2023-07-27

## 2023-07-27 DIAGNOSIS — I73.9 PERIPHERAL VASCULAR DISEASE (HCC): Primary | ICD-10-CM

## 2023-07-27 NOTE — TELEPHONE ENCOUNTER
We received a fax refill request for Valerie Laboy. Please escribe Gabapentin to their pharmacy. The pharmacy is correct in the chart and they are requesting a 270 day supply.

## 2023-07-31 RX ORDER — GABAPENTIN 800 MG/1
800 TABLET ORAL 3 TIMES DAILY
Qty: 90 TABLET | Refills: 5 | Status: SHIPPED | OUTPATIENT
Start: 2023-07-31 | End: 2024-07-30

## 2023-08-01 ENCOUNTER — TELEPHONE (OUTPATIENT)
Age: 68
End: 2023-08-01

## 2023-08-16 DIAGNOSIS — I73.9 PERIPHERAL VASCULAR DISEASE (HCC): ICD-10-CM

## 2023-08-16 RX ORDER — GABAPENTIN 800 MG/1
800 TABLET ORAL 3 TIMES DAILY
Qty: 270 TABLET | Refills: 1 | Status: SHIPPED | OUTPATIENT
Start: 2023-08-16 | End: 2024-08-15

## 2023-09-18 ENCOUNTER — TELEPHONE (OUTPATIENT)
Age: 68
End: 2023-09-18

## 2023-09-18 NOTE — TELEPHONE ENCOUNTER
We received a fax refill request for Pee Monzon. Please escribe Tiotropium to their pharmacy. The pharmacy is correct in the chart and they are requesting a 90 day supply.

## 2023-09-19 RX ORDER — TIOTROPIUM BROMIDE 18 UG/1
CAPSULE ORAL; RESPIRATORY (INHALATION)
Qty: 30 CAPSULE | Refills: 5 | Status: SHIPPED | OUTPATIENT
Start: 2023-09-19

## 2023-12-11 ENCOUNTER — TELEPHONE (OUTPATIENT)
Age: 68
End: 2023-12-11

## 2023-12-11 NOTE — TELEPHONE ENCOUNTER
We received a fax refill request for Mj Alcantara. Please escribe Losartan to their pharmacy. The pharmacy is correct in the chart and they are requesting a 90 day supply.

## 2023-12-12 RX ORDER — LOSARTAN POTASSIUM 100 MG/1
100 TABLET ORAL DAILY
Qty: 90 TABLET | Refills: 5 | Status: SHIPPED | OUTPATIENT
Start: 2023-12-12

## 2024-02-01 ENCOUNTER — TELEPHONE (OUTPATIENT)
Age: 69
End: 2024-02-01

## 2024-02-01 RX ORDER — FLUTICASONE FUROATE AND VILANTEROL 100; 25 UG/1; UG/1
1 POWDER RESPIRATORY (INHALATION) DAILY
Qty: 1 EACH | Refills: 3 | Status: SHIPPED | OUTPATIENT
Start: 2024-02-01

## 2024-02-01 NOTE — TELEPHONE ENCOUNTER
We received a fax refill request for Harry Wang.  Please escribe Freda Stokes to their pharmacy.  The pharmacy is correct in the chart and they are requesting a 180 day supply.

## 2024-03-28 RX ORDER — TIOTROPIUM BROMIDE 18 UG/1
CAPSULE ORAL; RESPIRATORY (INHALATION)
Qty: 30 CAPSULE | Refills: 5 | Status: SHIPPED | OUTPATIENT
Start: 2024-03-28

## 2024-04-23 ENCOUNTER — HOSPITAL ENCOUNTER (INPATIENT)
Facility: HOSPITAL | Age: 69
LOS: 1 days | Discharge: HOME | End: 2024-04-25
Attending: STUDENT IN AN ORGANIZED HEALTH CARE EDUCATION/TRAINING PROGRAM | Admitting: INTERNAL MEDICINE
Payer: MEDICARE

## 2024-04-23 DIAGNOSIS — I89.1 LYMPHANGITIS: ICD-10-CM

## 2024-04-23 DIAGNOSIS — L03.114 CELLULITIS OF LEFT UPPER EXTREMITY: Primary | ICD-10-CM

## 2024-04-23 PROCEDURE — 96365 THER/PROPH/DIAG IV INF INIT: CPT

## 2024-04-23 PROCEDURE — 96375 TX/PRO/DX INJ NEW DRUG ADDON: CPT

## 2024-04-23 PROCEDURE — 99285 EMERGENCY DEPT VISIT HI MDM: CPT

## 2024-04-23 ASSESSMENT — PAIN SCALES - GENERAL: PAINLEVEL_OUTOF10: 9

## 2024-04-23 ASSESSMENT — PAIN - FUNCTIONAL ASSESSMENT: PAIN_FUNCTIONAL_ASSESSMENT: 0-10

## 2024-04-24 ENCOUNTER — APPOINTMENT (OUTPATIENT)
Facility: HOSPITAL | Age: 69
End: 2024-04-24
Attending: SURGERY
Payer: MEDICARE

## 2024-04-24 ENCOUNTER — APPOINTMENT (OUTPATIENT)
Facility: HOSPITAL | Age: 69
End: 2024-04-24
Payer: MEDICARE

## 2024-04-24 PROBLEM — L03.90 CELLULITIS: Status: ACTIVE | Noted: 2024-04-24

## 2024-04-24 LAB
ANION GAP SERPL CALC-SCNC: 6 MMOL/L (ref 5–15)
BASOPHILS # BLD: 0.1 K/UL (ref 0–0.1)
BASOPHILS NFR BLD: 1 % (ref 0–1)
BUN SERPL-MCNC: 11 MG/DL (ref 6–20)
BUN/CREAT SERPL: 9 (ref 12–20)
CA-I BLD-MCNC: 8.6 MG/DL (ref 8.5–10.1)
CHLORIDE SERPL-SCNC: 104 MMOL/L (ref 97–108)
CK SERPL-CCNC: 244 U/L (ref 39–308)
CO2 SERPL-SCNC: 23 MMOL/L (ref 21–32)
CREAT SERPL-MCNC: 1.25 MG/DL (ref 0.7–1.3)
CRP SERPL-MCNC: 7.42 MG/DL (ref 0–0.3)
DIFFERENTIAL METHOD BLD: ABNORMAL
EOSINOPHIL # BLD: 0.1 K/UL (ref 0–0.4)
EOSINOPHIL NFR BLD: 1 % (ref 0–7)
ERYTHROCYTE [DISTWIDTH] IN BLOOD BY AUTOMATED COUNT: 20 % (ref 11.5–14.5)
ERYTHROCYTE [SEDIMENTATION RATE] IN BLOOD: 33 MM/HR (ref 0–20)
EST. AVERAGE GLUCOSE BLD GHB EST-MCNC: 120 MG/DL
GLUCOSE SERPL-MCNC: 116 MG/DL (ref 65–100)
HBA1C MFR BLD: 5.8 % (ref 4–5.6)
HCT VFR BLD AUTO: 39.8 % (ref 36.6–50.3)
HGB BLD-MCNC: 12.6 G/DL (ref 12.1–17)
IMM GRANULOCYTES # BLD AUTO: 0 K/UL (ref 0–0.04)
IMM GRANULOCYTES NFR BLD AUTO: 0 % (ref 0–0.5)
LACTATE BLD-SCNC: 1.41 MMOL/L (ref 0.4–2)
LYMPHOCYTES # BLD: 2 K/UL (ref 0.8–3.5)
LYMPHOCYTES NFR BLD: 20 % (ref 12–49)
MCH RBC QN AUTO: 25 PG (ref 26–34)
MCHC RBC AUTO-ENTMCNC: 31.7 G/DL (ref 30–36.5)
MCV RBC AUTO: 79 FL (ref 80–99)
MONOCYTES # BLD: 1.5 K/UL (ref 0–1)
MONOCYTES NFR BLD: 15 % (ref 5–13)
NEUTS SEG # BLD: 6.5 K/UL (ref 1.8–8)
NEUTS SEG NFR BLD: 63 % (ref 32–75)
NRBC # BLD: 0 K/UL (ref 0–0.01)
NRBC BLD-RTO: 0 PER 100 WBC
PERFORMED BY:: NORMAL
PLATELET # BLD AUTO: 257 K/UL (ref 150–400)
PMV BLD AUTO: 9.4 FL (ref 8.9–12.9)
POTASSIUM SERPL-SCNC: 3.6 MMOL/L (ref 3.5–5.1)
PROCALCITONIN SERPL-MCNC: 0.17 NG/ML
RBC # BLD AUTO: 5.04 M/UL (ref 4.1–5.7)
SODIUM SERPL-SCNC: 133 MMOL/L (ref 136–145)
WBC # BLD AUTO: 10.2 K/UL (ref 4.1–11.1)

## 2024-04-24 PROCEDURE — 6360000002 HC RX W HCPCS: Performed by: INTERNAL MEDICINE

## 2024-04-24 PROCEDURE — 87040 BLOOD CULTURE FOR BACTERIA: CPT

## 2024-04-24 PROCEDURE — 87205 SMEAR GRAM STAIN: CPT

## 2024-04-24 PROCEDURE — 6360000002 HC RX W HCPCS

## 2024-04-24 PROCEDURE — 94640 AIRWAY INHALATION TREATMENT: CPT

## 2024-04-24 PROCEDURE — 85025 COMPLETE CBC W/AUTO DIFF WBC: CPT

## 2024-04-24 PROCEDURE — 6360000002 HC RX W HCPCS: Performed by: STUDENT IN AN ORGANIZED HEALTH CARE EDUCATION/TRAINING PROGRAM

## 2024-04-24 PROCEDURE — 6370000000 HC RX 637 (ALT 250 FOR IP): Performed by: INTERNAL MEDICINE

## 2024-04-24 PROCEDURE — 2580000003 HC RX 258

## 2024-04-24 PROCEDURE — 86140 C-REACTIVE PROTEIN: CPT

## 2024-04-24 PROCEDURE — 6370000000 HC RX 637 (ALT 250 FOR IP)

## 2024-04-24 PROCEDURE — 2580000003 HC RX 258: Performed by: STUDENT IN AN ORGANIZED HEALTH CARE EDUCATION/TRAINING PROGRAM

## 2024-04-24 PROCEDURE — 85652 RBC SED RATE AUTOMATED: CPT

## 2024-04-24 PROCEDURE — 84145 PROCALCITONIN (PCT): CPT

## 2024-04-24 PROCEDURE — 87147 CULTURE TYPE IMMUNOLOGIC: CPT

## 2024-04-24 PROCEDURE — 83605 ASSAY OF LACTIC ACID: CPT

## 2024-04-24 PROCEDURE — 2580000003 HC RX 258: Performed by: INTERNAL MEDICINE

## 2024-04-24 PROCEDURE — 73080 X-RAY EXAM OF ELBOW: CPT

## 2024-04-24 PROCEDURE — 83036 HEMOGLOBIN GLYCOSYLATED A1C: CPT

## 2024-04-24 PROCEDURE — 99222 1ST HOSP IP/OBS MODERATE 55: CPT | Performed by: SURGERY

## 2024-04-24 PROCEDURE — 87070 CULTURE OTHR SPECIMN AEROBIC: CPT

## 2024-04-24 PROCEDURE — 1100000000 HC RM PRIVATE

## 2024-04-24 PROCEDURE — 94761 N-INVAS EAR/PLS OXIMETRY MLT: CPT

## 2024-04-24 PROCEDURE — 80048 BASIC METABOLIC PNL TOTAL CA: CPT

## 2024-04-24 PROCEDURE — 82550 ASSAY OF CK (CPK): CPT

## 2024-04-24 RX ORDER — 0.9 % SODIUM CHLORIDE 0.9 %
500 INTRAVENOUS SOLUTION INTRAVENOUS ONCE
Status: COMPLETED | OUTPATIENT
Start: 2024-04-24 | End: 2024-04-24

## 2024-04-24 RX ORDER — SODIUM CHLORIDE 0.9 % (FLUSH) 0.9 %
5-40 SYRINGE (ML) INJECTION EVERY 12 HOURS SCHEDULED
Status: DISCONTINUED | OUTPATIENT
Start: 2024-04-24 | End: 2024-04-25 | Stop reason: HOSPADM

## 2024-04-24 RX ORDER — CLINDAMYCIN PHOSPHATE 600 MG/50ML
600 INJECTION, SOLUTION INTRAVENOUS EVERY 6 HOURS
Status: DISCONTINUED | OUTPATIENT
Start: 2024-04-24 | End: 2024-04-25 | Stop reason: HOSPADM

## 2024-04-24 RX ORDER — ONDANSETRON 2 MG/ML
4 INJECTION INTRAMUSCULAR; INTRAVENOUS EVERY 6 HOURS PRN
Status: DISCONTINUED | OUTPATIENT
Start: 2024-04-24 | End: 2024-04-25 | Stop reason: HOSPADM

## 2024-04-24 RX ORDER — SODIUM CHLORIDE 0.9 % (FLUSH) 0.9 %
5-40 SYRINGE (ML) INJECTION PRN
Status: DISCONTINUED | OUTPATIENT
Start: 2024-04-24 | End: 2024-04-25 | Stop reason: HOSPADM

## 2024-04-24 RX ORDER — LOSARTAN POTASSIUM 50 MG/1
100 TABLET ORAL DAILY
Status: DISCONTINUED | OUTPATIENT
Start: 2024-04-24 | End: 2024-04-25 | Stop reason: HOSPADM

## 2024-04-24 RX ORDER — POTASSIUM CHLORIDE 7.45 MG/ML
10 INJECTION INTRAVENOUS PRN
Status: DISCONTINUED | OUTPATIENT
Start: 2024-04-24 | End: 2024-04-25 | Stop reason: HOSPADM

## 2024-04-24 RX ORDER — POTASSIUM CHLORIDE 20 MEQ/1
40 TABLET, EXTENDED RELEASE ORAL PRN
Status: DISCONTINUED | OUTPATIENT
Start: 2024-04-24 | End: 2024-04-25 | Stop reason: HOSPADM

## 2024-04-24 RX ORDER — AMLODIPINE BESYLATE 5 MG/1
10 TABLET ORAL DAILY
Status: DISCONTINUED | OUTPATIENT
Start: 2024-04-24 | End: 2024-04-25 | Stop reason: HOSPADM

## 2024-04-24 RX ORDER — POLYETHYLENE GLYCOL 3350 17 G/17G
17 POWDER, FOR SOLUTION ORAL DAILY PRN
Status: DISCONTINUED | OUTPATIENT
Start: 2024-04-24 | End: 2024-04-25 | Stop reason: HOSPADM

## 2024-04-24 RX ORDER — SODIUM CHLORIDE 9 MG/ML
INJECTION, SOLUTION INTRAVENOUS CONTINUOUS
Status: DISCONTINUED | OUTPATIENT
Start: 2024-04-24 | End: 2024-04-25 | Stop reason: HOSPADM

## 2024-04-24 RX ORDER — ACETAMINOPHEN 650 MG/1
650 SUPPOSITORY RECTAL EVERY 6 HOURS PRN
Status: DISCONTINUED | OUTPATIENT
Start: 2024-04-24 | End: 2024-04-25 | Stop reason: HOSPADM

## 2024-04-24 RX ORDER — SODIUM CHLORIDE 9 MG/ML
INJECTION, SOLUTION INTRAVENOUS PRN
Status: DISCONTINUED | OUTPATIENT
Start: 2024-04-24 | End: 2024-04-25 | Stop reason: HOSPADM

## 2024-04-24 RX ORDER — GABAPENTIN 400 MG/1
800 CAPSULE ORAL 3 TIMES DAILY
Status: DISCONTINUED | OUTPATIENT
Start: 2024-04-24 | End: 2024-04-25 | Stop reason: HOSPADM

## 2024-04-24 RX ORDER — ACETAMINOPHEN 325 MG/1
650 TABLET ORAL EVERY 6 HOURS PRN
Status: DISCONTINUED | OUTPATIENT
Start: 2024-04-24 | End: 2024-04-25 | Stop reason: HOSPADM

## 2024-04-24 RX ORDER — HYDROCODONE BITARTRATE AND ACETAMINOPHEN 5; 325 MG/1; MG/1
1 TABLET ORAL
Status: COMPLETED | OUTPATIENT
Start: 2024-04-24 | End: 2024-04-24

## 2024-04-24 RX ORDER — ENOXAPARIN SODIUM 100 MG/ML
30 INJECTION SUBCUTANEOUS 2 TIMES DAILY
Status: DISCONTINUED | OUTPATIENT
Start: 2024-04-24 | End: 2024-04-25 | Stop reason: HOSPADM

## 2024-04-24 RX ORDER — MAGNESIUM SULFATE IN WATER 40 MG/ML
2000 INJECTION, SOLUTION INTRAVENOUS PRN
Status: DISCONTINUED | OUTPATIENT
Start: 2024-04-24 | End: 2024-04-25 | Stop reason: HOSPADM

## 2024-04-24 RX ORDER — ALBUTEROL SULFATE 90 UG/1
2 AEROSOL, METERED RESPIRATORY (INHALATION) EVERY 4 HOURS PRN
Status: DISCONTINUED | OUTPATIENT
Start: 2024-04-24 | End: 2024-04-25 | Stop reason: HOSPADM

## 2024-04-24 RX ORDER — ONDANSETRON 4 MG/1
4 TABLET, ORALLY DISINTEGRATING ORAL EVERY 8 HOURS PRN
Status: DISCONTINUED | OUTPATIENT
Start: 2024-04-24 | End: 2024-04-25 | Stop reason: HOSPADM

## 2024-04-24 RX ORDER — ASPIRIN 81 MG/1
81 TABLET ORAL DAILY
Status: DISCONTINUED | OUTPATIENT
Start: 2024-04-24 | End: 2024-04-25 | Stop reason: HOSPADM

## 2024-04-24 RX ORDER — BUDESONIDE AND FORMOTEROL FUMARATE DIHYDRATE 160; 4.5 UG/1; UG/1
2 AEROSOL RESPIRATORY (INHALATION)
Status: DISCONTINUED | OUTPATIENT
Start: 2024-04-24 | End: 2024-04-25 | Stop reason: HOSPADM

## 2024-04-24 RX ORDER — OXYCODONE HYDROCHLORIDE 5 MG/1
5 TABLET ORAL EVERY 4 HOURS PRN
Status: DISCONTINUED | OUTPATIENT
Start: 2024-04-24 | End: 2024-04-25 | Stop reason: HOSPADM

## 2024-04-24 RX ORDER — NICOTINE 21 MG/24HR
1 PATCH, TRANSDERMAL 24 HOURS TRANSDERMAL DAILY
Status: DISCONTINUED | OUTPATIENT
Start: 2024-04-24 | End: 2024-04-25 | Stop reason: HOSPADM

## 2024-04-24 RX ADMIN — SODIUM CHLORIDE 1000 ML: 9 INJECTION, SOLUTION INTRAVENOUS at 03:27

## 2024-04-24 RX ADMIN — PIPERACILLIN AND TAZOBACTAM 3375 MG: 3; .375 INJECTION, POWDER, LYOPHILIZED, FOR SOLUTION INTRAVENOUS at 09:12

## 2024-04-24 RX ADMIN — AMLODIPINE BESYLATE 10 MG: 5 TABLET ORAL at 18:15

## 2024-04-24 RX ADMIN — PIPERACILLIN AND TAZOBACTAM 4500 MG: 4; .5 INJECTION, POWDER, LYOPHILIZED, FOR SOLUTION INTRAVENOUS at 01:18

## 2024-04-24 RX ADMIN — ASPIRIN 81 MG: 81 TABLET, COATED ORAL at 18:15

## 2024-04-24 RX ADMIN — CLINDAMYCIN IN 5 PERCENT DEXTROSE 600 MG: 12 INJECTION, SOLUTION INTRAVENOUS at 04:53

## 2024-04-24 RX ADMIN — ENOXAPARIN SODIUM 30 MG: 100 INJECTION SUBCUTANEOUS at 21:05

## 2024-04-24 RX ADMIN — VANCOMYCIN HYDROCHLORIDE 2500 MG: 1.25 INJECTION, POWDER, LYOPHILIZED, FOR SOLUTION INTRAVENOUS at 02:02

## 2024-04-24 RX ADMIN — SODIUM CHLORIDE, PRESERVATIVE FREE 10 ML: 5 INJECTION INTRAVENOUS at 09:12

## 2024-04-24 RX ADMIN — CLINDAMYCIN IN 5 PERCENT DEXTROSE 600 MG: 12 INJECTION, SOLUTION INTRAVENOUS at 09:12

## 2024-04-24 RX ADMIN — GABAPENTIN 800 MG: 400 CAPSULE ORAL at 18:15

## 2024-04-24 RX ADMIN — SODIUM CHLORIDE 500 ML: 9 INJECTION, SOLUTION INTRAVENOUS at 01:18

## 2024-04-24 RX ADMIN — SODIUM CHLORIDE: 9 INJECTION, SOLUTION INTRAVENOUS at 04:53

## 2024-04-24 RX ADMIN — GABAPENTIN 800 MG: 400 CAPSULE ORAL at 21:03

## 2024-04-24 RX ADMIN — BUDESONIDE AND FORMOTEROL FUMARATE DIHYDRATE 2 PUFF: 160; 4.5 AEROSOL RESPIRATORY (INHALATION) at 19:28

## 2024-04-24 RX ADMIN — ENOXAPARIN SODIUM 30 MG: 100 INJECTION SUBCUTANEOUS at 09:10

## 2024-04-24 RX ADMIN — CLINDAMYCIN IN 5 PERCENT DEXTROSE 600 MG: 12 INJECTION, SOLUTION INTRAVENOUS at 21:00

## 2024-04-24 RX ADMIN — LOSARTAN POTASSIUM 100 MG: 50 TABLET, FILM COATED ORAL at 18:15

## 2024-04-24 RX ADMIN — OXYCODONE 5 MG: 5 TABLET ORAL at 09:10

## 2024-04-24 RX ADMIN — HYDROCODONE BITARTRATE AND ACETAMINOPHEN 1 TABLET: 5; 325 TABLET ORAL at 00:25

## 2024-04-24 RX ADMIN — PIPERACILLIN AND TAZOBACTAM 3375 MG: 3; .375 INJECTION, POWDER, LYOPHILIZED, FOR SOLUTION INTRAVENOUS at 14:13

## 2024-04-24 RX ADMIN — PIPERACILLIN AND TAZOBACTAM 3375 MG: 3; .375 INJECTION, POWDER, LYOPHILIZED, FOR SOLUTION INTRAVENOUS at 23:43

## 2024-04-24 RX ADMIN — OXYCODONE 5 MG: 5 TABLET ORAL at 21:03

## 2024-04-24 RX ADMIN — SODIUM CHLORIDE 1250 MG: 9 INJECTION, SOLUTION INTRAVENOUS at 21:02

## 2024-04-24 RX ADMIN — CLINDAMYCIN IN 5 PERCENT DEXTROSE 600 MG: 12 INJECTION, SOLUTION INTRAVENOUS at 14:13

## 2024-04-24 ASSESSMENT — ENCOUNTER SYMPTOMS
BLOOD IN STOOL: 0
SHORTNESS OF BREATH: 0
VOMITING: 0
BACK PAIN: 0
NAUSEA: 0
COLOR CHANGE: 1
PHOTOPHOBIA: 0
COUGH: 0
WHEEZING: 0
COUGH: 1
EYE REDNESS: 0
SORE THROAT: 0
ABDOMINAL PAIN: 0
DIARRHEA: 0
TROUBLE SWALLOWING: 0

## 2024-04-24 ASSESSMENT — PAIN DESCRIPTION - ORIENTATION
ORIENTATION: LEFT

## 2024-04-24 ASSESSMENT — PAIN SCALES - GENERAL
PAINLEVEL_OUTOF10: 8
PAINLEVEL_OUTOF10: 9
PAINLEVEL_OUTOF10: 4
PAINLEVEL_OUTOF10: 8
PAINLEVEL_OUTOF10: 5
PAINLEVEL_OUTOF10: 8

## 2024-04-24 ASSESSMENT — PAIN - FUNCTIONAL ASSESSMENT: PAIN_FUNCTIONAL_ASSESSMENT: ACTIVITIES ARE NOT PREVENTED

## 2024-04-24 ASSESSMENT — PAIN DESCRIPTION - LOCATION
LOCATION: ARM

## 2024-04-24 ASSESSMENT — PAIN DESCRIPTION - DESCRIPTORS
DESCRIPTORS: ACHING;DISCOMFORT
DESCRIPTORS: ACHING
DESCRIPTORS: ACHING

## 2024-04-24 NOTE — ED PROVIDER NOTES
[facility] EMERGENCY DEPT  EMERGENCY DEPARTMENT HISTORY AND PHYSICAL EXAM      Date: 4/23/2024  Patient Name: Harry Wang  MRN: [id number]  Birthdate 1955  Date of evaluation: 4/23/2024  Provider: MARA Sal   Note Started: 11:29 PM EDT 4/23/24    HISTORY OF PRESENT ILLNESS     Chief Complaint   Patient presents with    Arm Pain       History Provided By: Patient    HPI: Harry Wang is a 68 y.o. male with past medical history as listed below presents to the ED with L elbow pain and swelling.  He reports that he had squamous cell cancer removed from the L elbow in March of 2023. His surgeon did recommend a skin graft to cover the area, but he chose to allow it to heal on its own. Since then, the wound has been healing well until he recently fell onto the elbow, and 4 nights ago he pulled the new scab off in his sleep. Since the scab was pulled off, the left elbow has become significantly red and swollen. He also reports redness on his upper arm, warmth of the entire arm, and swelling in his axilla. He states that he is in a significant amount of pain right now.  He denies any history of diabetes or poor wound healing.  He denies any chest pain, shortness of breath, abdominal pain, nausea, vomiting, or diarrhea.  He does report feeling feverish at home but did not check his temperature. He has not recently been on any antibiotics.     PAST MEDICAL HISTORY   Past Medical History:  Past Medical History:   Diagnosis Date    Chronic obstructive pulmonary disease (HCC)     GERD (gastroesophageal reflux disease)     History of vascular access device 11/08/2022    [facility] VAT placed 4 fr single PICC, rt basilic 46 cm 2 cm out, arm cir 41 cm    Hypertension     DOMI (obstructive sleep apnea) 12/16/2019    Skin cancer        Past Surgical History:  Past Surgical History:   Procedure Laterality Date    UROLOGICAL SURGERY         Family History:  Family History   Problem Relation Age of Onset    Hypertension Mother

## 2024-04-24 NOTE — CONSULTS
General Surgery Consultation      History of Present Illness      Harry Wang is an 68 y.o.  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 He has a wide extensive resection by Dr Jeevan Kam of linnea at [facility] and has a large wound with chronic prolonged healing. He was also seeing by Plastic surgery for consideration of flap vs skin grafting. He had since healed the wound with wound vac treatment and secondary intention healing. No plastic surgery procedures done in the interim. Two days before he fell on his left elbow and may have scratched it vigorously as well. He comes in with cellulitis and surgery was consulted      Past Medical History:   Diagnosis Date    Chronic obstructive pulmonary disease (HCC)     GERD (gastroesophageal reflux disease)     History of vascular access device 11/08/2022    [facility] VAT placed 4 fr single PICC, rt basilic 46 cm 2 cm out, arm cir 41 cm    Hypertension     DOMI (obstructive sleep apnea) 12/16/2019    Skin cancer      Family History   Problem Relation Age of Onset    Hypertension Mother      Prior to Admission Medications   Prescriptions Last Dose Informant Patient Reported? Taking?   albuterol sulfate HFA (PROVENTIL;VENTOLIN;PROAIR) 108 (90 Base) MCG/ACT inhaler 4/23/2024  Yes No   Sig: Inhale 2 puffs into the lungs every 4 hours as needed   amLODIPine (NORVASC) 10 MG tablet   No No   Sig: Take 1 tablet by mouth daily   aspirin 81 MG EC tablet Not Taking  Yes No   Sig: Take 1 tablet by mouth daily   Patient not taking: Reported on 4/24/2024   cephALEXin (KEFLEX) 500 MG capsule 4/23/2024  Yes No   Sig: TAKE 1 CAPSULE BY MOUTH THREE TIMES DAILY FOR 7 DAYS   fluticasone furoate-vilanterol (BREO ELLIPTA) 100-25 MCG/ACT inhaler 4/23/2024  Yes No   Sig: Inhale 1 puff into the lungs daily   fluticasone furoate-vilanterol (BREO ELLIPTA) 100-25 MCG/ACT inhaler 4/23/2024  No No   Sig: Inhale 1 puff into the  lungs daily   gabapentin (NEURONTIN) 800 MG tablet 4/23/2024  No No   Sig: Take 1 tablet by mouth 3 times daily. Max Daily Amount: 2,400 mg   lidocaine viscous hcl (XYLOCAINE) 2 % SOLN solution Not Taking  No No   Sig: Take 15 mLs by mouth every 6 hours as needed (Reflux/abdominal pain) Mix each dose at home with 30 mL over-the-counter Maalox or Mylanta   Patient not taking: Reported on 7/14/2023   losartan (COZAAR) 100 MG tablet 4/23/2024  No No   Sig: Take 1 tablet by mouth daily   omeprazole (PRILOSEC OTC) 20 MG tablet   No No   Sig: Take 1 tablet by mouth daily for 28 days   Patient not taking: Reported on 7/14/2023   tiotropium (SPIRIVA) 18 MCG inhalation capsule 4/23/2024  No No   Sig: INHALE THE CONTENTS OF 1 CAPSULE VIA INHALATION DEVICE EVERY DAY      Facility-Administered Medications: None     No Known Allergies  Social History     Socioeconomic History    Marital status:      Spouse name: Not on file    Number of children: Not on file    Years of education: Not on file    Highest education level: Not on file   Occupational History    Not on file   Tobacco Use    Smoking status: Every Day     Current packs/day: 0.25     Types: Cigarettes    Smokeless tobacco: Never   Substance and Sexual Activity    Alcohol use: Yes     Alcohol/week: 6.0 standard drinks of alcohol    Drug use: Not Currently     Types: Cocaine    Sexual activity: Not on file   Other Topics Concern    Not on file   Social History Narrative    Not on file     Social Determinants of Health     Financial Resource Strain: Low Risk  (7/14/2023)    Overall Financial Resource Strain (CARDIA)     Difficulty of Paying Living Expenses: Not hard at all   Food Insecurity: No Food Insecurity (4/24/2024)    Hunger Vital Sign     Worried About Running Out of Food in the Last Year: Never true     Ran Out of Food in the Last Year: Never true   Transportation Needs: No Transportation Needs (4/24/2024)    PRAPARE - Transportation     Lack of

## 2024-04-24 NOTE — PROGRESS NOTES
4 Eyes Skin Assessment     NAME:  Harry Wang  YOB: 1955  MEDICAL RECORD NUMBER:  [id number]    The patient is being assessed for  Other weekly    I agree that at least one RN has performed a thorough Head to Toe Skin Assessment on the patient. ALL assessment sites listed below have been assessed.      Areas assessed by both nurses:    Head, Face, Ears, Shoulders, Back, Chest, Arms, Elbows, Hands, Sacrum. Buttock, Coccyx, Ischium, and Legs. Feet and Heels        Does the Patient have a Wound? Yes wound(s) were present on assessment. LDA wound assessment was Initiated and completed by RN       Jason Prevention initiated by RN: Yes  Wound Care Orders initiated by RN: No    Pressure Injury (Stage 3,4, Unstageable, DTI, NWPT, and Complex wounds) if present, place Wound referral order by RN under : No    New Ostomies, if present place, Ostomy referral order under : No     Nurse 1 eSignature: Electronically signed by Saige Manuel RN on 4/24/24 at 6:32 PM EDT    **SHARE this note so that the co-signing nurse can place an eSignature**    Nurse 2 eSignature: Electronically signed by Mariela Inman RN on 4/24/24 at 6:33 PM EDT

## 2024-04-24 NOTE — ED NOTES
ED TO INPATIENT SBAR HANDOFF    Patient Name: Harry Wang   Preferred Name: Harry  : 1955  68 y.o.   Family/Caregiver Present: no   Code Status Order: Full Code  PO Status: NPO:No  Telemetry Order:   C-SSRS: Risk of Suicide: No Risk  Sitter no   Restraints:     Sepsis Risk Score Sepsis Risk Score: 1.49    Situation  Chief Complaint   Patient presents with    Arm Pain     Brief Description of Patient's Condition: came to ED for pain & swelling of left elbow   Mental Status: oriented, alert, coherent, logical, thought processes intact, and able to concentrate and follow conversation  Arrived from:  Imaging:   XR ELBOW LEFT (MIN 3 VIEWS)   Final Result   Extensive soft tissue swelling along the dorsum of the forearm and elbow with   possible gas. Joint effusion. Chronic degenerative changes of the elbow.        Abnormal labs:   Abnormal Labs Reviewed   CBC WITH AUTO DIFFERENTIAL - Abnormal; Notable for the following components:       Result Value    MCV 79.0 (*)     MCH 25.0 (*)     RDW 20.0 (*)     Monocytes % 15 (*)     Monocytes Absolute 1.5 (*)     All other components within normal limits   BASIC METABOLIC PANEL - Abnormal; Notable for the following components:    Sodium 133 (*)     Glucose 116 (*)     Bun/Cre Ratio 9 (*)     All other components within normal limits   SEDIMENTATION RATE - Abnormal; Notable for the following components:    Sed Rate, Automated 33 (*)     All other components within normal limits   C-REACTIVE PROTEIN - Abnormal; Notable for the following components:    CRP 7.42 (*)     All other components within normal limits   PROCALCITONIN - Abnormal; Notable for the following components:    Procalcitonin 0.17 (*)     All other components within normal limits       Background  Allergies: No Known Allergies  History:   Past Medical History:   Diagnosis Date    Chronic obstructive pulmonary disease (HCC)     GERD (gastroesophageal reflux disease)     History of vascular access  range)     Or   potassium bicarb-citric acid (EFFER-K) effervescent tablet 40 mEq (has no administration in time range)     Or   potassium chloride 10 mEq/100 mL IVPB (Peripheral Line) (has no administration in time range)   magnesium sulfate 2000 mg in 50 mL IVPB premix (has no administration in time range)   enoxaparin Sodium (LOVENOX) injection 30 mg (has no administration in time range)   ondansetron (ZOFRAN-ODT) disintegrating tablet 4 mg (has no administration in time range)     Or   ondansetron (ZOFRAN) injection 4 mg (has no administration in time range)   polyethylene glycol (GLYCOLAX) packet 17 g (has no administration in time range)   acetaminophen (TYLENOL) tablet 650 mg (has no administration in time range)     Or   acetaminophen (TYLENOL) suppository 650 mg (has no administration in time range)   0.9 % sodium chloride infusion (1,000 mLs IntraVENous New Bag 4/24/24 0327)   oxyCODONE (ROXICODONE) immediate release tablet 5 mg (has no administration in time range)   nicotine (NICODERM CQ) 21 MG/24HR 1 patch (has no administration in time range)   HYDROcodone-acetaminophen (NORCO) 5-325 MG per tablet 1 tablet (1 tablet Oral Given 4/24/24 0025)   piperacillin-tazobactam (ZOSYN) 4,500 mg in sodium chloride 0.9 % 100 mL IVPB (mini-bag) (0 mg IntraVENous Stopped 4/24/24 0159)   sodium chloride 0.9 % bolus 500 mL (0 mLs IntraVENous Stopped 4/24/24 0159)     Last documented pain medication administration: Norco 5-325 given at 0025h today  Pertinent or High Risk Medications/Drips: no   If Yes, please provide details:   Blood Product Administration: no  If Yes, please provide details:   Process Protocols/Bundles:     Recommendation  Incomplete STAT orders:   Overdue Medications: clindamycin IV, Vanc still ongoing  Patient Belongings:    Additional Comments: pt is a&0 x 4, ambulatory  If any further questions, please call Sending RN at [phone]      Admitting Unit Notification  Name of person notified and time: SBAR

## 2024-04-24 NOTE — PROGRESS NOTES
Vancomycin Dosing Consult  Harry Wang is a 68 y.o. male with Left Arm Cellulitis. Pharmacy was consulted by Dr. Almaguer to dose and monitor Vancomycin. Today is day 1.    Antibiotic regimen: Vancomycin + Clindamycin      Temp (24hrs), Av.2 °F (36.8 °C), Min:98.1 °F (36.7 °C), Max:98.2 °F (36.8 °C)    Recent Labs     24  0012   WBC 10.2     Recent Labs     24  0012   CREATININE 1.25   BUN 11       Recent Labs     24  0012   CRP 7.42*     Recent Labs     24  0012   PROCAL 0.17*       Estimated Creatinine Clearance: 79 mL/min (based on SCr of 1.25 mg/dL). ml/min  Concomitant nephrotoxic drugs: None      Cultures:    Blood x 2 - Pending   Wound (Arm) - Pending    MRSA Swab: Not ordered, patient already received first dose of vancomycin    Target range: AUC/JIMBO 400-500, Trough <15 mcg/mL       Assessment/Plan:   Pt received a Vancomycin LD of 2500 mg iv x 1 earlier this morning. Will continue later today with a MD of  1250 mg iv q 18 hrs for a projected AUC of 428. A level has been ordered for tomorrow with AM labs  Antimicrobial stop date 7 days

## 2024-04-24 NOTE — H&P
no wheezes, no accessory muscle use  CV: rrr, no murmur, no leg edema  GI: abdomen soft, NT, ND, active BS, no masses  Skin: good turgor, he has a healing wound across the lateral aspect of his left elbow with a superficial area of opening, surrounding erythema extending to his left forearm and up with a line of lymphangitic spread to his axillary area, good range of motion of his left elbow without significant pain or stiffness  MSK: no paraspinal tenderness, no CVA tenderness  Neuro: alert and oriented times 3, CN grossly intact, no focal strength deficits  Psych: calm and cooperative    24 Hour Results:    Recent Results (from the past 24 hour(s))   CBC with Auto Differential    Collection Time: 04/24/24 12:12 AM   Result Value Ref Range    WBC 10.2 4.1 - 11.1 K/uL    RBC 5.04 4.10 - 5.70 M/uL    Hemoglobin 12.6 12.1 - 17.0 g/dL    Hematocrit 39.8 36.6 - 50.3 %    MCV 79.0 (L) 80.0 - 99.0 FL    MCH 25.0 (L) 26.0 - 34.0 PG    MCHC 31.7 30.0 - 36.5 g/dL    RDW 20.0 (H) 11.5 - 14.5 %    Platelets 257 150 - 400 K/uL    MPV 9.4 8.9 - 12.9 FL    Nucleated RBCs 0.0 0.0  WBC    nRBC 0.00 0.00 - 0.01 K/uL    Neutrophils % 63 32 - 75 %    Lymphocytes % 20 12 - 49 %    Monocytes % 15 (H) 5 - 13 %    Eosinophils % 1 0 - 7 %    Basophils % 1 0 - 1 %    Immature Granulocytes % 0 0 - 0.5 %    Neutrophils Absolute 6.5 1.8 - 8.0 K/UL    Lymphocytes Absolute 2.0 0.8 - 3.5 K/UL    Monocytes Absolute 1.5 (H) 0.0 - 1.0 K/UL    Eosinophils Absolute 0.1 0.0 - 0.4 K/UL    Basophils Absolute 0.1 0.0 - 0.1 K/UL    Immature Granulocytes Absolute 0.0 0.00 - 0.04 K/UL    Differential Type AUTOMATED     Basic Metabolic Panel    Collection Time: 04/24/24 12:12 AM   Result Value Ref Range    Sodium 133 (L) 136 - 145 mmol/L    Potassium 3.6 3.5 - 5.1 mmol/L    Chloride 104 97 - 108 mmol/L    CO2 23 21 - 32 mmol/L    Anion Gap 6 5 - 15 mmol/L    Glucose 116 (H) 65 - 100 mg/dL    BUN 11 6 - 20 mg/dL    Creatinine 1.25 0.70 - 1.30 mg/dL

## 2024-04-24 NOTE — PROGRESS NOTES
4 Eyes Skin Assessment     NAME:  Harry Wang  YOB: 1955  MEDICAL RECORD NUMBER:  [id number]    The patient is being assessed for  Admission    I agree that at least one RN has performed a thorough Head to Toe Skin Assessment on the patient. ALL assessment sites listed below have been assessed.      Areas assessed by both nurses:    Head, Face, Ears, Shoulders, Back, Chest, Arms, Elbows, Hands, Sacrum. Buttock, Coccyx, Ischium, Legs. Feet and Heels, and Under Medical Devices         Does the Patient have a Wound? Yes wound(s) were present on assessment. LDA wound assessment was Initiated and completed by RN       Jason Prevention initiated by RN: Yes  Wound Care Orders initiated by RN: No    Pressure Injury (Stage 3,4, Unstageable, DTI, NWPT, and Complex wounds) if present, place Wound referral order by RN under : No    New Ostomies, if present place, Ostomy referral order under : No     Nurse 1 eSignature: Electronically signed by Liudmila Tony LPN on 4/24/24 at 5:37 AM EDT    **SHARE this note so that the co-signing nurse can place an eSignature**    Nurse 2 eSignature: Electronically signed by JUAN FRANCISCO IQBAL RN on 4/24/24 at 5:37 AM EDT

## 2024-04-24 NOTE — PROGRESS NOTES
Hospitalist Progress Note    NAME:   Harry Wang   : 1955   MRN: [id number]     Date/Time: 2024 11:40 AM  Patient PCP: Mando Liao MD    Estimated discharge date:24 hours  Barriers: IV abx, wound culture    Hospital course:  Harry Wang is a 68 y.o. man with hypertension COPD obstructive sleep apnea who presents for pain and swelling of his left arm.  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  He had a large wound at the time and this has been slowly closing over the last year leaving just a small area that had a crusted lesion which unfortunately he rubbed off by mistake a couple days ago on .  Since then he noticed increasing pain, redness, swelling of his left elbow and forearm.  Labs notable for Pro-Sid of 0.17 and CRP 7.42.  No leukocytosis.  X-ray of the left elbow with extensive soft tissue swelling along the dorsum of the forearm and elbow with possible gas.  General surgery consulted for possible I&D however no evidence of drainable fluid collection.  Patient started on broad-spectrum IV antibiotics.    Assessment / Plan:    Cellulitis left arm  No evidence   General surgery consulted.  No evidence of drainable fluid collection, presentation consistent with cellulitis.  No need for surgical intervention at this time.  Recommended outpatient follow-up with orthopedist.  Continue IV antibiotics: Zosyn, clindamycin, Vanco  Wound culture pending, prelim no organisms seen.  Blood cultures obtained, pending.     Hypertension   Blood pressure stable  Continue losartan and amlodipine     COPD  No acute exacerbation   Continue Symbicort substituted for Breo, Spiriva and as needed albuterol     Obstructive sleep apnea  Noncompliant with CPAP  Declined use of our machine here     Tobacco use  Counseled on cessation   Nicotine patch    Medical Decision Making     [x] High (any 2)     A. Problems (any 1)  [x] Acute/Chronic Illness/injury posing threat

## 2024-04-24 NOTE — CARE COORDINATION
04/24/24 1057   Service Assessment   Patient Orientation Alert and Oriented   Cognition Alert   History Provided By Patient   Primary Caregiver Self   Support Systems Spouse/Significant Other   Patient's Healthcare Decision Maker is: Legal Next of Kin   PCP Verified by CM Yes   Last Visit to PCP Within last year   Prior Functional Level Independent in ADLs/IADLs   Current Functional Level Independent in ADLs/IADLs   Can patient return to prior living arrangement Yes   Ability to make needs known: Good   Family able to assist with home care needs: Yes   Would you like for me to discuss the discharge plan with any other family members/significant others, and if so, who? Yes  (Wife at bedside)   Financial Resources Medicare   Community Resources None   Social/Functional History   Lives With Spouse   Type of Home House   Bathroom Equipment None   Receives Help From Family   ADL Assistance Independent   Discharge Planning   Type of Residence House   Living Arrangements Spouse/Significant Other   Current Services Prior To Admission None   Potential Assistance Needed N/A   Patient expects to be discharged to: House   Services At/After Discharge   Transition of Care Consult (CM Consult) N/A   Services At/After Discharge None   Mode of Transport at Discharge Self   Confirm Follow Up Transport Self     CM assessment complete and demographics confirmed. Patient lives at home with wife. Independent in all care. History of HH but patient cannot recall name. No DME. Preferred pharmacy is [facility].     Advance Care Planning     General Advance Care Planning (ACP) Conversation    Date of Conversation: 4/24/2024  Conducted with: Patient with Decision Making Capacity    Healthcare Decision Maker:    Primary Decision Maker: Deborah Wang - Spouse - [phone]  Click here to complete Healthcare Decision Makers including selection of the Healthcare Decision Maker Relationship (ie \"Primary\").   Today we documented  Decision Maker(s) consistent with Legal Next of Kin hierarchy.    Content/Action Overview:  Has ACP document(s) on file - reflects the patient's care preferences  Reviewed DNR/DNI and patient elects Full Code (Attempt Resuscitation)        Length of Voluntary ACP Conversation in minutes:  <16 minutes (Non-Billable)    Silvia Cervantes

## 2024-04-25 VITALS
OXYGEN SATURATION: 97 % | HEART RATE: 77 BPM | DIASTOLIC BLOOD PRESSURE: 85 MMHG | HEIGHT: 72 IN | BODY MASS INDEX: 38.87 KG/M2 | SYSTOLIC BLOOD PRESSURE: 132 MMHG | WEIGHT: 287 LBS | RESPIRATION RATE: 16 BRPM | TEMPERATURE: 97.7 F

## 2024-04-25 LAB
ALBUMIN SERPL-MCNC: 3.2 G/DL (ref 3.5–5)
ALBUMIN/GLOB SERPL: 0.8 (ref 1.1–2.2)
ALP SERPL-CCNC: 53 U/L (ref 45–117)
ALT SERPL-CCNC: 24 U/L (ref 12–78)
ANION GAP SERPL CALC-SCNC: 4 MMOL/L (ref 5–15)
AST SERPL W P-5'-P-CCNC: 17 U/L (ref 15–37)
BACTERIA SPEC CULT: ABNORMAL
BASOPHILS # BLD: 0.1 K/UL (ref 0–0.1)
BASOPHILS NFR BLD: 1 % (ref 0–1)
BILIRUB SERPL-MCNC: 0.5 MG/DL (ref 0.2–1)
BUN SERPL-MCNC: 8 MG/DL (ref 6–20)
BUN/CREAT SERPL: 7 (ref 12–20)
CA-I BLD-MCNC: 8.3 MG/DL (ref 8.5–10.1)
CHLORIDE SERPL-SCNC: 109 MMOL/L (ref 97–108)
CO2 SERPL-SCNC: 24 MMOL/L (ref 21–32)
CREAT SERPL-MCNC: 1.1 MG/DL (ref 0.7–1.3)
CRP SERPL-MCNC: 3.97 MG/DL (ref 0–0.3)
DATE LAST DOSE: NORMAL
DIFFERENTIAL METHOD BLD: ABNORMAL
DOSE AMOUNT: NORMAL UNITS
EOSINOPHIL # BLD: 0.1 K/UL (ref 0–0.4)
EOSINOPHIL NFR BLD: 1 % (ref 0–7)
ERYTHROCYTE [DISTWIDTH] IN BLOOD BY AUTOMATED COUNT: 19.6 % (ref 11.5–14.5)
GLOBULIN SER CALC-MCNC: 4 G/DL (ref 2–4)
GLUCOSE SERPL-MCNC: 124 MG/DL (ref 65–100)
GRAM STN SPEC: ABNORMAL
GRAM STN SPEC: ABNORMAL
HCT VFR BLD AUTO: 36.5 % (ref 36.6–50.3)
HGB BLD-MCNC: 11.4 G/DL (ref 12.1–17)
IMM GRANULOCYTES # BLD AUTO: 0 K/UL (ref 0–0.04)
IMM GRANULOCYTES NFR BLD AUTO: 0 % (ref 0–0.5)
LYMPHOCYTES # BLD: 1.8 K/UL (ref 0.8–3.5)
LYMPHOCYTES NFR BLD: 31 % (ref 12–49)
Lab: ABNORMAL
MCH RBC QN AUTO: 24.6 PG (ref 26–34)
MCHC RBC AUTO-ENTMCNC: 31.2 G/DL (ref 30–36.5)
MCV RBC AUTO: 78.7 FL (ref 80–99)
MONOCYTES # BLD: 0.9 K/UL (ref 0–1)
MONOCYTES NFR BLD: 15 % (ref 5–13)
NEUTS SEG # BLD: 3.1 K/UL (ref 1.8–8)
NEUTS SEG NFR BLD: 52 % (ref 32–75)
NRBC # BLD: 0 K/UL (ref 0–0.01)
NRBC BLD-RTO: 0 PER 100 WBC
PLATELET # BLD AUTO: 219 K/UL (ref 150–400)
PMV BLD AUTO: 8.9 FL (ref 8.9–12.9)
POTASSIUM SERPL-SCNC: 3.5 MMOL/L (ref 3.5–5.1)
PROCALCITONIN SERPL-MCNC: 0.13 NG/ML
PROT SERPL-MCNC: 7.2 G/DL (ref 6.4–8.2)
RBC # BLD AUTO: 4.64 M/UL (ref 4.1–5.7)
SODIUM SERPL-SCNC: 137 MMOL/L (ref 136–145)
VANCOMYCIN SERPL-MCNC: 11 UG/ML
WBC # BLD AUTO: 5.9 K/UL (ref 4.1–11.1)

## 2024-04-25 PROCEDURE — 6360000002 HC RX W HCPCS: Performed by: STUDENT IN AN ORGANIZED HEALTH CARE EDUCATION/TRAINING PROGRAM

## 2024-04-25 PROCEDURE — 80053 COMPREHEN METABOLIC PANEL: CPT

## 2024-04-25 PROCEDURE — 80202 ASSAY OF VANCOMYCIN: CPT

## 2024-04-25 PROCEDURE — 6360000002 HC RX W HCPCS: Performed by: INTERNAL MEDICINE

## 2024-04-25 PROCEDURE — 86140 C-REACTIVE PROTEIN: CPT

## 2024-04-25 PROCEDURE — 85025 COMPLETE CBC W/AUTO DIFF WBC: CPT

## 2024-04-25 PROCEDURE — 2580000003 HC RX 258: Performed by: INTERNAL MEDICINE

## 2024-04-25 PROCEDURE — 84145 PROCALCITONIN (PCT): CPT

## 2024-04-25 PROCEDURE — 6360000002 HC RX W HCPCS

## 2024-04-25 PROCEDURE — 94640 AIRWAY INHALATION TREATMENT: CPT

## 2024-04-25 PROCEDURE — 94761 N-INVAS EAR/PLS OXIMETRY MLT: CPT

## 2024-04-25 PROCEDURE — 6370000000 HC RX 637 (ALT 250 FOR IP): Performed by: INTERNAL MEDICINE

## 2024-04-25 PROCEDURE — 2580000003 HC RX 258

## 2024-04-25 PROCEDURE — 36415 COLL VENOUS BLD VENIPUNCTURE: CPT

## 2024-04-25 RX ORDER — AMOXICILLIN AND CLAVULANATE POTASSIUM 875; 125 MG/1; MG/1
1 TABLET, FILM COATED ORAL 2 TIMES DAILY
Qty: 20 TABLET | Refills: 0 | Status: SHIPPED | OUTPATIENT
Start: 2024-04-25 | End: 2024-05-05

## 2024-04-25 RX ORDER — NICOTINE 21 MG/24HR
1 PATCH, TRANSDERMAL 24 HOURS TRANSDERMAL DAILY
Qty: 30 PATCH | Refills: 1 | Status: SHIPPED | OUTPATIENT
Start: 2024-04-25

## 2024-04-25 RX ORDER — DOXYCYCLINE HYCLATE 100 MG
100 TABLET ORAL 2 TIMES DAILY
Qty: 20 TABLET | Refills: 0 | Status: SHIPPED | OUTPATIENT
Start: 2024-04-25 | End: 2024-05-05

## 2024-04-25 RX ADMIN — LOSARTAN POTASSIUM 100 MG: 50 TABLET, FILM COATED ORAL at 10:53

## 2024-04-25 RX ADMIN — ASPIRIN 81 MG: 81 TABLET, COATED ORAL at 10:53

## 2024-04-25 RX ADMIN — PIPERACILLIN AND TAZOBACTAM 3375 MG: 3; .375 INJECTION, POWDER, LYOPHILIZED, FOR SOLUTION INTRAVENOUS at 06:52

## 2024-04-25 RX ADMIN — BUDESONIDE AND FORMOTEROL FUMARATE DIHYDRATE 2 PUFF: 160; 4.5 AEROSOL RESPIRATORY (INHALATION) at 07:40

## 2024-04-25 RX ADMIN — AMLODIPINE BESYLATE 10 MG: 5 TABLET ORAL at 10:53

## 2024-04-25 RX ADMIN — CLINDAMYCIN IN 5 PERCENT DEXTROSE 600 MG: 12 INJECTION, SOLUTION INTRAVENOUS at 03:42

## 2024-04-25 RX ADMIN — SODIUM CHLORIDE, PRESERVATIVE FREE 10 ML: 5 INJECTION INTRAVENOUS at 10:54

## 2024-04-25 RX ADMIN — TIOTROPIUM BROMIDE INHALATION SPRAY 2 PUFF: 3.12 SPRAY, METERED RESPIRATORY (INHALATION) at 07:40

## 2024-04-25 RX ADMIN — GABAPENTIN 800 MG: 400 CAPSULE ORAL at 10:53

## 2024-04-25 RX ADMIN — ENOXAPARIN SODIUM 30 MG: 100 INJECTION SUBCUTANEOUS at 10:53

## 2024-04-25 ASSESSMENT — PAIN SCALES - GENERAL: PAINLEVEL_OUTOF10: 0

## 2024-04-25 NOTE — PROGRESS NOTES
Vancomycin Dosing Consult  Harry Wang is a 68 y.o. male with Left Arm Cellulitis. Pharmacy was consulted by Dr. Almaguer to dose and monitor Vancomycin. Today is day 3.    Antibiotic regimen: Vancomycin + Clindamycin      Temp (24hrs), Av.4 °F (36.9 °C), Min:97.9 °F (36.6 °C), Max:99 °F (37.2 °C)    Recent Labs     24  0012 24  0523   WBC 10.2 5.9     Recent Labs     24  0012 24  0523   CREATININE 1.25 1.10   BUN 11 8       Recent Labs     24  0012 24  0523   CRP 7.42* 3.97*     Recent Labs     24  0523   PROCAL 0.17* 0.13*   Recent level history:  Date/Time Dose & Interval Measured Level (mcg/mL) Associated AUC/JIMBO   0523 2500mg x 1, 1250mg q18h 11.0 325          Estimated Creatinine Clearance: 90 mL/min (based on SCr of 1.1 mg/dL). ml/min  Concomitant nephrotoxic drugs:  losartan      Cultures:    Blood x 2 - Pending   Wound (Arm) - Pending    MRSA Swab: Not ordered, patient already received first dose of vancomycin    Target range: AUC/JIMBO 400-600       Assessment/Plan:   WBC 5.9, Procalcitonin 0.13, Afebrile  1250 mg q18h regimen subtherapeutic, changing regimen to vancomycin 1500mg IV q12h, predicted .  Pharmacy to evaluate level  @ 0800  When cultures result, consider de-escalation if appropriate  BMP x 3 days  Antimicrobial stop date 7 days

## 2024-04-25 NOTE — DISCHARGE SUMMARY
Discharge Summary    Name: Harry Wang  [id number]  YOB: 1955 (Age: 68 y.o.)   Date of Admission: 4/23/2024  Date of Discharge: 4/25/2024  Attending Physician: Kalia Rutledge MD    Discharge Diagnosis:   Principal Problem:    Cellulitis  Active problems:   Hypertension  COPD  DOMI  Resolved Problems:    * No resolved hospital problems. *       Consultations:  IP CONSULT TO PHARMACY  IP CONSULT TO GENERAL SURGERY      Brief Admission History/Reason for Admission Per Cynthia Almaguer MD/Brief Hospital Course by Main Problems:   Harry Wang is a 68 y.o. man with hypertension COPD obstructive sleep apnea who presented for pain and swelling of his left arm.  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  He had a large wound at the time and this has been slowly closing over the last year leaving just a small area that had a crusted lesion which unfortunately he rubbed off by mistake a couple days ago on Sunday. Patient also reports he fell onto this arm. Since then he noticed increasing pain, redness, swelling of his left elbow and forearm.  Labs notable for Pro-Sid of 0.17 and CRP 7.42.  No leukocytosis.  X-ray of the left elbow with extensive soft tissue swelling along the dorsum of the forearm and elbow with possible gas.  Patient started on broad-spectrum IV antibiotics and admitted for further workup and treatment.  General surgery consulted for possible I&D however no evidence of drainable fluid collection or evidence of necrotizing infection. Presentation consistent with cellulitis. Patient responded well to antibiotics with decreasing CRP and procal. Prelim wound culture with beta-hemolytic group B strep.  Patient with significantly improved pain, no difficulty with range of motion of left elbow.  Erythema improving.  Patient afebrile.  Will discharge patient

## 2024-04-25 NOTE — PROGRESS NOTES
Patient tolerating ATB without reaction. Independent in room. Assessment unchanged throughout night.

## 2024-04-25 NOTE — ADT AUTH CERT
Trini Ramirez PA-C  Physician Assistant  Internal Medicine     Discharge Summary      Cosign Needed     Date of Service: 4/25/2024 10:55 AM     Cosign Needed                                                Discharge Summary     Name: Harry Wang  [id number]  YOB: 1955 (Age: 68 y.o.)           Date of Admission: 4/23/2024  Date of Discharge: 4/25/2024  Attending Physician: Kalia Rutledge MD     Discharge Diagnosis:   Principal Problem:    Cellulitis  Active problems:   Hypertension  COPD  DOMI  Resolved Problems:    * No resolved hospital problems. *        Consultations:  IP CONSULT TO PHARMACY  IP CONSULT TO GENERAL SURGERY        Brief Admission History/Reason for Admission Per Cynthia Almaguer MD/Brief Hospital Course by Main Problems:   Harry Wang is a 68 y.o. man with hypertension COPD obstructive sleep apnea who presented for pain and swelling of his left arm.  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  He had a large wound at the time and this has been slowly closing over the last year leaving just a small area that had a crusted lesion which unfortunately he rubbed off by mistake a couple days ago on Sunday. Patient also reports he fell onto this arm. Since then he noticed increasing pain, redness, swelling of his left elbow and forearm.  Labs notable for Pro-Sid of 0.17 and CRP 7.42.  No leukocytosis.  X-ray of the left elbow with extensive soft tissue swelling along the dorsum of the forearm and elbow with possible gas.  Patient started on broad-spectrum IV antibiotics and admitted for further workup and treatment.  General surgery consulted for possible I&D however no evidence of drainable fluid collection or evidence of necrotizing infection. Presentation consistent with cellulitis. Patient responded well to antibiotics with decreasing CRP and procal. Prelim wound culture with

## 2024-04-25 NOTE — CARE COORDINATION
Patient discharging home today, self care.    Transition of Care Plan:    RUR: \"calculating\"  Prior Level of Functioning: ind  Disposition: ind/home  If SNF or IPR: Date FOC offered: na  Date FOC received: na  Accepting facility: na  Date authorization started with reference number: na  Date authorization received and expires: na  Follow up appointments:   DME needed: na  Transportation at discharge: friend/family  IM/IMM Medicare/ letter given: 04/25/2024  Is patient a [place] and connected with [facility]? na  If yes, was  transfer form completed and [facility] notified? na  Caregiver Contact: na  Discharge Caregiver contacted prior to discharge? na  Care Conference needed? na  Barriers to discharge: na

## 2024-04-25 NOTE — PLAN OF CARE
Problem: Safety - Adult  Goal: Free from fall injury  4/24/2024 2309 by Shereen Mcneil RN  Outcome: Progressing  4/24/2024 0924 by Saige Manuel RN  Outcome: Progressing  4/24/2024 0924 by Saige Manuel RN  Outcome: Progressing     Problem: ABCDS Injury Assessment  Goal: Absence of physical injury  4/24/2024 2309 by Shereen Mcneil RN  Outcome: Progressing  4/24/2024 0924 by Saige Manuel RN  Outcome: Progressing  4/24/2024 0924 by Saige Manuel RN  Outcome: Progressing     Problem: Discharge Planning  Goal: Discharge to home or other facility with appropriate resources  4/24/2024 2309 by Shereen Mcneil RN  Outcome: Progressing  4/24/2024 0924 by Saige Manuel RN  Outcome: Progressing  4/24/2024 0924 by Saige Manuel RN  Outcome: Progressing     Problem: Pain  Goal: Verbalizes/displays adequate comfort level or baseline comfort level  4/24/2024 2309 by Shereen Mcneil RN  Outcome: Progressing  4/24/2024 0924 by Saige Manuel RN  Outcome: Progressing

## 2024-04-28 LAB
BACTERIA SPEC CULT: NORMAL
BACTERIA SPEC CULT: NORMAL
Lab: NORMAL
Lab: NORMAL

## 2024-05-01 LAB
BACTERIA SPEC CULT: NORMAL
BACTERIA SPEC CULT: NORMAL
Lab: NORMAL
Lab: NORMAL

## 2024-05-06 ENCOUNTER — OFFICE VISIT (OUTPATIENT)
Age: 69
End: 2024-05-06
Payer: MEDICARE

## 2024-05-06 VITALS
BODY MASS INDEX: 37.93 KG/M2 | HEART RATE: 79 BPM | WEIGHT: 280 LBS | DIASTOLIC BLOOD PRESSURE: 85 MMHG | OXYGEN SATURATION: 96 % | HEIGHT: 72 IN | SYSTOLIC BLOOD PRESSURE: 131 MMHG | TEMPERATURE: 98.8 F | RESPIRATION RATE: 16 BRPM

## 2024-05-06 DIAGNOSIS — I10 ESSENTIAL HYPERTENSION: ICD-10-CM

## 2024-05-06 DIAGNOSIS — I73.9 PERIPHERAL VASCULAR DISEASE, UNSPECIFIED (HCC): ICD-10-CM

## 2024-05-06 DIAGNOSIS — J43.9 PULMONARY EMPHYSEMA, UNSPECIFIED EMPHYSEMA TYPE (HCC): ICD-10-CM

## 2024-05-06 DIAGNOSIS — M70.22 OLECRANON BURSITIS OF LEFT ELBOW: ICD-10-CM

## 2024-05-06 DIAGNOSIS — E66.01 SEVERE OBESITY (BMI 35.0-39.9) WITH COMORBIDITY (HCC): ICD-10-CM

## 2024-05-06 DIAGNOSIS — Z00.01 ENCOUNTER FOR MEDICARE ANNUAL EXAMINATION WITH ABNORMAL FINDINGS: Primary | ICD-10-CM

## 2024-05-06 PROCEDURE — G0439 PPPS, SUBSEQ VISIT: HCPCS | Performed by: FAMILY MEDICINE

## 2024-05-06 PROCEDURE — 99214 OFFICE O/P EST MOD 30 MIN: CPT | Performed by: FAMILY MEDICINE

## 2024-05-06 PROCEDURE — 1123F ACP DISCUSS/DSCN MKR DOCD: CPT | Performed by: FAMILY MEDICINE

## 2024-05-06 PROCEDURE — 3075F SYST BP GE 130 - 139MM HG: CPT | Performed by: FAMILY MEDICINE

## 2024-05-06 PROCEDURE — 3079F DIAST BP 80-89 MM HG: CPT | Performed by: FAMILY MEDICINE

## 2024-05-06 RX ORDER — TIOTROPIUM BROMIDE 18 UG/1
CAPSULE ORAL; RESPIRATORY (INHALATION)
Qty: 30 CAPSULE | Refills: 5 | Status: SHIPPED | OUTPATIENT
Start: 2024-05-06 | End: 2024-05-06 | Stop reason: SDUPTHER

## 2024-05-06 RX ORDER — FLUTICASONE FUROATE AND VILANTEROL 100; 25 UG/1; UG/1
1 POWDER RESPIRATORY (INHALATION) DAILY
Qty: 3 EACH | Refills: 3 | Status: SHIPPED | OUTPATIENT
Start: 2024-05-06

## 2024-05-06 RX ORDER — CEPHALEXIN 500 MG/1
500 CAPSULE ORAL 4 TIMES DAILY
Qty: 30 CAPSULE | Refills: 0 | Status: SHIPPED | OUTPATIENT
Start: 2024-05-06

## 2024-05-06 RX ORDER — TIOTROPIUM BROMIDE 18 UG/1
CAPSULE ORAL; RESPIRATORY (INHALATION)
Qty: 90 CAPSULE | Refills: 3 | Status: SHIPPED | OUTPATIENT
Start: 2024-05-06

## 2024-05-06 RX ORDER — FLUTICASONE FUROATE AND VILANTEROL 100; 25 UG/1; UG/1
1 POWDER RESPIRATORY (INHALATION) DAILY
Qty: 1 EACH | Refills: 5 | Status: SHIPPED | OUTPATIENT
Start: 2024-05-06 | End: 2024-05-06 | Stop reason: SDUPTHER

## 2024-05-06 RX ORDER — NYSTATIN 100000 [USP'U]/G
POWDER TOPICAL
Qty: 60 G | Refills: 1 | Status: SHIPPED | OUTPATIENT
Start: 2024-05-06

## 2024-05-06 ASSESSMENT — LIFESTYLE VARIABLES
HOW OFTEN DO YOU HAVE A DRINK CONTAINING ALCOHOL: MONTHLY OR LESS
HOW MANY STANDARD DRINKS CONTAINING ALCOHOL DO YOU HAVE ON A TYPICAL DAY: 1 OR 2

## 2024-05-06 ASSESSMENT — PATIENT HEALTH QUESTIONNAIRE - PHQ9
2. FEELING DOWN, DEPRESSED OR HOPELESS: NOT AT ALL
SUM OF ALL RESPONSES TO PHQ QUESTIONS 1-9: 0
SUM OF ALL RESPONSES TO PHQ9 QUESTIONS 1 & 2: 0
SUM OF ALL RESPONSES TO PHQ QUESTIONS 1-9: 0
1. LITTLE INTEREST OR PLEASURE IN DOING THINGS: NOT AT ALL

## 2024-05-06 NOTE — PATIENT INSTRUCTIONS
the bathroom with you.   Where can you learn more?  Go to https://www.TuneWiki.net/patientEd and enter G117 to learn more about \"Preventing Falls: Care Instructions.\"  Current as of: July 17, 2023               Content Version: 14.0  © 5675-8558 [employer].   Care instructions adapted under license by [employer]. If you have questions about a medical condition or this instruction, always ask your healthcare professional. [employer] disclaims any warranty or liability for your use of this information.           Learning About Being Active as an Older Adult  Why is being active important as you get older?     Being active is one of the best things you can do for your health. And it's never too late to start. Being active--or getting active, if you aren't already--has definite benefits. It can:  Give you more energy,  Keep your mind sharp.  Improve balance to reduce your risk of falls.  Help you manage chronic illness with fewer medicines.  No matter how old you are, how fit you are, or what health problems you have, there is a form of activity that will work for you. And the more physical activity you can do, the better your overall health will be.  What kinds of activity can help you stay healthy?  Being more active will make your daily activities easier. Physical activity includes planned exercise and things you do in daily life. There are four types of activity:  Aerobic.  Doing aerobic activity makes your heart and lungs strong.  Includes walking, dancing, and gardening.  Aim for at least 2½ hours spread throughout the week.  It improves your energy and can help you sleep better.  Muscle-strengthening.  This type of activity can help maintain muscle and strengthen bones.  Includes climbing stairs, using resistance bands, and lifting or carrying heavy loads.  Aim for at least twice a week.  It can help protect the knees and other joints.  Stretching.  Stretching gives you better

## 2024-05-06 NOTE — PROGRESS NOTES
Chief Complaint   Patient presents with    Medicare AWV     Patient presents in office today for AMW visit.  Also here for KEYONNA from [facility].  Was admitted on 4/23/24 for cellulitis of left arm.  Discharged on 4/25/24.  States that he was sent home on Augmentin and doxy.  He started to break out in a rash half way through the abx so he stopped.  No other concerns.    \"Have you been to the ER, urgent care clinic since your last visit?  Hospitalized since your last visit?\"    YES - When: approximately 4/23/24 ago.  Where and Why: [facility] for cellulitis of left arm.    “Have you seen or consulted any other health care providers outside of [facility] since your last visit?”    NO        “Have you had a colorectal cancer screening such as a colonoscopy/FIT/Cologuard?    NO    No colonoscopy on file  Date of last Cologuard: 1/4/2021  Date of last FIT: 12/24/2020   No flexible sigmoidoscopy on file         Click Here for Release of Records Request

## 2024-05-06 NOTE — PROGRESS NOTES
Chief Complaint   Patient presents with    Medicare AWV     Patient presents in office today for AMW visit.  Also here for KEYONNA from [facility].  Was admitted on 24 for cellulitis of left arm.  Discharged on 24.  States that he was sent home on Augmentin and doxy.  He started to break out in a rash half way through the abx so he stopped.  No other concerns.    \"Have you been to the ER, urgent care clinic since your last visit?  Hospitalized since your last visit?\"    YES - When: approximately 24 ago.  Where and Why: [facility] for cellulitis of left arm.    “Have you seen or consulted any other health care providers outside of [facility] since your last visit?”    NO        “Have you had a colorectal cancer screening such as a colonoscopy/FIT/Cologuard?    NO    No colonoscopy on file  Date of last Cologuard: 2021  Date of last FIT: 2020   No flexible sigmoidoscopy on file         Click Here for Release of Records Request    Harry SCHUMACHER Wang (:  1955) is a 68 y.o. male, here for evaluation of the following chief complaint(s):  Medicare AWV         Assessment & Plan      Results    1. Encounter for Medicare annual examination with abnormal findings  2. Severe obesity (BMI 35.0-39.9) with comorbidity (HCC)  3. Pulmonary emphysema, unspecified emphysema type (HCC)  4. Peripheral vascular disease, unspecified (HCC)  5. Olecranon bursitis of left elbow  6. Essential hypertension    No follow-ups on file.       Medicare Annual Wellness Visit    Harry ENDY Wang is here for Medicare AWV        ICD-10-CM    1. Encounter for Medicare annual examination with abnormal findings  Z00.01       2. Severe obesity (BMI 35.0-39.9) with comorbidity (HCC)  E66.01       3. Pulmonary emphysema, unspecified emphysema type (HCC)  J43.9       4. Peripheral vascular disease, unspecified (HCC)  I73.9       5. Olecranon bursitis of left elbow  M70.22       6. Essential hypertension  I10           Time was

## 2024-07-16 DIAGNOSIS — I73.9 PERIPHERAL VASCULAR DISEASE (HCC): ICD-10-CM

## 2024-07-17 RX ORDER — GABAPENTIN 800 MG/1
TABLET ORAL
Qty: 270 TABLET | Refills: 0 | Status: SHIPPED | OUTPATIENT
Start: 2024-07-17 | End: 2025-07-17

## 2024-08-13 ENCOUNTER — TELEPHONE (OUTPATIENT)
Age: 69
End: 2024-08-13

## 2024-08-13 NOTE — TELEPHONE ENCOUNTER
Harry Wang needs a refill of fluticasone furoate-vilanterol (BREO ELLIPTA) 100-25 MCG/ACT inhaler .  They have ? pills/supply left and are requesting a 90 day supply with refills.  Pharmacy has been updated in the chart. Patient was advised or scheduled an appointment for the future and to request refills thru the [facility] or by requesting a refill from their pharmacy in the future.  Patient was also advised to check with their pharmacy for status of when refills are available.    Harry Wang needs a refill of tiotropium (SPIRIVA) 18 MCG inhalation capsule .  They have ? pills/supply left and are requesting a 90 day supply with refills.  Pharmacy has been updated in the chart. Patient was advised or scheduled an appointment for the future and to request refills thru the [facility] or by requesting a refill from their pharmacy in the future.  Patient was also advised to check with their pharmacy for status of when refills are available.

## 2024-08-14 RX ORDER — FLUTICASONE FUROATE AND VILANTEROL 100; 25 UG/1; UG/1
1 POWDER RESPIRATORY (INHALATION) DAILY
Qty: 3 EACH | Refills: 3 | Status: SHIPPED | OUTPATIENT
Start: 2024-08-14

## 2024-08-14 RX ORDER — TIOTROPIUM BROMIDE 18 UG/1
CAPSULE ORAL; RESPIRATORY (INHALATION)
Qty: 90 CAPSULE | Refills: 3 | Status: SHIPPED | OUTPATIENT
Start: 2024-08-14

## 2024-09-10 RX ORDER — AMLODIPINE BESYLATE 10 MG/1
10 TABLET ORAL DAILY
Qty: 90 TABLET | Refills: 3 | Status: SHIPPED | OUTPATIENT
Start: 2024-09-10

## 2024-10-30 NOTE — PROGRESS NOTES
Patient has graduated from the Transitions of Care Coordination  program on 4/28/23. Patient/family has the ability to self-manage at this time Care management goals have been completed. Patient was not referred to the [facility] team for further management. Patient has Care Transition Nurse's contact information for any further questions, concerns, or needs.   Patients upcoming visits:    Future Appointments   Date Time Provider Ruben Carlos   5/1/2023  3:45 PM Nkechi Capone MD [facility] AMB 93

## 2024-12-09 DIAGNOSIS — I73.9 PERIPHERAL VASCULAR DISEASE (HCC): ICD-10-CM

## 2024-12-09 RX ORDER — GABAPENTIN 800 MG/1
TABLET ORAL
Qty: 270 TABLET | Refills: 0 | Status: SHIPPED | OUTPATIENT
Start: 2024-12-09 | End: 2025-12-09

## 2024-12-12 RX ORDER — LOSARTAN POTASSIUM 100 MG/1
100 TABLET ORAL DAILY
Qty: 90 TABLET | Refills: 5 | Status: SHIPPED | OUTPATIENT
Start: 2024-12-12

## 2025-02-20 ENCOUNTER — COMMUNITY OUTREACH (OUTPATIENT)
Facility: CLINIC | Age: 70
End: 2025-02-20

## 2025-03-10 ENCOUNTER — CLINICAL DOCUMENTATION (OUTPATIENT)
Facility: CLINIC | Age: 70
End: 2025-03-10

## 2025-03-13 ENCOUNTER — CLINICAL DOCUMENTATION (OUTPATIENT)
Facility: CLINIC | Age: 70
End: 2025-03-13

## 2025-03-17 DIAGNOSIS — I73.9 PERIPHERAL VASCULAR DISEASE: ICD-10-CM

## 2025-03-18 RX ORDER — GABAPENTIN 800 MG/1
TABLET ORAL
Qty: 90 TABLET | Refills: 0 | Status: SHIPPED | OUTPATIENT
Start: 2025-03-18 | End: 2026-03-18

## 2025-04-07 ENCOUNTER — TELEPHONE (OUTPATIENT)
Facility: CLINIC | Age: 70
End: 2025-04-07

## 2025-04-07 SDOH — ECONOMIC STABILITY: INCOME INSECURITY: IN THE LAST 12 MONTHS, WAS THERE A TIME WHEN YOU WERE NOT ABLE TO PAY THE MORTGAGE OR RENT ON TIME?: NO

## 2025-04-07 SDOH — ECONOMIC STABILITY: FOOD INSECURITY: WITHIN THE PAST 12 MONTHS, THE FOOD YOU BOUGHT JUST DIDN'T LAST AND YOU DIDN'T HAVE MONEY TO GET MORE.: NEVER TRUE

## 2025-04-07 SDOH — ECONOMIC STABILITY: FOOD INSECURITY: WITHIN THE PAST 12 MONTHS, YOU WORRIED THAT YOUR FOOD WOULD RUN OUT BEFORE YOU GOT MONEY TO BUY MORE.: NEVER TRUE

## 2025-04-07 SDOH — HEALTH STABILITY: PHYSICAL HEALTH: ON AVERAGE, HOW MANY DAYS PER WEEK DO YOU ENGAGE IN MODERATE TO STRENUOUS EXERCISE (LIKE A BRISK WALK)?: 3 DAYS

## 2025-04-07 SDOH — HEALTH STABILITY: PHYSICAL HEALTH: ON AVERAGE, HOW MANY MINUTES DO YOU ENGAGE IN EXERCISE AT THIS LEVEL?: 30 MIN

## 2025-04-07 SDOH — ECONOMIC STABILITY: TRANSPORTATION INSECURITY
IN THE PAST 12 MONTHS, HAS THE LACK OF TRANSPORTATION KEPT YOU FROM MEDICAL APPOINTMENTS OR FROM GETTING MEDICATIONS?: NO

## 2025-04-07 SDOH — ECONOMIC STABILITY: TRANSPORTATION INSECURITY
IN THE PAST 12 MONTHS, HAS LACK OF TRANSPORTATION KEPT YOU FROM MEETINGS, WORK, OR FROM GETTING THINGS NEEDED FOR DAILY LIVING?: NO

## 2025-04-07 ASSESSMENT — LIFESTYLE VARIABLES
HOW OFTEN DO YOU HAVE SIX OR MORE DRINKS ON ONE OCCASION: 1
HOW OFTEN DO YOU HAVE A DRINK CONTAINING ALCOHOL: 2-4 TIMES A MONTH
HOW OFTEN DO YOU HAVE A DRINK CONTAINING ALCOHOL: 3
HOW MANY STANDARD DRINKS CONTAINING ALCOHOL DO YOU HAVE ON A TYPICAL DAY: 1 OR 2
HOW MANY STANDARD DRINKS CONTAINING ALCOHOL DO YOU HAVE ON A TYPICAL DAY: 1

## 2025-04-07 ASSESSMENT — PATIENT HEALTH QUESTIONNAIRE - PHQ9
SUM OF ALL RESPONSES TO PHQ QUESTIONS 1-9: 0
1. LITTLE INTEREST OR PLEASURE IN DOING THINGS: NOT AT ALL
SUM OF ALL RESPONSES TO PHQ QUESTIONS 1-9: 0
2. FEELING DOWN, DEPRESSED OR HOPELESS: NOT AT ALL

## 2025-04-08 ENCOUNTER — OFFICE VISIT (OUTPATIENT)
Facility: CLINIC | Age: 70
End: 2025-04-08
Payer: MEDICARE

## 2025-04-08 VITALS
SYSTOLIC BLOOD PRESSURE: 136 MMHG | DIASTOLIC BLOOD PRESSURE: 89 MMHG | BODY MASS INDEX: 37.93 KG/M2 | TEMPERATURE: 98 F | OXYGEN SATURATION: 94 % | HEART RATE: 73 BPM | WEIGHT: 280 LBS | RESPIRATION RATE: 16 BRPM | HEIGHT: 72 IN

## 2025-04-08 DIAGNOSIS — I10 ESSENTIAL HYPERTENSION: ICD-10-CM

## 2025-04-08 DIAGNOSIS — M25.561 ACUTE PAIN OF RIGHT KNEE: ICD-10-CM

## 2025-04-08 DIAGNOSIS — Z00.00 MEDICARE ANNUAL WELLNESS VISIT, SUBSEQUENT: Primary | ICD-10-CM

## 2025-04-08 DIAGNOSIS — R74.8 ABNORMAL LIVER ENZYMES: ICD-10-CM

## 2025-04-08 DIAGNOSIS — I73.9 PERIPHERAL VASCULAR DISEASE: ICD-10-CM

## 2025-04-08 DIAGNOSIS — J43.9 PULMONARY EMPHYSEMA, UNSPECIFIED EMPHYSEMA TYPE (HCC): ICD-10-CM

## 2025-04-08 DIAGNOSIS — R73.9 ELEVATED BLOOD SUGAR: ICD-10-CM

## 2025-04-08 DIAGNOSIS — M50.30 DDD (DEGENERATIVE DISC DISEASE), CERVICAL: ICD-10-CM

## 2025-04-08 DIAGNOSIS — Z12.5 ENCOUNTER FOR SCREENING FOR MALIGNANT NEOPLASM OF PROSTATE: ICD-10-CM

## 2025-04-08 LAB
ALBUMIN SERPL-MCNC: 4 G/DL (ref 3.5–5)
ALBUMIN/GLOB SERPL: 1 (ref 1.1–2.2)
ALP SERPL-CCNC: 84 U/L (ref 45–117)
ALT SERPL-CCNC: 23 U/L (ref 12–78)
ANION GAP SERPL CALC-SCNC: 3 MMOL/L (ref 2–12)
AST SERPL-CCNC: 21 U/L (ref 15–37)
BASOPHILS # BLD: 0.06 K/UL (ref 0–0.1)
BASOPHILS NFR BLD: 1 % (ref 0–1)
BILIRUB SERPL-MCNC: 0.5 MG/DL (ref 0.2–1)
BUN SERPL-MCNC: 11 MG/DL (ref 6–20)
BUN/CREAT SERPL: 9 (ref 12–20)
CALCIUM SERPL-MCNC: 9 MG/DL (ref 8.5–10.1)
CHLORIDE SERPL-SCNC: 106 MMOL/L (ref 97–108)
CHOLEST SERPL-MCNC: 169 MG/DL
CO2 SERPL-SCNC: 27 MMOL/L (ref 21–32)
CREAT SERPL-MCNC: 1.24 MG/DL (ref 0.7–1.3)
DIFFERENTIAL METHOD BLD: ABNORMAL
EOSINOPHIL # BLD: 0.04 K/UL (ref 0–0.4)
EOSINOPHIL NFR BLD: 0.7 % (ref 0–7)
ERYTHROCYTE [DISTWIDTH] IN BLOOD BY AUTOMATED COUNT: 20.5 % (ref 11.5–14.5)
EST. AVERAGE GLUCOSE BLD GHB EST-MCNC: 120 MG/DL
GLOBULIN SER CALC-MCNC: 4 G/DL (ref 2–4)
GLUCOSE SERPL-MCNC: 105 MG/DL (ref 65–100)
HBA1C MFR BLD: 5.8 % (ref 4–5.6)
HCT VFR BLD AUTO: 42.8 % (ref 36.6–50.3)
HDLC SERPL-MCNC: 43 MG/DL
HDLC SERPL: 3.9 (ref 0–5)
HGB BLD-MCNC: 12.9 G/DL (ref 12.1–17)
IMM GRANULOCYTES # BLD AUTO: 0.02 K/UL (ref 0–0.04)
IMM GRANULOCYTES NFR BLD AUTO: 0.3 % (ref 0–0.5)
LDLC SERPL CALC-MCNC: 107 MG/DL (ref 0–100)
LYMPHOCYTES # BLD: 1.8 K/UL (ref 0.8–3.5)
LYMPHOCYTES NFR BLD: 31.1 % (ref 12–49)
MCH RBC QN AUTO: 24.1 PG (ref 26–34)
MCHC RBC AUTO-ENTMCNC: 30.1 G/DL (ref 30–36.5)
MCV RBC AUTO: 80 FL (ref 80–99)
MONOCYTES # BLD: 0.6 K/UL (ref 0–1)
MONOCYTES NFR BLD: 10.4 % (ref 5–13)
NEUTS SEG # BLD: 3.28 K/UL (ref 1.8–8)
NEUTS SEG NFR BLD: 56.5 % (ref 32–75)
NRBC # BLD: 0 K/UL (ref 0–0.01)
NRBC BLD-RTO: 0 PER 100 WBC
PLATELET # BLD AUTO: 250 K/UL (ref 150–400)
PMV BLD AUTO: 10.2 FL (ref 8.9–12.9)
POTASSIUM SERPL-SCNC: 4.5 MMOL/L (ref 3.5–5.1)
PROT SERPL-MCNC: 8 G/DL (ref 6.4–8.2)
PSA SERPL-MCNC: 0.3 NG/ML (ref 0.01–4)
RBC # BLD AUTO: 5.35 M/UL (ref 4.1–5.7)
RBC MORPH BLD: ABNORMAL
SODIUM SERPL-SCNC: 136 MMOL/L (ref 136–145)
TRIGL SERPL-MCNC: 95 MG/DL
TSH SERPL DL<=0.05 MIU/L-ACNC: 1.71 UIU/ML (ref 0.36–3.74)
VLDLC SERPL CALC-MCNC: 19 MG/DL
WBC # BLD AUTO: 5.8 K/UL (ref 4.1–11.1)

## 2025-04-08 PROCEDURE — 1125F AMNT PAIN NOTED PAIN PRSNT: CPT | Performed by: FAMILY MEDICINE

## 2025-04-08 PROCEDURE — 1123F ACP DISCUSS/DSCN MKR DOCD: CPT | Performed by: FAMILY MEDICINE

## 2025-04-08 PROCEDURE — 1159F MED LIST DOCD IN RCRD: CPT | Performed by: FAMILY MEDICINE

## 2025-04-08 PROCEDURE — 3075F SYST BP GE 130 - 139MM HG: CPT | Performed by: FAMILY MEDICINE

## 2025-04-08 PROCEDURE — 1160F RVW MEDS BY RX/DR IN RCRD: CPT | Performed by: FAMILY MEDICINE

## 2025-04-08 PROCEDURE — G0439 PPPS, SUBSEQ VISIT: HCPCS | Performed by: FAMILY MEDICINE

## 2025-04-08 PROCEDURE — 3079F DIAST BP 80-89 MM HG: CPT | Performed by: FAMILY MEDICINE

## 2025-04-08 PROCEDURE — 99214 OFFICE O/P EST MOD 30 MIN: CPT | Performed by: FAMILY MEDICINE

## 2025-04-08 NOTE — PATIENT INSTRUCTIONS
call for help?   Call 911 if you have symptoms of a heart attack. These may include:    Chest pain or pressure, or a strange feeling in the chest.     Sweating.     Shortness of breath.     Pain, pressure, or a strange feeling in the back, neck, jaw, or upper belly or in one or both shoulders or arms.     Lightheadedness or sudden weakness.     A fast or irregular heartbeat.   After you call 911, the  may tell you to chew 1 adult-strength or 2 to 4 low-dose aspirin. Wait for an ambulance. Do not try to drive yourself.  Watch closely for changes in your health, and be sure to contact your doctor if you have any problems.  Where can you learn more?  Go to https://www.Scyron.net/patientEd and enter F075 to learn more about \"A Healthy Heart: Care Instructions.\"  Current as of: July 31, 2024  Content Version: 14.4  © 7426-6047 [employer].   Care instructions adapted under license by [employer]. If you have questions about a medical condition or this instruction, always ask your healthcare professional. [employer], disclaims any warranty or liability for your use of this information.    Personalized Preventive Plan for Harry Wang - 4/8/2025  Medicare offers a range of preventive health benefits. Some of the tests and screenings are paid in full while other may be subject to a deductible, co-insurance, and/or copay.  Some of these benefits include a comprehensive review of your medical history including lifestyle, illnesses that may run in your family, and various assessments and screenings as appropriate.  After reviewing your medical record and screening and assessments performed today your provider may have ordered immunizations, labs, imaging, and/or referrals for you.  A list of these orders (if applicable) as well as your Preventive Care list are included within your After Visit Summary for your review.

## 2025-04-08 NOTE — PROGRESS NOTES
Chief Complaint   Patient presents with    Medicare AWV     Patient presents in office today for AMW visit and fasting labs.  Has c/o right knee pain for about several months.  Treating with BC powder with some relief.  No other concerns.    Time was used for level of billing: yes, 30 minutes reviewing previous notes, test results and office visit with the patient discussing the diagnosis and importance of compliance with the treatment plan as well as documenting on the day of the visit.    This was additional time spent outside of the routine AWV.  \"Have you been to the ER, urgent care clinic since your last visit?  Hospitalized since your last visit?\"    NO    “Have you seen or consulted any other health care providers outside our system since your last visit?”    NO             Medicare Annual Wellness Visit    Harry Wang is here for Medicare AWV    Assessment & Plan  1. Health maintenance.  - Comprehensive lab workup will be conducted to assess blood sugar levels, liver function, kidney function, cholesterol levels, and prostate health.  - Scheduled for a colonoscopy next month.  - Physical exam reveals regular heart rate, normal thyroid, and slight wheezing in the lungs.  - Counseling provided on the importance of weight loss and increased physical activity.    2. Left knee pain.  - Reports left knee pain persisting for about a month after twisting it while walking.  - No noticeable swelling, but pain persists, affecting ability to walk.  - Physical exam indicates possible swelling and concern for a torn structure.  - Referral to an orthopedic specialist for further evaluation and management.    3. Muscle cramps.  - Experiences cramps in the stomach and thigh, particularly when stretching in certain ways.  - Issue has been intermittent over time.  - Further evaluation will be conducted to determine the underlying cause.  - Counseling provided on potential lifestyle modifications to alleviate

## 2025-04-08 NOTE — PROGRESS NOTES
Chief Complaint   Patient presents with    Medicare AWV     Patient presents in office today for AMW visit and fasting labs.  Has c/o right knee pain for about several months.  Treating with BC powder with some relief.  No other concerns.      \"Have you been to the ER, urgent care clinic since your last visit?  Hospitalized since your last visit?\"    NO    “Have you seen or consulted any other health care providers outside our system since your last visit?”    NO

## 2025-04-09 ENCOUNTER — RESULTS FOLLOW-UP (OUTPATIENT)
Facility: CLINIC | Age: 70
End: 2025-04-09

## 2025-04-11 ENCOUNTER — OFFICE VISIT (OUTPATIENT)
Age: 70
End: 2025-04-11

## 2025-04-11 VITALS
HEIGHT: 72 IN | OXYGEN SATURATION: 98 % | HEART RATE: 72 BPM | DIASTOLIC BLOOD PRESSURE: 88 MMHG | BODY MASS INDEX: 37.93 KG/M2 | WEIGHT: 280 LBS | SYSTOLIC BLOOD PRESSURE: 136 MMHG

## 2025-04-11 DIAGNOSIS — M17.12 PRIMARY OSTEOARTHRITIS OF LEFT KNEE: ICD-10-CM

## 2025-04-11 DIAGNOSIS — M25.562 PAIN IN BOTH KNEES, UNSPECIFIED CHRONICITY: ICD-10-CM

## 2025-04-11 DIAGNOSIS — M25.461 EFFUSION OF RIGHT KNEE JOINT: ICD-10-CM

## 2025-04-11 DIAGNOSIS — M25.561 PAIN IN BOTH KNEES, UNSPECIFIED CHRONICITY: ICD-10-CM

## 2025-04-11 DIAGNOSIS — M17.11 PRIMARY OSTEOARTHRITIS OF RIGHT KNEE: Primary | ICD-10-CM

## 2025-04-11 RX ORDER — DICLOFENAC SODIUM 75 MG/1
75 TABLET, DELAYED RELEASE ORAL 2 TIMES DAILY
Qty: 60 TABLET | Refills: 3 | Status: SHIPPED | OUTPATIENT
Start: 2025-04-11

## 2025-04-11 RX ORDER — TRIAMCINOLONE ACETONIDE 40 MG/ML
40 INJECTION, SUSPENSION INTRA-ARTICULAR; INTRAMUSCULAR ONCE
Status: COMPLETED | OUTPATIENT
Start: 2025-04-11 | End: 2025-04-11

## 2025-04-11 RX ADMIN — TRIAMCINOLONE ACETONIDE 40 MG: 40 INJECTION, SUSPENSION INTRA-ARTICULAR; INTRAMUSCULAR at 11:59

## 2025-04-11 ASSESSMENT — PATIENT HEALTH QUESTIONNAIRE - PHQ9
SUM OF ALL RESPONSES TO PHQ QUESTIONS 1-9: 0
2. FEELING DOWN, DEPRESSED OR HOPELESS: NOT AT ALL
SUM OF ALL RESPONSES TO PHQ QUESTIONS 1-9: 0
1. LITTLE INTEREST OR PLEASURE IN DOING THINGS: NOT AT ALL

## 2025-04-11 NOTE — PROGRESS NOTES
DME Item:   Hinged Knee Brace  [place] KO ELAS W/ CONDYLE PADS & LUCAS          144.00        Qty:1    Dx:M17.11 (ICD-10-CM) - Primary osteoarthritis of right knee  M25.461 (ICD-10-CM)- Effusion of right knee joint    Coverage: Primary Visit Coverage    Payer Plan Sponsor Code Group Number Group Name   UHC MEDICARE UHC AARP MEDICARE ADVANTAGE  [id number]      Primary Visit Coverage Subscriber    ID Name SSN Address   [id number] PEE DAO [id number] [place] [id number]     Effective Date:04.11.2025    Estimated OOP:144.00   
Identified pt with two pt identifiers (name and ). Reviewed chart in preparation for visit and have obtained necessary documentation.    Harry Wang is a 69 y.o. male New Patient (Patient says his right knee is worse than his left. Right knee is swollen and temitope and the left is just painful to walk on. Patients states his knee have been hurting for a couple years now. He saw someone once 20 years ago in [place] but they did not take his insurance. He had injections in the right knee was going to do Gel injections but insurance changed. )  .    Vitals:    25 1103   BP: 136/88   BP Site: Left Upper Arm   Patient Position: Sitting   BP Cuff Size: Large Adult   Pulse: 72   SpO2: 98%   Weight: 127 kg (280 lb)   Height: 1.829 m (6' 0.01\")          1. Have you been to the ER, urgent care clinic since your last visit?  Hospitalized since your last visit?  no     2. Have you seen or consulted any other health care providers outside of the [facility] since your last visit?  Include any pap smears or colon screening.  no  
steroid injection should symptoms worsen in the future.  Otherwise he will follow-up as needed.  All questions were answered.

## 2025-06-10 ENCOUNTER — HOSPITAL ENCOUNTER (OUTPATIENT)
Facility: HOSPITAL | Age: 70
Discharge: HOME | End: 2025-06-13
Payer: MEDICARE

## 2025-06-10 ENCOUNTER — TRANSCRIBE ORDERS (OUTPATIENT)
Facility: HOSPITAL | Age: 70
End: 2025-06-10

## 2025-06-10 ENCOUNTER — OFFICE VISIT (OUTPATIENT)
Facility: CLINIC | Age: 70
End: 2025-06-10
Payer: MEDICARE

## 2025-06-10 VITALS
BODY MASS INDEX: 37.38 KG/M2 | DIASTOLIC BLOOD PRESSURE: 84 MMHG | OXYGEN SATURATION: 99 % | RESPIRATION RATE: 20 BRPM | HEIGHT: 72 IN | HEART RATE: 79 BPM | TEMPERATURE: 99.3 F | WEIGHT: 276 LBS | SYSTOLIC BLOOD PRESSURE: 135 MMHG

## 2025-06-10 DIAGNOSIS — R05.1 ACUTE COUGH: Primary | ICD-10-CM

## 2025-06-10 DIAGNOSIS — R19.5 ABNORMAL STOOL TEST: ICD-10-CM

## 2025-06-10 DIAGNOSIS — R05.1 ACUTE COUGH: ICD-10-CM

## 2025-06-10 PROCEDURE — 1160F RVW MEDS BY RX/DR IN RCRD: CPT | Performed by: PHYSICIAN ASSISTANT

## 2025-06-10 PROCEDURE — 1126F AMNT PAIN NOTED NONE PRSNT: CPT | Performed by: PHYSICIAN ASSISTANT

## 2025-06-10 PROCEDURE — 1123F ACP DISCUSS/DSCN MKR DOCD: CPT | Performed by: PHYSICIAN ASSISTANT

## 2025-06-10 PROCEDURE — 3079F DIAST BP 80-89 MM HG: CPT | Performed by: PHYSICIAN ASSISTANT

## 2025-06-10 PROCEDURE — 99214 OFFICE O/P EST MOD 30 MIN: CPT | Performed by: PHYSICIAN ASSISTANT

## 2025-06-10 PROCEDURE — 3075F SYST BP GE 130 - 139MM HG: CPT | Performed by: PHYSICIAN ASSISTANT

## 2025-06-10 PROCEDURE — 1159F MED LIST DOCD IN RCRD: CPT | Performed by: PHYSICIAN ASSISTANT

## 2025-06-10 PROCEDURE — 71046 X-RAY EXAM CHEST 2 VIEWS: CPT

## 2025-06-10 RX ORDER — AZITHROMYCIN 250 MG/1
TABLET, FILM COATED ORAL
Qty: 6 TABLET | Refills: 0 | Status: SHIPPED | OUTPATIENT
Start: 2025-06-10 | End: 2025-06-20

## 2025-06-10 RX ORDER — GUAIFENESIN/DEXTROMETHORPHAN 100-10MG/5
5 SYRUP ORAL 3 TIMES DAILY PRN
Qty: 120 ML | Refills: 0 | Status: SHIPPED | OUTPATIENT
Start: 2025-06-10 | End: 2025-06-20

## 2025-06-10 RX ORDER — CODEINE PHOSPHATE AND GUAIFENESIN 10; 100 MG/5ML; MG/5ML
5 SOLUTION ORAL
Qty: 25 ML | Refills: 0 | Status: SHIPPED | OUTPATIENT
Start: 2025-06-10 | End: 2025-06-15

## 2025-06-10 NOTE — PROGRESS NOTES
Harry Wang (:  1955) is a 70 y.o. male, here for evaluation of the following chief complaint(s):  Cold Symptoms (Patient c/o head and chest congestion, cough worse at night, x2 weeks. )         Assessment & Plan  1. Cough.  - Symptoms suggest a possible case of sinusitis.  - Increased pain and limited mobility.  - A chest x-ray will be ordered to rule out pneumonia.  - Prescribed guaifenesin and dextromethorphan for daytime use, and a cough syrup containing codeine for nighttime use. An antibiotic regimen of azithromycin will also be initiated, with a dosage of 2 pills on the first day, followed by 1 pill daily for the subsequent 4 days.     2. Constipation.  - Reports constipation with infrequent bowel movements, sometimes up to 2 weeks.  - Abnormal Cologuard test result.  - Advised to maintain adequate hydration and ensure sufficient fiber intake in the diet. If dietary fiber is insufficient, supplementation with Benefiber, Citrucel, or Metamucil is recommended.  - Suggested use of MiraLAX as needed, but not on a regular basis. A referral to a gastroenterologist will be made for further evaluation and management.    Results  Diagnostic Testing   - Cologuard test: Abnormal  1. Acute cough  -     XR CHEST (2 VIEWS); Future  -     guaiFENesin-dextromethorphan (ROBITUSSIN DM) 100-10 MG/5ML syrup; Take 5 mLs by mouth 3 times daily as needed for Cough, Disp-120 mL, R-0Normal  -     guaiFENesin-codeine (GUAIFENESIN AC) 100-10 MG/5ML liquid; Take 5 mLs by mouth nightly for 5 days. Max Daily Amount: 5 mLs, Disp-25 mL, R-0Normal  -     XR CHEST STANDARD (2 VW)  2. Abnormal stool test  -     External Referral To Gastroenterology    No follow-ups on file.       Subjective   History of Present Illness  The patient presents for evaluation of cough and constipation.    He has been experiencing a persistent cough that disrupts his sleep, which started last week. He reports postnasal drip but no ear-related 
  Interpersonal Safety: Not At Risk (4/23/2024)    Interpersonal Safety Domain Source: IP Abuse Screening     Physical abuse: Denies     Verbal abuse: Denies     Emotional abuse: Denies     Financial abuse: Denies     Sexual abuse: Denies   Utilities: Not At Risk (4/7/2025)    [facility] Utilities     Threatened with loss of utilities: No       \"Have you been to the ER, urgent care clinic since your last visit?  Hospitalized since your last visit?\"    NO    “Have you seen or consulted any other health care providers outside of [facility] since your last visit?”    NO            Click Here for Release of Records Request

## 2025-06-11 DIAGNOSIS — I73.9 PERIPHERAL VASCULAR DISEASE: ICD-10-CM

## 2025-06-12 ENCOUNTER — RESULTS FOLLOW-UP (OUTPATIENT)
Facility: CLINIC | Age: 70
End: 2025-06-12

## 2025-06-12 RX ORDER — GABAPENTIN 800 MG/1
TABLET ORAL
Qty: 90 TABLET | Refills: 3 | Status: SHIPPED | OUTPATIENT
Start: 2025-06-12 | End: 2026-06-12

## 2025-06-26 PROBLEM — N52.9 ED (ERECTILE DYSFUNCTION): Status: ACTIVE | Noted: 2025-06-26

## 2025-06-27 ENCOUNTER — OFFICE VISIT (OUTPATIENT)
Age: 70
End: 2025-06-27
Payer: MEDICARE

## 2025-06-27 VITALS
OXYGEN SATURATION: 98 % | SYSTOLIC BLOOD PRESSURE: 121 MMHG | BODY MASS INDEX: 37.11 KG/M2 | HEART RATE: 67 BPM | HEIGHT: 72 IN | DIASTOLIC BLOOD PRESSURE: 82 MMHG | WEIGHT: 274 LBS

## 2025-06-27 DIAGNOSIS — N52.9 ERECTILE DYSFUNCTION, UNSPECIFIED ERECTILE DYSFUNCTION TYPE: Primary | ICD-10-CM

## 2025-06-27 PROCEDURE — 1159F MED LIST DOCD IN RCRD: CPT | Performed by: STUDENT IN AN ORGANIZED HEALTH CARE EDUCATION/TRAINING PROGRAM

## 2025-06-27 PROCEDURE — 99204 OFFICE O/P NEW MOD 45 MIN: CPT | Performed by: STUDENT IN AN ORGANIZED HEALTH CARE EDUCATION/TRAINING PROGRAM

## 2025-06-27 PROCEDURE — 3079F DIAST BP 80-89 MM HG: CPT | Performed by: STUDENT IN AN ORGANIZED HEALTH CARE EDUCATION/TRAINING PROGRAM

## 2025-06-27 PROCEDURE — 1126F AMNT PAIN NOTED NONE PRSNT: CPT | Performed by: STUDENT IN AN ORGANIZED HEALTH CARE EDUCATION/TRAINING PROGRAM

## 2025-06-27 PROCEDURE — 3074F SYST BP LT 130 MM HG: CPT | Performed by: STUDENT IN AN ORGANIZED HEALTH CARE EDUCATION/TRAINING PROGRAM

## 2025-06-27 PROCEDURE — 1123F ACP DISCUSS/DSCN MKR DOCD: CPT | Performed by: STUDENT IN AN ORGANIZED HEALTH CARE EDUCATION/TRAINING PROGRAM

## 2025-06-27 RX ORDER — TADALAFIL 20 MG/1
20 TABLET ORAL PRN
Qty: 10 TABLET | Refills: 2 | Status: SHIPPED | OUTPATIENT
Start: 2025-06-27

## 2025-06-27 NOTE — PROGRESS NOTES
HISTORY OF PRESENT ILLNESS    History of Present Illness  The patient is a 70-year-old male with erectile dysfunction for several years.    He achieves weak erections that do not persist during sexual activity. He is interested in trying Cialis after ineffective use of generic Viagra.    PAST MEDICAL HISTORY:  PMHx (including negatives):  has a past medical history of Chronic obstructive pulmonary disease (HCC), GERD (gastroesophageal reflux disease), History of vascular access device, Hypertension, DOMI (obstructive sleep apnea), and Skin cancer.   PSurgHx:  has a past surgical history that includes Urological Surgery.  PSocHx:  reports that he has been smoking cigarettes. He started smoking about 50 years ago. He has a 12.6 pack-year smoking history. He has never used smokeless tobacco. He reports current alcohol use of about 6.0 standard drinks of alcohol per week. He reports that he does not currently use drugs after having used the following drugs: Cocaine.     Physical Exam  General: No acute distress  CV: Well perfused, regular rate  Lungs: Normal work of breathing  Abdomen: Nondistended, nontender      Results      I personally reviewed and interpreted all available imaging and results with the patient today.     Assessment & Plan  1. Erectile dysfunction: Chronic - not at goal  - Discussed contributing factors: age, smoking, hypertension, diabetes.  - Reviewed treatment options: oral medications (Viagra, Cialis), penile constriction bands, vacuum erection devices, intracavernosal injections, penile pump implantation.  - Prescribed Cialis, to be taken morning or night, lasts 24 hours.  - Prescription sent to [facility] pharmacy.  - Advised to contact clinic for other treatment options.    Follow-up  - Contact clinic if interested in other treatment options.    Ramu Murdock MD    Please note that portions of this note were completed with Dragon dictation and [employer] software.  Quite often

## 2025-06-27 NOTE — PROGRESS NOTES
Chief Complaint   Patient presents with    New Patient    Erectile Dysfunction     1. Have you been to the ER, urgent care clinic since your last visit?  Hospitalized since your last visit?No    2. Have you seen or consulted any other health care providers outside of the [facility] since your last visit?  Include any pap smears or colon screening. No  /82   Pulse 67   Ht 1.829 m (6')   Wt 124.3 kg (274 lb)   SpO2 98%   BMI 37.16 kg/m²

## 2025-07-01 DIAGNOSIS — N52.9 ERECTILE DYSFUNCTION, UNSPECIFIED ERECTILE DYSFUNCTION TYPE: ICD-10-CM

## 2025-07-01 RX ORDER — TADALAFIL 20 MG/1
20 TABLET ORAL PRN
Qty: 10 TABLET | Refills: 2 | Status: SHIPPED | OUTPATIENT
Start: 2025-07-01

## (undated) DEVICE — BANDAGE COMPR W3INXL5YD TAN POLY COHESIVE CARING SGL

## (undated) DEVICE — TOWEL,OR,DSP,ST,BLUE,STD,2/PK,40PK/CS: Brand: MEDLINE

## (undated) DEVICE — GLOVE SURG SZ 75 L12IN FNGR THK79MIL GRN LTX FREE

## (undated) DEVICE — SOL IRRIGATION INJ NACL 0.9% 500ML BTL

## (undated) DEVICE — HAND-SFMCASU: Brand: MEDLINE INDUSTRIES, INC.

## (undated) DEVICE — REM POLYHESIVE ADULT PATIENT RETURN ELECTRODE: Brand: VALLEYLAB

## (undated) DEVICE — GLOVE ORANGE PI 7   MSG9070

## (undated) DEVICE — SUTURE PDS II SZ 2-0 L27IN ABSRB VLT SH L26MM 1/2 CIR Z317H

## (undated) DEVICE — ROCKER SWITCH PENCIL HOLSTER: Brand: VALLEYLAB

## (undated) DEVICE — CANISTER WND VAC ASST CLSR --

## (undated) DEVICE — 3M™ STERI-DRAPE™ U-DRAPE 1015: Brand: STERI-DRAPE™

## (undated) DEVICE — ZIMMER® STERILE DISPOSABLE TOURNIQUET CUFF WITH PROTECTIVE SLEEVE AND PLC, DUAL PORT, SINGLE BLADDER, 18 IN. (46 CM)

## (undated) DEVICE — DRESSING GZ FLUF 36X36 IN RND 2 PLY PD GZ AMER WHT CROSS

## (undated) DEVICE — ELECTRODE NDL 2.8IN COAT VALLEYLAB

## (undated) DEVICE — MASTISOL ADHESIVE LIQ 2/3ML

## (undated) DEVICE — TUBING, SUCTION, 1/4" X 12', STRAIGHT: Brand: MEDLINE

## (undated) DEVICE — GLOVE SURG SZ 85 L12IN FNGR THK79MIL GRN LTX FREE